# Patient Record
Sex: MALE | Race: WHITE | NOT HISPANIC OR LATINO | Employment: OTHER | URBAN - METROPOLITAN AREA
[De-identification: names, ages, dates, MRNs, and addresses within clinical notes are randomized per-mention and may not be internally consistent; named-entity substitution may affect disease eponyms.]

---

## 2017-09-20 PROBLEM — S52.509A DISTAL RADIUS FRACTURE: Status: ACTIVE | Noted: 2017-09-20

## 2023-10-14 ENCOUNTER — HOSPITAL ENCOUNTER (INPATIENT)
Facility: HOSPITAL | Age: 73
LOS: 6 days | Discharge: NURSING, REHAB OR LONG-TERM CARE | DRG: 064 | End: 2023-10-20
Attending: INTERNAL MEDICINE | Admitting: INTERNAL MEDICINE
Payer: MEDICARE

## 2023-10-14 DIAGNOSIS — R94.31 QT PROLONGATION: ICD-10-CM

## 2023-10-14 DIAGNOSIS — I61.9 INTRACEREBRAL HEMORRHAGE: ICD-10-CM

## 2023-10-14 DIAGNOSIS — I61.9 ICH (INTRACEREBRAL HEMORRHAGE): ICD-10-CM

## 2023-10-14 PROBLEM — F01.50 VASCULAR DEMENTIA: Chronic | Status: ACTIVE | Noted: 2023-10-14

## 2023-10-14 PROBLEM — G93.6 VASOGENIC BRAIN EDEMA: Status: ACTIVE | Noted: 2023-10-14

## 2023-10-14 PROBLEM — R90.89 MIDLINE SHIFT OF BRAIN: Status: ACTIVE | Noted: 2023-10-14

## 2023-10-14 PROBLEM — I61.1 NONTRAUMATIC CORTICAL HEMORRHAGE OF LEFT CEREBRAL HEMISPHERE: Status: ACTIVE | Noted: 2023-10-14

## 2023-10-14 PROBLEM — Z97.8 ENDOTRACHEALLY INTUBATED: Status: ACTIVE | Noted: 2023-10-14

## 2023-10-14 PROBLEM — S52.509A DISTAL RADIUS FRACTURE: Status: RESOLVED | Noted: 2017-09-20 | Resolved: 2023-10-14

## 2023-10-14 LAB
ABO + RH BLD: NORMAL
ALBUMIN SERPL BCP-MCNC: 3.4 G/DL (ref 3.5–5.2)
ALLENS TEST: ABNORMAL
ALP SERPL-CCNC: 68 U/L (ref 55–135)
ALT SERPL W/O P-5'-P-CCNC: 10 U/L (ref 10–44)
ANION GAP SERPL CALC-SCNC: 11 MMOL/L (ref 8–16)
APTT PPP: 22.8 SEC (ref 21–32)
ASCENDING AORTA: 3.8 CM
AST SERPL-CCNC: 28 U/L (ref 10–40)
AV INDEX (PROSTH): 0.7
AV MEAN GRADIENT: 4 MMHG
AV PEAK GRADIENT: 8 MMHG
AV VALVE AREA BY VELOCITY RATIO: 3.42 CM²
AV VALVE AREA: 2.93 CM²
AV VELOCITY RATIO: 0.82
BACTERIA #/AREA URNS AUTO: ABNORMAL /HPF
BASOPHILS # BLD AUTO: 0.03 K/UL (ref 0–0.2)
BASOPHILS NFR BLD: 0.2 % (ref 0–1.9)
BILIRUB SERPL-MCNC: 1 MG/DL (ref 0.1–1)
BILIRUB UR QL STRIP: NEGATIVE
BLD GP AB SCN CELLS X3 SERPL QL: NORMAL
BSA FOR ECHO PROCEDURE: 1.71 M2
BUN SERPL-MCNC: 19 MG/DL (ref 8–23)
CALCIUM SERPL-MCNC: 8.5 MG/DL (ref 8.7–10.5)
CHLORIDE SERPL-SCNC: 108 MMOL/L (ref 95–110)
CHOLEST SERPL-MCNC: 147 MG/DL (ref 120–199)
CHOLEST/HDLC SERPL: 3.9 {RATIO} (ref 2–5)
CLARITY UR REFRACT.AUTO: CLEAR
CO2 SERPL-SCNC: 21 MMOL/L (ref 23–29)
COLOR UR AUTO: YELLOW
CREAT SERPL-MCNC: 0.9 MG/DL (ref 0.5–1.4)
CV ECHO LV RWT: 0.31 CM
DIFFERENTIAL METHOD: ABNORMAL
DOP CALC AO PEAK VEL: 1.37 M/S
DOP CALC AO VTI: 28.13 CM
DOP CALC LVOT AREA: 4.2 CM2
DOP CALC LVOT DIAMETER: 2.31 CM
DOP CALC LVOT PEAK VEL: 1.12 M/S
DOP CALC LVOT STROKE VOLUME: 82.31 CM3
DOP CALCLVOT PEAK VEL VTI: 19.65 CM
E WAVE DECELERATION TIME: 196.39 MSEC
E/A RATIO: 0.74
E/E' RATIO: 8.71 M/S
ECHO LV POSTERIOR WALL: 0.77 CM (ref 0.6–1.1)
EJECTION FRACTION: 65 %
EOSINOPHIL # BLD AUTO: 0 K/UL (ref 0–0.5)
EOSINOPHIL NFR BLD: 0.1 % (ref 0–8)
ERYTHROCYTE [DISTWIDTH] IN BLOOD BY AUTOMATED COUNT: 12.3 % (ref 11.5–14.5)
EST. GFR  (NO RACE VARIABLE): >60 ML/MIN/1.73 M^2
ESTIMATED AVG GLUCOSE: 117 MG/DL (ref 68–131)
FRACTIONAL SHORTENING: 37 % (ref 28–44)
GLUCOSE SERPL-MCNC: 129 MG/DL (ref 70–110)
GLUCOSE UR QL STRIP: NEGATIVE
HBA1C MFR BLD: 5.7 % (ref 4–5.6)
HCO3 UR-SCNC: 26.7 MMOL/L (ref 24–28)
HCT VFR BLD AUTO: 40.7 % (ref 40–54)
HDLC SERPL-MCNC: 38 MG/DL (ref 40–75)
HDLC SERPL: 25.9 % (ref 20–50)
HGB BLD-MCNC: 13.8 G/DL (ref 14–18)
HGB UR QL STRIP: ABNORMAL
HYALINE CASTS UR QL AUTO: 0 /LPF
IMM GRANULOCYTES # BLD AUTO: 0.07 K/UL (ref 0–0.04)
IMM GRANULOCYTES NFR BLD AUTO: 0.5 % (ref 0–0.5)
INR PPP: 1 (ref 0.8–1.2)
INTERVENTRICULAR SEPTUM: 0.77 CM (ref 0.6–1.1)
IVRT: 117.03 MSEC
KETONES UR QL STRIP: ABNORMAL
LA MAJOR: 5.33 CM
LA MINOR: 5.29 CM
LA WIDTH: 4.55 CM
LDLC SERPL CALC-MCNC: 87.8 MG/DL (ref 63–159)
LEFT ATRIUM SIZE: 3.57 CM
LEFT ATRIUM VOLUME INDEX MOD: 38.6 ML/M2
LEFT ATRIUM VOLUME INDEX: 42.9 ML/M2
LEFT ATRIUM VOLUME MOD: 65.93 CM3
LEFT ATRIUM VOLUME: 73.31 CM3
LEFT INTERNAL DIMENSION IN SYSTOLE: 3.2 CM (ref 2.1–4)
LEFT VENTRICLE DIASTOLIC VOLUME INDEX: 70.55 ML/M2
LEFT VENTRICLE DIASTOLIC VOLUME: 120.64 ML
LEFT VENTRICLE MASS INDEX: 77 G/M2
LEFT VENTRICLE SYSTOLIC VOLUME INDEX: 24 ML/M2
LEFT VENTRICLE SYSTOLIC VOLUME: 41.09 ML
LEFT VENTRICULAR INTERNAL DIMENSION IN DIASTOLE: 5.04 CM (ref 3.5–6)
LEFT VENTRICULAR MASS: 131.11 G
LEUKOCYTE ESTERASE UR QL STRIP: NEGATIVE
LV LATERAL E/E' RATIO: 8.71 M/S
LV SEPTAL E/E' RATIO: 8.71 M/S
LYMPHOCYTES # BLD AUTO: 1.2 K/UL (ref 1–4.8)
LYMPHOCYTES NFR BLD: 9 % (ref 18–48)
MAGNESIUM SERPL-MCNC: 1.9 MG/DL (ref 1.6–2.6)
MCH RBC QN AUTO: 31.8 PG (ref 27–31)
MCHC RBC AUTO-ENTMCNC: 33.9 G/DL (ref 32–36)
MCV RBC AUTO: 94 FL (ref 82–98)
MICROSCOPIC COMMENT: ABNORMAL
MONOCYTES # BLD AUTO: 0.7 K/UL (ref 0.3–1)
MONOCYTES NFR BLD: 5.1 % (ref 4–15)
MV PEAK A VEL: 0.82 M/S
MV PEAK E VEL: 0.61 M/S
MV STENOSIS PRESSURE HALF TIME: 56.95 MS
MV VALVE AREA P 1/2 METHOD: 3.86 CM2
NEUTROPHILS # BLD AUTO: 11.3 K/UL (ref 1.8–7.7)
NEUTROPHILS NFR BLD: 85.1 % (ref 38–73)
NITRITE UR QL STRIP: NEGATIVE
NONHDLC SERPL-MCNC: 109 MG/DL
NRBC BLD-RTO: 0 /100 WBC
PCO2 BLDA: 48.5 MMHG (ref 35–45)
PH SMN: 7.35 [PH] (ref 7.35–7.45)
PH UR STRIP: 7 [PH] (ref 5–8)
PHOSPHATE SERPL-MCNC: 4.3 MG/DL (ref 2.7–4.5)
PISA TR MAX VEL: 2.49 M/S
PLATELET # BLD AUTO: 266 K/UL (ref 150–450)
PMV BLD AUTO: 9.5 FL (ref 9.2–12.9)
PO2 BLDA: 132 MMHG (ref 80–100)
POC BE: 1 MMOL/L
POC SATURATED O2: 99 % (ref 95–100)
POC TCO2: 28 MMOL/L (ref 23–27)
POCT GLUCOSE: 112 MG/DL (ref 70–110)
POCT GLUCOSE: 146 MG/DL (ref 70–110)
POTASSIUM SERPL-SCNC: 4.8 MMOL/L (ref 3.5–5.1)
PROT SERPL-MCNC: 6.6 G/DL (ref 6–8.4)
PROT UR QL STRIP: ABNORMAL
PROTHROMBIN TIME: 10.7 SEC (ref 9–12.5)
RA MAJOR: 4.63 CM
RA PRESSURE ESTIMATED: 3 MMHG
RA WIDTH: 3.77 CM
RBC # BLD AUTO: 4.34 M/UL (ref 4.6–6.2)
RBC #/AREA URNS AUTO: 39 /HPF (ref 0–4)
RIGHT VENTRICULAR END-DIASTOLIC DIMENSION: 3.15 CM
RV TB RVSP: 5 MMHG
SAMPLE: ABNORMAL
SINUS: 4.44 CM
SITE: ABNORMAL
SODIUM SERPL-SCNC: 140 MMOL/L (ref 136–145)
SP GR UR STRIP: >1.03 (ref 1–1.03)
SPECIMEN OUTDATE: NORMAL
STJ: 3.37 CM
TDI LATERAL: 0.07 M/S
TDI SEPTAL: 0.07 M/S
TDI: 0.07 M/S
TR MAX PG: 25 MMHG
TRICUSPID ANNULAR PLANE SYSTOLIC EXCURSION: 2.56 CM
TRIGL SERPL-MCNC: 106 MG/DL (ref 30–150)
TSH SERPL DL<=0.005 MIU/L-ACNC: 2.78 UIU/ML (ref 0.4–4)
TV REST PULMONARY ARTERY PRESSURE: 28 MMHG
URN SPEC COLLECT METH UR: ABNORMAL
WBC # BLD AUTO: 13.28 K/UL (ref 3.9–12.7)
WBC #/AREA URNS AUTO: 15 /HPF (ref 0–5)
Z-SCORE OF LEFT VENTRICULAR DIMENSION IN END DIASTOLE: 0.6
Z-SCORE OF LEFT VENTRICULAR DIMENSION IN END SYSTOLE: 0.67

## 2023-10-14 PROCEDURE — 99291 CRITICAL CARE FIRST HOUR: CPT | Mod: ,,, | Performed by: REGISTERED NURSE

## 2023-10-14 PROCEDURE — 83735 ASSAY OF MAGNESIUM: CPT | Performed by: REGISTERED NURSE

## 2023-10-14 PROCEDURE — 36600 WITHDRAWAL OF ARTERIAL BLOOD: CPT

## 2023-10-14 PROCEDURE — 84443 ASSAY THYROID STIM HORMONE: CPT | Performed by: REGISTERED NURSE

## 2023-10-14 PROCEDURE — 86850 RBC ANTIBODY SCREEN: CPT | Performed by: REGISTERED NURSE

## 2023-10-14 PROCEDURE — 80061 LIPID PANEL: CPT | Performed by: REGISTERED NURSE

## 2023-10-14 PROCEDURE — 27100171 HC OXYGEN HIGH FLOW UP TO 24 HOURS

## 2023-10-14 PROCEDURE — 81001 URINALYSIS AUTO W/SCOPE: CPT | Performed by: REGISTERED NURSE

## 2023-10-14 PROCEDURE — 63600175 PHARM REV CODE 636 W HCPCS: Performed by: REGISTERED NURSE

## 2023-10-14 PROCEDURE — 85730 THROMBOPLASTIN TIME PARTIAL: CPT | Performed by: REGISTERED NURSE

## 2023-10-14 PROCEDURE — 85610 PROTHROMBIN TIME: CPT | Performed by: REGISTERED NURSE

## 2023-10-14 PROCEDURE — 84100 ASSAY OF PHOSPHORUS: CPT | Performed by: REGISTERED NURSE

## 2023-10-14 PROCEDURE — 83036 HEMOGLOBIN GLYCOSYLATED A1C: CPT | Performed by: REGISTERED NURSE

## 2023-10-14 PROCEDURE — 99900035 HC TECH TIME PER 15 MIN (STAT)

## 2023-10-14 PROCEDURE — A9585 GADOBUTROL INJECTION: HCPCS | Performed by: INTERNAL MEDICINE

## 2023-10-14 PROCEDURE — 27200966 HC CLOSED SUCTION SYSTEM

## 2023-10-14 PROCEDURE — 25500020 PHARM REV CODE 255: Performed by: INTERNAL MEDICINE

## 2023-10-14 PROCEDURE — 99223 1ST HOSP IP/OBS HIGH 75: CPT | Mod: ,,, | Performed by: PSYCHIATRY & NEUROLOGY

## 2023-10-14 PROCEDURE — 94002 VENT MGMT INPAT INIT DAY: CPT

## 2023-10-14 PROCEDURE — 82803 BLOOD GASES ANY COMBINATION: CPT

## 2023-10-14 PROCEDURE — 94761 N-INVAS EAR/PLS OXIMETRY MLT: CPT

## 2023-10-14 PROCEDURE — 51798 US URINE CAPACITY MEASURE: CPT

## 2023-10-14 PROCEDURE — 93005 ELECTROCARDIOGRAM TRACING: CPT

## 2023-10-14 PROCEDURE — 20000000 HC ICU ROOM

## 2023-10-14 PROCEDURE — 93010 EKG 12-LEAD: ICD-10-PCS | Mod: ,,, | Performed by: INTERNAL MEDICINE

## 2023-10-14 PROCEDURE — 87086 URINE CULTURE/COLONY COUNT: CPT | Performed by: REGISTERED NURSE

## 2023-10-14 PROCEDURE — 99291 PR CRITICAL CARE, E/M 30-74 MINUTES: ICD-10-PCS | Mod: ,,, | Performed by: REGISTERED NURSE

## 2023-10-14 PROCEDURE — 51701 INSERT BLADDER CATHETER: CPT

## 2023-10-14 PROCEDURE — 80053 COMPREHEN METABOLIC PANEL: CPT | Performed by: REGISTERED NURSE

## 2023-10-14 PROCEDURE — 85025 COMPLETE CBC W/AUTO DIFF WBC: CPT | Performed by: REGISTERED NURSE

## 2023-10-14 PROCEDURE — 93010 ELECTROCARDIOGRAM REPORT: CPT | Mod: ,,, | Performed by: INTERNAL MEDICINE

## 2023-10-14 PROCEDURE — 25000003 PHARM REV CODE 250: Performed by: REGISTERED NURSE

## 2023-10-14 PROCEDURE — 99223 PR INITIAL HOSPITAL CARE,LEVL III: ICD-10-PCS | Mod: ,,, | Performed by: PSYCHIATRY & NEUROLOGY

## 2023-10-14 RX ORDER — CHLORHEXIDINE GLUCONATE ORAL RINSE 1.2 MG/ML
15 SOLUTION DENTAL 2 TIMES DAILY
Status: CANCELLED | OUTPATIENT
Start: 2023-10-14

## 2023-10-14 RX ORDER — SODIUM CHLORIDE 0.9 % (FLUSH) 0.9 %
10 SYRINGE (ML) INJECTION
Status: DISCONTINUED | OUTPATIENT
Start: 2023-10-14 | End: 2023-10-16

## 2023-10-14 RX ORDER — NICARDIPINE HYDROCHLORIDE 0.2 MG/ML
INJECTION INTRAVENOUS
Status: DISPENSED
Start: 2023-10-14 | End: 2023-10-14

## 2023-10-14 RX ORDER — LABETALOL HCL 20 MG/4 ML
10 SYRINGE (ML) INTRAVENOUS EVERY 4 HOURS PRN
Status: DISCONTINUED | OUTPATIENT
Start: 2023-10-14 | End: 2023-10-14

## 2023-10-14 RX ORDER — PROPOFOL 10 MG/ML
0-50 INJECTION, EMULSION INTRAVENOUS CONTINUOUS
Status: DISCONTINUED | OUTPATIENT
Start: 2023-10-14 | End: 2023-10-15

## 2023-10-14 RX ORDER — GLUCAGON 1 MG
1 KIT INJECTION
Status: DISCONTINUED | OUTPATIENT
Start: 2023-10-14 | End: 2023-10-16

## 2023-10-14 RX ORDER — FAMOTIDINE 20 MG/1
20 TABLET, FILM COATED ORAL 2 TIMES DAILY
Status: DISCONTINUED | OUTPATIENT
Start: 2023-10-14 | End: 2023-10-15

## 2023-10-14 RX ORDER — ACETAMINOPHEN 325 MG/1
650 TABLET ORAL EVERY 6 HOURS PRN
Status: DISCONTINUED | OUTPATIENT
Start: 2023-10-14 | End: 2023-10-16

## 2023-10-14 RX ORDER — MEMANTINE HYDROCHLORIDE 5 MG/1
5 TABLET ORAL 2 TIMES DAILY
Status: DISCONTINUED | OUTPATIENT
Start: 2023-10-14 | End: 2023-10-20 | Stop reason: HOSPADM

## 2023-10-14 RX ORDER — NICARDIPINE HYDROCHLORIDE 0.2 MG/ML
0-15 INJECTION INTRAVENOUS CONTINUOUS
Status: DISCONTINUED | OUTPATIENT
Start: 2023-10-14 | End: 2023-10-16

## 2023-10-14 RX ORDER — ONDANSETRON 2 MG/ML
4 INJECTION INTRAMUSCULAR; INTRAVENOUS EVERY 8 HOURS PRN
Status: DISCONTINUED | OUTPATIENT
Start: 2023-10-14 | End: 2023-10-20 | Stop reason: HOSPADM

## 2023-10-14 RX ORDER — AMOXICILLIN 250 MG
1 CAPSULE ORAL 2 TIMES DAILY
Status: DISCONTINUED | OUTPATIENT
Start: 2023-10-14 | End: 2023-10-16

## 2023-10-14 RX ORDER — HYDRALAZINE HYDROCHLORIDE 20 MG/ML
10 INJECTION INTRAMUSCULAR; INTRAVENOUS EVERY 4 HOURS PRN
Status: DISCONTINUED | OUTPATIENT
Start: 2023-10-14 | End: 2023-10-16

## 2023-10-14 RX ORDER — HYDRALAZINE HYDROCHLORIDE 20 MG/ML
10 INJECTION INTRAMUSCULAR; INTRAVENOUS EVERY 4 HOURS PRN
Status: DISCONTINUED | OUTPATIENT
Start: 2023-10-14 | End: 2023-10-14

## 2023-10-14 RX ORDER — LABETALOL HCL 20 MG/4 ML
10 SYRINGE (ML) INTRAVENOUS EVERY 4 HOURS PRN
Status: DISCONTINUED | OUTPATIENT
Start: 2023-10-14 | End: 2023-10-16

## 2023-10-14 RX ORDER — INSULIN ASPART 100 [IU]/ML
0-5 INJECTION, SOLUTION INTRAVENOUS; SUBCUTANEOUS EVERY 6 HOURS PRN
Status: DISCONTINUED | OUTPATIENT
Start: 2023-10-14 | End: 2023-10-16

## 2023-10-14 RX ORDER — GADOBUTROL 604.72 MG/ML
7 INJECTION INTRAVENOUS
Status: COMPLETED | OUTPATIENT
Start: 2023-10-14 | End: 2023-10-14

## 2023-10-14 RX ADMIN — FAMOTIDINE 20 MG: 20 TABLET ORAL at 08:10

## 2023-10-14 RX ADMIN — LABETALOL HYDROCHLORIDE 10 MG: 5 INJECTION, SOLUTION INTRAVENOUS at 11:10

## 2023-10-14 RX ADMIN — SENNOSIDES AND DOCUSATE SODIUM 1 TABLET: 50; 8.6 TABLET ORAL at 08:10

## 2023-10-14 RX ADMIN — MEMANTINE HYDROCHLORIDE 5 MG: 5 TABLET ORAL at 09:10

## 2023-10-14 RX ADMIN — FAMOTIDINE 20 MG: 20 TABLET ORAL at 09:10

## 2023-10-14 RX ADMIN — THERA TABS 1 TABLET: TAB at 09:10

## 2023-10-14 RX ADMIN — PROPOFOL 50 MCG/KG/MIN: 10 INJECTION, EMULSION INTRAVENOUS at 09:10

## 2023-10-14 RX ADMIN — ACETAMINOPHEN 650 MG: 325 TABLET ORAL at 09:10

## 2023-10-14 RX ADMIN — GADOBUTROL 7 ML: 604.72 INJECTION INTRAVENOUS at 08:10

## 2023-10-14 RX ADMIN — SENNOSIDES AND DOCUSATE SODIUM 1 TABLET: 50; 8.6 TABLET ORAL at 09:10

## 2023-10-14 RX ADMIN — ONDANSETRON 4 MG: 2 INJECTION INTRAMUSCULAR; INTRAVENOUS at 11:10

## 2023-10-14 RX ADMIN — IOHEXOL 75 ML: 350 INJECTION, SOLUTION INTRAVENOUS at 03:10

## 2023-10-14 RX ADMIN — HYDRALAZINE HYDROCHLORIDE 10 MG: 20 INJECTION, SOLUTION INTRAMUSCULAR; INTRAVENOUS at 10:10

## 2023-10-14 RX ADMIN — MEMANTINE HYDROCHLORIDE 5 MG: 5 TABLET ORAL at 08:10

## 2023-10-14 RX ADMIN — ACETAMINOPHEN 650 MG: 325 TABLET ORAL at 04:10

## 2023-10-14 RX ADMIN — PROPOFOL 50 MCG/KG/MIN: 10 INJECTION, EMULSION INTRAVENOUS at 05:10

## 2023-10-14 NOTE — ASSESSMENT & PLAN NOTE
74 y/o M with PMH of vascular dementia presents with L ICH with IVH from OSH. Denies home anticoagulation use and recent falls/trauma. No seizure activity noted. Repeat CTA with no interval changes, no active bleeding and no evident vessel abnormalities.     E2 V1T M5  PERRL  ICH 2    -Admit to NCC  -VS/neuro checks q1h  -NSGY consulted  -Vascular neurology consulted  -SBP goal < 140  -PRN labetalol/hydralazine/cardene  -Na+ > 135  -Seizure precautions  -Accuchecks q6h w/ low dose SSI for glycemic control  -MRI brain w/ and w/o contrast    Antithrombotics for secondary stroke prevention: Antiplatelets: None: Intracerebral Hemorrhage    Statins for secondary stroke prevention and hyperlipidemia, if present:   Statins: None: Reason: ICH    Aggressive risk factor modification: None     Rehab efforts: The patient has been evaluated by a stroke team provider and the therapy needs have been fully considered based off the presenting complaints and exam findings. The following therapy evaluations are needed: PT evaluate and treat, OT evaluate and treat, SLP evaluate and treat, PM&R evaluate for appropriate placement    Diagnostics ordered/pending: CTA Head to assess vasculature , HgbA1C to assess blood glucose levels, Lipid Profile to assess cholesterol levels, MRI head without contrast to assess brain parenchyma, TTE to assess cardiac function/status , TSH to assess thyroid function    VTE prophylaxis: None: Reason for No Pharmacological VTE Prophylaxis: History of systemic or intracranial bleeding    BP parameters: ICH: SBP <140

## 2023-10-14 NOTE — PROGRESS NOTES
[facility]  Neurosurgery  Progress Note    Subjective:     History of Present Illness: 73M h/o vascular dementia presents as transfer from [facility] with a chief complaint of confusion and difficulty seeing, and HA. The patient's family member stated that he was in his usual state of health this morning he left the house around 2:00 p.m. on 10-13-23 to cut some grass. When he returned around 5:00pm the patient's wife noted that he was having difficulty turning off his truck and he was having difficulty seeing. He also reported generalized weakness and a headache on the left side it has been off and on for about a week.  Denies trauma, fever/chills, hearing changes, dysphagia, dysarthria, nausea, new-onset weakness or sensory change, bowel/bladder changes. Patient was intubated prior to transfer as decrease in GCS and patient vomited. He was placed on Cardene drip and sedation.    CTA on presentation demonstrates a grossly stable 4.5x4.3x3.0cm L parieto-occipital ICH with IVH. There are various densities of blood within the hematoma cavity as well as perihematomal edema. There is no sign of active extravasation. There are no obvious vessel abnormalities. There is approximately 3cm MLS.       Post-Op Info:  * No surgery found *         Interval History: 10/14: NAEON, AFVSS, Exam stable (very agitated off of sedation). MRI w/ significant amyloid as likely underlying disease process.    Medications:  Continuous Infusions:   niCARdipine      propofoL 50 mcg/kg/min (10/14/23 0910)     Scheduled Meds:   famotidine  20 mg Per OG tube BID    memantine  5 mg Oral BID    multivitamin  1 tablet Per OG tube Daily    senna-docusate 8.6-50 mg  1 tablet Per OG tube BID     PRN Meds:acetaminophen, dextrose 10%, dextrose 10%, glucagon (human recombinant), hydrALAZINE, insulin aspart U-100, labetalol, ondansetron, sodium chloride 0.9%     Review of Systems  Objective:     Weight: 64 kg  (141 lb)  Body mass index is 23.46 kg/m².  Vital Signs (Most Recent):  Temp: 98 °F (36.7 °C) (10/14/23 1101)  Pulse: (!) 59 (10/14/23 1201)  Resp: 18 (10/14/23 1201)  BP: (!) 142/101 (10/14/23 1201)  SpO2: 99 % (10/14/23 1201) Vital Signs (24h Range):  Temp:  [97 °F (36.1 °C)-98.2 °F (36.8 °C)] 98 °F (36.7 °C)  Pulse:  [58-95] 59  Resp:  [12-39] 18  SpO2:  [94 %-100 %] 99 %  BP: (111-244)/() 142/101          Vent Mode: A/C  Oxygen Concentration (%):  [] 40  Resp Rate Total:  [12 br/min-28 br/min] 19 br/min  Vt Set:  [450 mL-500 mL] 450 mL  PEEP/CPAP:  [5 cmH20] 5 cmH20  Mean Airway Pressure:  [6.8 cmH20-8.9 cmH20] 6.9 cmH20             NG/OG Tube 10/13/23 Center mouth (Active)   Placement Check placement verified by x-ray;placement verified by distal tube length measurement 10/14/23 0505   Tolerance no signs/symptoms of discomfort 10/14/23 1101   Securement secured to commercial device 10/14/23 1101   Clamp Status/Tolerance clamped;no restlessness;no nausea;no emesis;no abdominal distention;no abdominal discomfort;no residual 10/14/23 1101   Suction Setting/Drainage Method suction at the bedside 10/14/23 1101   Insertion Site Appearance no redness, warmth, tenderness, skin breakdown, drainage 10/14/23 1101   Drainage None 10/14/23 0505   Current Rate (mL/hr) 0 mL/hr 10/14/23 0148          Physical Exam   Neurosurgery Physical Exam  E2VtM5, sedated on propofol (agitated when off sedation);  +C/G/C, PERRL, No gaze preference;  Artie and purposefully when off sedation      Significant Labs:  Recent Labs   Lab 10/13/23  2210 10/14/23  0404   * 129*    140   K 4.1 4.8    108   CO2 24 21*   BUN 21* 19   CREATININE 0.91 0.9   CALCIUM 10.2 8.5*   MG  --  1.9     Recent Labs   Lab 10/13/23  2210 10/14/23  0404   WBC 17.10* 13.28*   HGB 15.4 13.8*   HCT 46.0 40.7    266     Recent Labs   Lab 10/13/23  2229 10/14/23  0404   LABPT 12.7  --    INR 0.9 1.0   APTT 24.4* 22.8     Microbiology  "Results (last 7 days)       ** No results found for the last 168 hours. **          ABGs:   Recent Labs   Lab 10/14/23  0601   PH 7.349*   PCO2 48.5*   PO2 132*   HCO3 26.7   POCSATURATED 99   BE 1     Cardiac markers:   Recent Labs   Lab 10/13/23  2210   TROPONINI <0.012     CMP:   Recent Labs   Lab 10/13/23  2210 10/14/23  0404   * 129*   CALCIUM 10.2 8.5*   ALBUMIN 4.7 3.4*   PROT 8.2 6.6    140   K 4.1 4.8   CO2 24 21*    108   BUN 21* 19   CREATININE 0.91 0.9   ALKPHOS 89 68   ALT 22 10   AST 35 28   BILITOT 1.4* 1.0     CRP: No results for input(s): "CRP" in the last 48 hours.  ESR: No results for input(s): "POCESR", "ERYTHROCYTES" in the last 48 hours.  LFTs:   Recent Labs   Lab 10/13/23  2210 10/14/23  0404   ALT 22 10   AST 35 28   ALKPHOS 89 68   BILITOT 1.4* 1.0   PROT 8.2 6.6   ALBUMIN 4.7 3.4*     Procalcitonin: No results for input(s): "PROCAL" in the last 48 hours.    Significant Diagnostics:  I have reviewed all pertinent imaging results/findings within the past 24 hours.    Assessment/Plan:     * Nontraumatic cortical hemorrhage of left cerebral hemisphere  73M h/o vascular dementia presents as transfer from [facility] with a chief complaint of confusion and difficulty seeing, and HA. CTA on presentation demonstrates a grossly stable 4.5x4.3x3.0cm L parieto-occipital ICH with IVH (ICHS 2). There are various densities of blood within the hematoma cavity as well as perihematomal edema. There is no sign of active extravasation. There are no obvious vessel abnormalities. There is approximately 3cm MLS.     --Patient admitted to ICU on telemetry      -q1h neurochecks in ICU  --All labs and diagnostics reviewed      -CTH/CTA 10/14: grossly stable 4.5x4.3x3.0cm L parieto-occipital ICH w/ IVH; No underlying vascular lesion      -MRI w/wo 10/14: Significant amyloid as likely etiology of bleed, stable ICH  --Hold therapeutic anti-plt/coag medications  --SBP <140  --Na " >135  --AED per NCC  --HOB >30  --WTE per NCC  --ADAT per NCC  --Stable for SQH per primary team   --No NSGY intervention at this time  --Continued work-up and management per NCC/Stroke teams  --Continue to monitor clinically, notify NSGY immediately with any changes in neuro status    Dispo: Per NCC/Stroke teams          Sylvester Rueda MD  Neurosurgery  [facility] - [facility]

## 2023-10-14 NOTE — PT/OT/SLP PROGRESS
Speech Language Pathology      Kobi NETTE Pittman  MRN: [id number]    Patient not seen today secondary to patient intubated . Patient will require new orders. Please re-consult as appropriate.

## 2023-10-14 NOTE — ASSESSMENT & PLAN NOTE
-Neuro checks q1h  -NSGY consulted  -Na+ > 135  -Accuchecks q6h w/ low dose SSI for glycemic control  -MRI brain w/ and w/o contrast

## 2023-10-14 NOTE — PLAN OF CARE
MRI suggestive of CAA.  EKG with bifasicular block. Pt's wife denies any known cardiac history. Spoke with cardiology, low level of concern considering EKG is unchanged from prior.     Dakota Del Castillo PA-C  Neurocritical Care

## 2023-10-14 NOTE — ASSESSMENT & PLAN NOTE
74 y/o male with L ICH with IVH non traumatic    Antithrombotics: None ICH    Statins: None ICH    Aggressive risk factor modification: None     Rehab efforts: The patient has been evaluated by a stroke team provider and the therapy needs have been fully considered based off the presenting complaints and exam findings. The following therapy evaluations are needed: PT evaluate and treat, OT evaluate and treat, SLP evaluate and treat, PM&R evaluate for appropriate placement    Diagnostics ordered/pending: HgbA1C to assess blood glucose levels, Lipid Profile to assess cholesterol levels, MRI head without contrast to assess brain parenchyma, TTE to assess cardiac function/status     VTE prophylaxis: Mechanical prophylaxis: Place SCDs  None: Reason for No Pharmacological VTE Prophylaxis: History of systemic or intracranial bleeding    BP parameters: ICH: SBP <140

## 2023-10-14 NOTE — HPI
73-year-old male coming to the ER at [facility] with a chief complaint of confusion and difficulty seeing.  The patient's family member stated that he was in his usual state of health this morning he left the house around 3:00 a.m. on 10-13-23 to go cut some grass when he returned around 5 the patient's wife noted that he was having difficulty turning off his truck he was having difficulty seeing you could not see any pictures on the wall he had no numbness complains of some generalized weakness and a headache on the left side it has been off and on for about a week.  He denies any trauma    Patient was intubated prior to transfer as decrease in GCS and patient vomited. He was placed on Cardene drip and sedation.    Patient was direct transfer to NICU.    Received call about consult at 0306.

## 2023-10-14 NOTE — ASSESSMENT & PLAN NOTE
As seen on cerebral imaging, close monitoring of neuro status and serial CT scans as needed and Neurosurgery consult to monitor as well

## 2023-10-14 NOTE — CONSULTS
Inpatient consult to Physical Medicine Rehab  Consult performed by: Priyanka Saba NP  Consult ordered by: Kathy Rondon NP  Reason for consult: Assess rehab needs      Reviewed patient history and current admission.  Rehab team following.  Full consult to follow.    JAK Silvestre, FNP-C  Physical Medicine & Rehabilitation   10/14/2023

## 2023-10-14 NOTE — SUBJECTIVE & OBJECTIVE
Past Medical History:   Diagnosis Date    Acid reflux     Cataract     Kidney stone      Past Surgical History:   Procedure Laterality Date    COLONOSCOPY       Family History   Problem Relation Age of Onset    Cancer Mother      Social History     Tobacco Use    Smoking status: Never    Smokeless tobacco: Never   Substance Use Topics    Alcohol use: No    Drug use: No     Review of patient's allergies indicates:  No Known Allergies    Medications: I have reviewed the current medication administration record.    Medications Prior to Admission   Medication Sig Dispense Refill Last Dose    hydrocodone-acetaminophen 7.5-325mg (NORCO) 7.5-325 mg per tablet Take 1-2 tablets by mouth every 4 (four) hours as needed for Pain.        memantine (NAMENDA) 10 MG Tab Take 5 mg by mouth 2 (two) times daily.       MULTIVITAMIN/IRON/FOLIC ACID (CENTRUM COMPLETE ORAL) Take 1 tablet by mouth once daily.          Review of Systems   Unable to perform ROS: Intubated     Objective:     Vital Signs (Most Recent):  Temp: 97 °F (36.1 °C) (10/14/23 0305)  Pulse: (!) 58 (10/14/23 0409)  Resp: 14 (10/14/23 0405)  BP: (!) 150/74 (10/14/23 0405)  SpO2: 99 % (10/14/23 0405)    Vital Signs Range (Last 24H):  Temp:  [97 °F (36.1 °C)-98.1 °F (36.7 °C)]   Pulse:  [58-95]   Resp:  [13-32]   BP: (111-244)/()   SpO2:  [94 %-100 %]        Physical Exam  Vitals and nursing note reviewed.   Constitutional:       Comments: obtunded   HENT:      Head: Normocephalic and atraumatic.   Eyes:      Comments: Visual loss on left    Cardiovascular:      Rate and Rhythm: Normal rate and regular rhythm.   Pulmonary:      Comments: Intubated on vent  Abdominal:      General: Abdomen is flat. Bowel sounds are normal.      Palpations: Abdomen is soft.   Musculoskeletal:         General: Normal range of motion.      Cervical back: Normal range of motion.   Skin:     General: Skin is warm and dry.   Neurological:      Motor: Weakness present.      Comments:  "Intubated on vent              Neurological Exam:   LOC: obtunded  Attention Span: obtubded  Language: Intubated  Articulation: Untestable due to intubation  Orientation: Untestable due to intubation  Visual Fields: Hemianopsia left  EOM (CN III, IV, VI): Full/intact  Pupils (CN II, III): PERRL  Facial Sensation (CN V): Normal  Facial Movement (CN VII): Unable to test   Gag Reflex: present  Reflexes: flexor plantar responses bilaterally  Motor: moves to noxious stim  Cerebellum: No evidence of appendicular or axial ataxia  Sensation: Intact to light touch, temperature and vibration  Tone: Flaccid  LUE , LLE, RUE, and RLE       Laboratory:  CMP:   Recent Labs   Lab 10/13/23  2210   CALCIUM 10.2   ALBUMIN 4.7   PROT 8.2      K 4.1   CO2 24      BUN 21*   CREATININE 0.91   ALKPHOS 89   ALT 22   AST 35   BILITOT 1.4*     CBC:   Recent Labs   Lab 10/13/23  2210   WBC 17.10*   RBC 4.87   HGB 15.4   HCT 46.0      MCV 95   MCH 31.7*   MCHC 33.5     Lipid Panel: No results for input(s): "CHOL", "LDLCALC", "HDL", "TRIG" in the last 168 hours.  Coagulation:   Recent Labs   Lab 10/13/23  2229   INR 0.9   APTT 24.4*     Hgb A1C: No results for input(s): "HGBA1C" in the last 168 hours.  TSH:   Recent Labs   Lab 10/13/23  2210   TSH 2.16       Diagnostic Results:      Brain imaging:  CT head w/o contrast 10-13-23 results:      Vessel Imaging:      Cardiac Evaluation:   EKG 10-13-23 results:  Normal sinus rhythm Right bundle branch block Left anterior fascicular blockBifascicular block  Voltage criteria for left ventricular hypertrophy T wave abnormality, consider inferolateral ischemia Abnormal ECG When compared with ECG of 13-OCT-2023 22:26, Criteria for Septal infarct are no longer Present T wave inversion less evident in Inferior leads      "

## 2023-10-14 NOTE — HPI
72 y/o M with PMH of vascular dementia who presented to ED of [facility] with c/o confusion and difficulty seeing. Wife reports pt was in his usual state of health when he left the house at 3am to go cut grass. He returned home around 5pm, when his wife noticed he was having difficulty turning off his truck. He also reported visual changes and was unable to see any pictures on the wall. In the ED, he c/o generalized weakness and an intermittent L-sided headache x 1 week. Denied any falls or trauma to the area. He initially presented with GCS 15 and no focal deficits. CTH showed large posterior parieto-occipital intracerebral hemorrhage with 5mm shift. He was given Keppra and started on Cardene for BP control. GCS declined from 15 to 13 and pt began projectile vomiting. He was subsequently intubated. Transferred to [facility] for higher level of care and will be admitted to [facility] for further eval and management.

## 2023-10-14 NOTE — CONSULTS
[facility] Critical [facility]  Neurosurgery  Consult Note    Inpatient consult to Neurosurgery  Consult performed by: Anup Arriaga MD  Consult ordered by: Kathy Rondon NP        Subjective:     Chief Complaint/Reason for Admission: ICH    History of Present Illness: 73M h/o vascular dementia presents as transfer from [facility] with a chief complaint of confusion and difficulty seeing, and HA. The patient's family member stated that he was in his usual state of health this morning he left the house around 2:00 p.m. on 10-13-23 to cut some grass. When he returned around 5:00pm the patient's wife noted that he was having difficulty turning off his truck and he was having difficulty seeing. He also reported generalized weakness and a headache on the left side it has been off and on for about a week.  Denies trauma, fever/chills, hearing changes, dysphagia, dysarthria, nausea, new-onset weakness or sensory change, bowel/bladder changes. Patient was intubated prior to transfer as decrease in GCS and patient vomited. He was placed on Cardene drip and sedation.    CTA on presentation demonstrates a grossly stable 4.5x4.3x3.0cm L parieto-occipital ICH with IVH. There are various densities of blood within the hematoma cavity as well as perihematomal edema. There is no sign of active extravasation. There are no obvious vessel abnormalities. There is approximately 3cm MLS.       Medications Prior to Admission   Medication Sig Dispense Refill Last Dose    hydrocodone-acetaminophen 7.5-325mg (NORCO) 7.5-325 mg per tablet Take 1-2 tablets by mouth every 4 (four) hours as needed for Pain.        memantine (NAMENDA) 10 MG Tab Take 5 mg by mouth 2 (two) times daily.       MULTIVITAMIN/IRON/FOLIC ACID (CENTRUM COMPLETE ORAL) Take 1 tablet by mouth once daily.          Review of patient's allergies indicates:  No Known Allergies    Past Medical History:   Diagnosis Date    Acid reflux      Cataract     Kidney stone      Past Surgical History:   Procedure Laterality Date    COLONOSCOPY       Family History       Problem Relation (Age of Onset)    Cancer Mother          Tobacco Use    Smoking status: Never    Smokeless tobacco: Never   Substance and Sexual Activity    Alcohol use: No    Drug use: No    Sexual activity: Not on file     Review of Systems  Objective:     Weight: 64 kg (141 lb)  Body mass index is 23.46 kg/m².  Vital Signs (Most Recent):  Temp: 97 °F (36.1 °C) (10/14/23 0305)  Pulse: (!) 58 (10/14/23 0409)  Resp: 14 (10/14/23 0405)  BP: (!) 150/74 (10/14/23 0405)  SpO2: 99 % (10/14/23 0405) Vital Signs (24h Range):  Temp:  [97 °F (36.1 °C)-98.1 °F (36.7 °C)] 97 °F (36.1 °C)  Pulse:  [58-95] 58  Resp:  [13-32] 14  SpO2:  [94 %-100 %] 99 %  BP: (111-244)/() 150/74                Vent Mode: A/C  Oxygen Concentration (%):  [] 40  Resp Rate Total:  [12 br/min-28 br/min] 12 br/min  Vt Set:  [450 mL-500 mL] 450 mL  PEEP/CPAP:  [5 cmH20] 5 cmH20  Mean Airway Pressure:  [8.3 cmH20-8.9 cmH20] 8.9 cmH20             NG/OG Tube 10/13/23 Center mouth (Active)   Placement Check placement verified by x-ray;placement verified by distal tube length measurement 10/14/23 0305   Tolerance no signs/symptoms of discomfort 10/14/23 0305   Securement secured to commercial device 10/14/23 0305   Clamp Status/Tolerance clamped 10/14/23 0305   Suction Setting/Drainage Method suction at the bedside 10/14/23 0305   Insertion Site Appearance no redness, warmth, tenderness, skin breakdown, drainage 10/14/23 0305   Drainage None 10/14/23 0305   Current Rate (mL/hr) 0 mL/hr 10/14/23 0148          Physical Exam         Neurosurgery Physical Exam    NEURO:    E3VtM5  Intubated, sedated on propofol  RP/LP 2mm and reactive  +c/c/g  Spontanous and purposeful x 4 ag  SILT      Significant Labs:  Recent Labs   Lab 10/13/23  2210   *      K 4.1      CO2 24   BUN 21*   CREATININE 0.91   CALCIUM  10.2     Recent Labs   Lab 10/13/23  2210 10/14/23  0404   WBC 17.10* 13.28*   HGB 15.4 13.8*   HCT 46.0 40.7    266     Recent Labs   Lab 10/13/23  2229 10/14/23  0404   LABPT 12.7  --    INR 0.9 1.0   APTT 24.4* 22.8     Microbiology Results (last 7 days)       ** No results found for the last 168 hours. **          All pertinent labs from the last 24 hours have been reviewed.    Significant Diagnostics:  I have reviewed all pertinent imaging results/findings within the past 24 hours.  No results found in the last 24 hours.    Assessment/Plan:     * Nontraumatic cortical hemorrhage of left cerebral hemisphere  73M h/o vascular dementia presents as transfer from [facility] with a chief complaint of confusion and difficulty seeing, and HA. CTA on presentation demonstrates a grossly stable 4.5x4.3x3.0cm L parieto-occipital ICH with IVH. There are various densities of blood within the hematoma cavity as well as perihematomal edema. There is no sign of active extravasation. There are no obvious vessel abnormalities. There is approximately 3cm MLS.     ICH Score 2    --Patient admitted to ICU on telemetry      -q1h neurochecks in ICU, q2h neurochecks in stepdown, q4h neurochecks on floor  --All labs and diagnostics reviewed  --MRI Brain w/wo to r/o underlying mass  --Hold therapeutic anti-plt/coag medications  --SBP <140  --Na >135  --AED per NCC  --HOB >30  --Follow-up full pre-op labs (CBC/CMP/PT-INR/PTT/T&S)  --NPO at this time for possible operative intervention  --Continue to monitor clinically, notify NSGY immediately with any changes in neuro status    Dispo: ICU    Plan d/w Dr. Gruber.          Thank you for your consult. I will follow-up with patient. Please contact us if you have any additional questions.    Anup Arriaga MD  Neurosurgery  [facility]

## 2023-10-14 NOTE — RESPIRATORY THERAPY
Patient received from EMS to [id number]. Patient intubated with #7.5 ETT/ 23 @ the teeth. Placed on documented vent settings. Ambu bag @ bedside. Will continue to monitor.

## 2023-10-14 NOTE — NURSING
Pt extubated at 11:40 am. Prop stopped prior extubation. PT currently confused with throat discomfort and soreness , following commands. 2L NC tolerating well.  Spouse at the bedside. WCTM

## 2023-10-14 NOTE — NURSING
Patient arrived to [facility] from Ambulance   Report received from: [facility] ED RN    Type of stroke/diagnosis:  ICH    Current symptoms: AMS, does not follow command GCS 8 E2V1M5    Skin Assessment done: Yes  Wounds noted: none  *If wounds noted, was Wound Care consulted? na  *If wounds noted, LDA placed? na  Skin Assessment Verified by:   Leland Mariee Completed? No pt. intubated    Patient Belongings on Admit: (be specific) pair of white socks, underwear    NCC notified: Kathy BUTLER

## 2023-10-14 NOTE — NURSING
Pt arrived back to room following CT        Pt was escorted by RN and RT on cardiac monitoring, O2, ambu bag, and portable vent.        Patient placed back on bedside monitor with alarms audible, bed in low position with bed alarm on, call light within reach. JENN.

## 2023-10-14 NOTE — SUBJECTIVE & OBJECTIVE
Medications Prior to Admission   Medication Sig Dispense Refill Last Dose    hydrocodone-acetaminophen 7.5-325mg (NORCO) 7.5-325 mg per tablet Take 1-2 tablets by mouth every 4 (four) hours as needed for Pain.        memantine (NAMENDA) 10 MG Tab Take 5 mg by mouth 2 (two) times daily.       MULTIVITAMIN/IRON/FOLIC ACID (CENTRUM COMPLETE ORAL) Take 1 tablet by mouth once daily.          Review of patient's allergies indicates:  No Known Allergies    Past Medical History:   Diagnosis Date    Acid reflux     Cataract     Kidney stone      Past Surgical History:   Procedure Laterality Date    COLONOSCOPY       Family History       Problem Relation (Age of Onset)    Cancer Mother          Tobacco Use    Smoking status: Never    Smokeless tobacco: Never   Substance and Sexual Activity    Alcohol use: No    Drug use: No    Sexual activity: Not on file     Review of Systems  Objective:     Weight: 64 kg (141 lb)  Body mass index is 23.46 kg/m².  Vital Signs (Most Recent):  Temp: 97 °F (36.1 °C) (10/14/23 0305)  Pulse: (!) 58 (10/14/23 0409)  Resp: 14 (10/14/23 0405)  BP: (!) 150/74 (10/14/23 0405)  SpO2: 99 % (10/14/23 0405) Vital Signs (24h Range):  Temp:  [97 °F (36.1 °C)-98.1 °F (36.7 °C)] 97 °F (36.1 °C)  Pulse:  [58-95] 58  Resp:  [13-32] 14  SpO2:  [94 %-100 %] 99 %  BP: (111-244)/() 150/74                Vent Mode: A/C  Oxygen Concentration (%):  [] 40  Resp Rate Total:  [12 br/min-28 br/min] 12 br/min  Vt Set:  [450 mL-500 mL] 450 mL  PEEP/CPAP:  [5 cmH20] 5 cmH20  Mean Airway Pressure:  [8.3 cmH20-8.9 cmH20] 8.9 cmH20             NG/OG Tube 10/13/23 Center mouth (Active)   Placement Check placement verified by x-ray;placement verified by distal tube length measurement 10/14/23 0305   Tolerance no signs/symptoms of discomfort 10/14/23 0305   Securement secured to commercial device 10/14/23 0305   Clamp Status/Tolerance clamped 10/14/23 0305   Suction Setting/Drainage Method suction at the bedside  10/14/23 0305   Insertion Site Appearance no redness, warmth, tenderness, skin breakdown, drainage 10/14/23 0305   Drainage None 10/14/23 0305   Current Rate (mL/hr) 0 mL/hr 10/14/23 0148          Physical Exam         Neurosurgery Physical Exam    NEURO:    E3VtM5  Intubated, sedated on propofol  RP/LP 2mm and reactive  +c/c/g  Spontanous and purposeful x 4 ag  SILT      Significant Labs:  Recent Labs   Lab 10/13/23  2210   *      K 4.1      CO2 24   BUN 21*   CREATININE 0.91   CALCIUM 10.2     Recent Labs   Lab 10/13/23  2210 10/14/23  0404   WBC 17.10* 13.28*   HGB 15.4 13.8*   HCT 46.0 40.7    266     Recent Labs   Lab 10/13/23  2229 10/14/23  0404   LABPT 12.7  --    INR 0.9 1.0   APTT 24.4* 22.8     Microbiology Results (last 7 days)       ** No results found for the last 168 hours. **          All pertinent labs from the last 24 hours have been reviewed.    Significant Diagnostics:  I have reviewed all pertinent imaging results/findings within the past 24 hours.  No results found in the last 24 hours.

## 2023-10-14 NOTE — ASSESSMENT & PLAN NOTE
73M h/o vascular dementia presents as transfer from [facility] with a chief complaint of confusion and difficulty seeing, and HA. CTA on presentation demonstrates a grossly stable 4.5x4.3x3.0cm L parieto-occipital ICH with IVH. There are various densities of blood within the hematoma cavity as well as perihematomal edema. There is no sign of active extravasation. There are no obvious vessel abnormalities. There is approximately 3cm MLS.     ICH Score 2    --Patient admitted to ICU on telemetry      -q1h neurochecks in ICU, q2h neurochecks in stepdown, q4h neurochecks on floor  --All labs and diagnostics reviewed  --MRI Brain w/wo to r/o underlying mass  --Hold therapeutic anti-plt/coag medications  --SBP <140  --Na >135  --AED per NCC  --HOB >30  --Follow-up full pre-op labs (CBC/CMP/PT-INR/PTT/T&S)  --NPO at this time for possible operative intervention  --Continue to monitor clinically, notify NSGY immediately with any changes in neuro status    Dispo: ICU    Plan d/w Dr. Gruber.

## 2023-10-14 NOTE — PLAN OF CARE
[facility] Care Plan    POC reviewed with Kobi NETTE Chloéin and family at 0300. Pt verbalized understanding. Questions and concerns addressed. No acute events overnight. Pt progressing toward goals. Will continue to monitor. See below and flowsheets for full assessment and VS info.     -CT complete  -prop @ 50  -MRI pending      Is this a stroke patient? yes- Stroke booklet reviewed with patient and family, risk factors identified for patient and stroke booklet remains at bedside for ongoing education.     Neuro:  [place] Coma Scale  Best Eye Response: 2-->(E2) to pain  Best Motor Response: 5-->(M5) localizes pain  Best Verbal Response: 1-->(V1) none  [place] Coma Scale Score: 8  Assessment Qualifiers: patient intubated  Pupil PERRLA: yes     24hr Temp:  [97 °F (36.1 °C)-98.1 °F (36.7 °C)]     CV:   Rhythm: normal sinus rhythm  BP goals:   SBP < 140  MAP > 65    Resp:      Vent Mode: A/C  Set Rate: 12 BPM  Oxygen Concentration (%): 40  Vt Set: 450 mL  PEEP/CPAP: 5 cmH20    Plan: wean to extubate    GI/:     Diet/Nutrition Received: NPO  Last Bowel Movement: 10/13/23  Voiding Characteristics: external catheter    Intake/Output Summary (Last 24 hours) at 10/14/2023 0611  Last data filed at 10/14/2023 0605  Gross per 24 hour   Intake 1.62 ml   Output 450 ml   Net -448.38 ml     Unmeasured Output  Stool Occurrence: 0    Labs/Accuchecks:  Recent Labs   Lab 10/14/23  0404   WBC 13.28*   RBC 4.34*   HGB 13.8*   HCT 40.7         Recent Labs   Lab 10/14/23  0404      K 4.8   CO2 21*      BUN 19   CREATININE 0.9   ALKPHOS 68   ALT 10   AST 28   BILITOT 1.0      Recent Labs   Lab 10/14/23  0404   INR 1.0   APTT 22.8      Recent Labs   Lab 10/13/23  2210   CPK 92   TROPONINI <0.012       Electrolytes: N/A - electrolytes WDL  Accuchecks: Q6H    Gtts:   niCARdipine      propofoL 50 mcg/kg/min (10/14/23 0605)       LDA/Wounds:  Lines/Drains/Airways       Drain  Duration                  NG/OG Tube 10/13/23 Center mouth 1  day              Airway  Duration                  Airway - Non-Surgical 10/13/23 2246 Endotracheal Tube <1 day              Peripheral Intravenous Line  Duration                  Peripheral IV - Single Lumen 10/13/23 2210 20 G Left Antecubital <1 day         Peripheral IV - Single Lumen 10/13/23 2258 18 G Anterior;Distal;Right Upper Arm <1 day                  Wounds: No  Wound care consulted: No

## 2023-10-14 NOTE — HPI
73M h/o vascular dementia presents as transfer from [facility] with a chief complaint of confusion and difficulty seeing, and HA. The patient's family member stated that he was in his usual state of health this morning he left the house around 2:00 p.m. on 10-13-23 to cut some grass. When he returned around 5:00pm the patient's wife noted that he was having difficulty turning off his truck and he was having difficulty seeing. He also reported generalized weakness and a headache on the left side it has been off and on for about a week.  Denies trauma, fever/chills, hearing changes, dysphagia, dysarthria, nausea, new-onset weakness or sensory change, bowel/bladder changes. Patient was intubated prior to transfer as decrease in GCS and patient vomited. He was placed on Cardene drip and sedation.    CTA on presentation demonstrates a grossly stable 4.5x4.3x3.0cm L parieto-occipital ICH with IVH. There are various densities of blood within the hematoma cavity as well as perihematomal edema. There is no sign of active extravasation. There are no obvious vessel abnormalities. There is approximately 3cm MLS.

## 2023-10-14 NOTE — H&P
[facility]  Neurocritical Care  History & Physical    Admit Date: 10/14/2023  Service Date: 10/14/2023  Length of Stay: 0    Subjective:     Chief Complaint: Nontraumatic cortical hemorrhage of left cerebral hemisphere    History of Present Illness: 74 y/o M with PMH of vascular dementia who presented to ED of [facility] with c/o confusion and difficulty seeing. Wife reports pt was in his usual state of health when he left the house at 3am to go cut grass. He returned home around 5pm, when his wife noticed he was having difficulty turning off his truck. He also reported visual changes and was unable to see any pictures on the wall. In the ED, he c/o generalized weakness and an intermittent L-sided headache x 1 week. Denied any falls or trauma to the area. He initially presented with GCS 15 and no focal deficits. CTH showed large posterior parieto-occipital intracerebral hemorrhage with 5mm shift. He was given Keppra and started on Cardene for BP control. GCS declined from 15 to 13 and pt began projectile vomiting. He was subsequently intubated. [place] [facility] [place] and will be admitted to [facility] for further eval and management.       Past Medical History:   Diagnosis Date    Acid reflux     Cataract     Kidney stone      Past Surgical History:   Procedure Laterality Date    COLONOSCOPY        Current Facility-Administered Medications on File Prior to Encounter   Medication Dose Route Frequency Provider Last Rate Last Admin    [COMPLETED] etomidate injection 20 mg  20 mg Intravenous ED 1 Time Ken Montanez MD   20 mg at 10/13/23 2244    [COMPLETED] fentaNYL 50 mcg/mL injection 100 mcg  100 mcg Intravenous ED 1 Time Ken Montanez MD   100 mcg at 10/13/23 2306    [COMPLETED] levETIRAcetam in NaCl (iso-os) IVPB 1,500 mg  1,500 mg Intravenous ED 1 Time Ken Montanez MD   Stopped at 10/13/23 2259    [COMPLETED] midazolam (VERSED) 1 mg/mL injection 5 mg  5 mg Intravenous ED 1  Time Ken Montanez MD   5 mg at 10/14/23 0035    [COMPLETED] ondansetron injection 8 mg  8 mg Intravenous ED 1 Time Ken Montanez MD   8 mg at 10/13/23 2222    [COMPLETED] rocuronium injection 70 mg  70 mg Intravenous Once Ken Montanez MD   70 mg at 10/13/23 2245    [COMPLETED] sodium chloride 0.9% bolus 1,000 mL 1,000 mL  1,000 mL Intravenous Once Ken Montanez MD   Stopped at 10/13/23 2324    [DISCONTINUED] fentaNYL (SUBLIMAZE) 2,500 mcg in dextrose 5 % (D5W) SolP 250 mL infusion  0-250 mcg/hr Intravenous Continuous Ken Montanez MD 20 mL/hr at 10/14/23 0024 200 mcg/hr at 10/14/23 0024    [DISCONTINUED] niCARdipine 40 mg/200 mL (0.2 mg/mL) infusion  0-15 mg/hr Intravenous Continuous Ken Montanez MD 25 mL/hr at 10/14/23 0005 5 mg/hr at 10/14/23 0005     Current Outpatient Medications on File Prior to Encounter   Medication Sig Dispense Refill    hydrocodone-acetaminophen 7.5-325mg (NORCO) 7.5-325 mg per tablet Take 1-2 tablets by mouth every 4 (four) hours as needed for Pain.       memantine (NAMENDA) 10 MG Tab Take 5 mg by mouth 2 (two) times daily.      MULTIVITAMIN/IRON/FOLIC ACID (CENTRUM COMPLETE ORAL) Take 1 tablet by mouth once daily.        Allergies: Patient has no known allergies.    Family History   Problem Relation Age of Onset    Cancer Mother      Social History     Tobacco Use    Smoking status: Never    Smokeless tobacco: Never   Substance Use Topics    Alcohol use: No    Drug use: No     Review of Systems   Unable to perform ROS: Intubated     Objective:     Vitals:    Temp: 97 °F (36.1 °C)  Pulse: 62  Rhythm: normal sinus rhythm  BP: (!) 146/74  MAP (mmHg): 104  Resp: 13  SpO2: 99 %  Oxygen Concentration (%): 40  Vent Mode: A/C  Set Rate: 12 BPM  Vt Set: 450 mL  PEEP/CPAP: 5 cmH20  Peak Airway Pressure: 16 cmH20  Mean Airway Pressure: 8.9 cmH20  Plateau Pressure: 0 cmH20    Temp  Min: 97 °F (36.1 °C)  Max: 98.1 °F (36.7 °C)  Pulse  Min: 62  Max: 95  BP  Min: 111/64   Max: 244/110  MAP (mmHg)  Min: 78  Max: 162  Resp  Min: 13  Max: 32  SpO2  Min: 94 %  Max: 100 %  Oxygen Concentration (%)  Min: 40  Max: 100    No intake/output data recorded.   Unmeasured Output  Stool Occurrence: 0        Physical Exam  Vitals and nursing note reviewed.   Eyes:      Pupils: Pupils are equal, round, and reactive to light.   Cardiovascular:      Rate and Rhythm: Normal rate and regular rhythm.      Pulses: Normal pulses.      Heart sounds: Normal heart sounds.   Pulmonary:      Effort: Pulmonary effort is normal.      Breath sounds: Normal breath sounds.   Abdominal:      General: Abdomen is flat. Bowel sounds are normal.      Palpations: Abdomen is soft.   Musculoskeletal:         General: Normal range of motion.      Right lower leg: No edema.      Left lower leg: No edema.   Skin:     General: Skin is warm and dry.      Capillary Refill: Capillary refill takes 2 to 3 seconds.   Neurological:      GCS: GCS eye subscore is 2. GCS verbal subscore is 1. GCS motor subscore is 5.      Motor: Motor function is intact.      Comments: E2 V1T M5  PERRL  Brain stem reflexes intact, + cough/gag/corneal  + antigravity equally in all 4 extremities  Moves all extremities purposefully and spontaneously, but does not follow commands  Pt on no sedation during exam       Unable to test orientation, language, memory, judgment, insight, shoulder shrug, tongue protrusion, coordination, gait due to level of consciousness.       Today I personally reviewed pertinent medications, lines/drains/airways, imaging, laboratory results,     Assessment/Plan:     Neuro  * Nontraumatic cortical hemorrhage of left cerebral hemisphere  72 y/o M with PMH of vascular dementia presents with L ICH with IVH from OSH. Denies home anticoagulation use and recent falls/trauma. No seizure activity noted. Repeat CTA with no interval changes, no active bleeding and no evident vessel abnormalities.     E2 V1T M5  PERRL  ICH 2    -Admit to  NCC  -VS/neuro checks q1h  -NSGY consulted  -Vascular neurology consulted  -SBP goal < 140  -PRN labetalol/hydralazine/cardene  -Na+ > 135  -Seizure precautions  -Accuchecks q6h w/ low dose SSI for glycemic control  -MRI brain w/ and w/o contrast    Antithrombotics for secondary stroke prevention: Antiplatelets: None: Intracerebral Hemorrhage    Statins for secondary stroke prevention and hyperlipidemia, if present:   Statins: None: Reason: ICH    Aggressive risk factor modification: None     Rehab efforts: The patient has been evaluated by a stroke team provider and the therapy needs have been fully considered based off the presenting complaints and exam findings. The following therapy evaluations are needed: PT evaluate and treat, OT evaluate and treat, SLP evaluate and treat, PM&R evaluate for appropriate placement    Diagnostics ordered/pending: CTA Head to assess vasculature , HgbA1C to assess blood glucose levels, Lipid Profile to assess cholesterol levels, MRI head without contrast to assess brain parenchyma, TTE to assess cardiac function/status , TSH to assess thyroid function    VTE prophylaxis: None: Reason for No Pharmacological VTE Prophylaxis: History of systemic or intracranial bleeding    BP parameters: ICH: SBP <140        Midline shift of brain  -Neuro checks q1h  -NSGY consulted  -MRI brain w/ and w/o contrast    Vasogenic brain edema  -Neuro checks q1h  -NSGY consulted  -Na+ > 135  -Accuchecks q6h w/ low dose SSI for glycemic control  -MRI brain w/ and w/o contrast    Vascular dementia  -Continue home Namenda    Palliative Care  Endotracheally intubated  Intubated at OSH due to GCS decline    -Daily ABGs  -Daily CXR  -Wean vent as tolerated          The patient is being Prophylaxed for:  Venous Thromboembolism with: Mechanical  Stress Ulcer with: H2B  Ventilator Pneumonia with: chlorhexidine oral care    Activity Orders            Progressive Mobility Protocol (mobilize patient to their highest  level of functioning at least twice daily) starting at 10/14 0800    Turn patient starting at 10/14 0400    Elevate HOB starting at 10/14 0206    Diet NPO: NPO starting at 10/14 0206          Full Code    Critical care time spent on the evaluation and treatment of severe organ dysfunction, review of pertinent labs and imaging studies, discussions with consulting providers and discussions with patient/family: 45 minutes.     Kathy Rondon, BRUCE  Neurocritical Care  [facility] - Neuro Critical Care

## 2023-10-14 NOTE — NURSING
Pt transported to MRI via bed. On continues/portable monitor; Ambu bag at the bedside. PT on portable vent; propofol gtt running at 50mcg.Transported by RN and RT. JENN

## 2023-10-14 NOTE — SUBJECTIVE & OBJECTIVE
Interval History: 10/14: NAEON, AFVSS, Exam stable (very agitated off of sedation). MRI w/ significant amyloid as likely underlying disease process.    Medications:  Continuous Infusions:   niCARdipine      propofoL 50 mcg/kg/min (10/14/23 0910)     Scheduled Meds:   famotidine  20 mg Per OG tube BID    memantine  5 mg Oral BID    multivitamin  1 tablet Per OG tube Daily    senna-docusate 8.6-50 mg  1 tablet Per OG tube BID     PRN Meds:acetaminophen, dextrose 10%, dextrose 10%, glucagon (human recombinant), hydrALAZINE, insulin aspart U-100, labetalol, ondansetron, sodium chloride 0.9%     Review of Systems  Objective:     Weight: 64 kg (141 lb)  Body mass index is 23.46 kg/m².  Vital Signs (Most Recent):  Temp: 98 °F (36.7 °C) (10/14/23 1101)  Pulse: (!) 59 (10/14/23 1201)  Resp: 18 (10/14/23 1201)  BP: (!) 142/101 (10/14/23 1201)  SpO2: 99 % (10/14/23 1201) Vital Signs (24h Range):  Temp:  [97 °F (36.1 °C)-98.2 °F (36.8 °C)] 98 °F (36.7 °C)  Pulse:  [58-95] 59  Resp:  [12-39] 18  SpO2:  [94 %-100 %] 99 %  BP: (111-244)/() 142/101          Vent Mode: A/C  Oxygen Concentration (%):  [] 40  Resp Rate Total:  [12 br/min-28 br/min] 19 br/min  Vt Set:  [450 mL-500 mL] 450 mL  PEEP/CPAP:  [5 cmH20] 5 cmH20  Mean Airway Pressure:  [6.8 cmH20-8.9 cmH20] 6.9 cmH20             NG/OG Tube 10/13/23 Center mouth (Active)   Placement Check placement verified by x-ray;placement verified by distal tube length measurement 10/14/23 7211   Tolerance no signs/symptoms of discomfort 10/14/23 1101   Securement secured to commercial device 10/14/23 1101   Clamp Status/Tolerance clamped;no restlessness;no nausea;no emesis;no abdominal distention;no abdominal discomfort;no residual 10/14/23 1101   Suction Setting/Drainage Method suction at the bedside 10/14/23 1101   Insertion Site Appearance no redness, warmth, tenderness, skin breakdown, drainage 10/14/23 1101   Drainage None 10/14/23 0507   Current Rate (mL/hr) 0 mL/hr  "10/14/23 0148          Physical Exam   Neurosurgery Physical Exam  E2VtM5, sedated on propofol (agitated when off sedation);  +C/G/C, PERRL, No gaze preference;  Artie and purposefully when off sedation      Significant Labs:  Recent Labs   Lab 10/13/23  2210 10/14/23  0404   * 129*    140   K 4.1 4.8    108   CO2 24 21*   BUN 21* 19   CREATININE 0.91 0.9   CALCIUM 10.2 8.5*   MG  --  1.9     Recent Labs   Lab 10/13/23  2210 10/14/23  0404   WBC 17.10* 13.28*   HGB 15.4 13.8*   HCT 46.0 40.7    266     Recent Labs   Lab 10/13/23  2229 10/14/23  0404   LABPT 12.7  --    INR 0.9 1.0   APTT 24.4* 22.8     Microbiology Results (last 7 days)       ** No results found for the last 168 hours. **          ABGs:   Recent Labs   Lab 10/14/23  0601   PH 7.349*   PCO2 48.5*   PO2 132*   HCO3 26.7   POCSATURATED 99   BE 1     Cardiac markers:   Recent Labs   Lab 10/13/23  2210   TROPONINI <0.012     CMP:   Recent Labs   Lab 10/13/23  2210 10/14/23  0404   * 129*   CALCIUM 10.2 8.5*   ALBUMIN 4.7 3.4*   PROT 8.2 6.6    140   K 4.1 4.8   CO2 24 21*    108   BUN 21* 19   CREATININE 0.91 0.9   ALKPHOS 89 68   ALT 22 10   AST 35 28   BILITOT 1.4* 1.0     CRP: No results for input(s): "CRP" in the last 48 hours.  ESR: No results for input(s): "POCESR", "ERYTHROCYTES" in the last 48 hours.  LFTs:   Recent Labs   Lab 10/13/23  2210 10/14/23  0404   ALT 22 10   AST 35 28   ALKPHOS 89 68   BILITOT 1.4* 1.0   PROT 8.2 6.6   ALBUMIN 4.7 3.4*     Procalcitonin: No results for input(s): "PROCAL" in the last 48 hours.    Significant Diagnostics:  I have reviewed all pertinent imaging results/findings within the past 24 hours.  "

## 2023-10-14 NOTE — PLAN OF CARE
[facility] Care Plan    POC reviewed with Kobi Pittman and family at 1400. Pt with expressive aphasia unable to verbalize understanding. Family at the bedside, verbalized understanding Questions and concerns addressed. No acute events today. Pt progressing toward goals. Will continue to monitor. See below and flowsheets for full assessment and VS info.     -Spouse and family updated at the bedside.   -Pt extubated at 11:40am/ Tolerating 2L NC well.   -Propofol dc per order. Cardene gtt stopped at 0630am; VS stable throughout shift.   -Pt confused with expressive aphasia. Following commands with delayed response.   -pt with anxiety and scared due to difficulty seeing. Care explained and emotional support provided.         Is this a stroke patient? yes- Stroke booklet reviewed with family, risk factors identified for patient and stroke booklet remains at bedside for ongoing education.     Neuro:  [place] Coma Scale  Best Eye Response: 4-->(E4) spontaneous  Best Motor Response: 6-->(M6) obeys commands  Best Verbal Response: 4-->(V4) confused  [place] Coma Scale Score: 14  Assessment Qualifiers: patient intubated, patient chemically sedated or paralyzed  Pupil PERRLA: yes     24 hr Temp:  [97 °F (36.1 °C)-98.2 °F (36.8 °C)]     CV:   Rhythm: sinus bradycardia  BP goals:   SBP < 140  MAP > 65    Resp:      Vent Mode: A/C  Set Rate: 12 BPM  Oxygen Concentration (%): 40  Vt Set: 450 mL  PEEP/CPAP: 5 cmH20    Plan: N/A    GI/:     Diet/Nutrition Received: NPO  Last Bowel Movement: 10/13/23  Voiding Characteristics: due to void    Intake/Output Summary (Last 24 hours) at 10/14/2023 1824  Last data filed at 10/14/2023 1801  Gross per 24 hour   Intake 1.62 ml   Output 740 ml   Net -738.38 ml     Unmeasured Output  Stool Occurrence: 0    Labs/Accuchecks:  Recent Labs   Lab 10/14/23  0404   WBC 13.28*   RBC 4.34*   HGB 13.8*   HCT 40.7         Recent Labs   Lab 10/14/23  0404      K 4.8   CO2 21*      BUN 19    CREATININE 0.9   ALKPHOS 68   ALT 10   AST 28   BILITOT 1.0      Recent Labs   Lab 10/14/23  0404   INR 1.0   APTT 22.8      Recent Labs   Lab 10/13/23  2210   CPK 92   TROPONINI <0.012       Electrolytes: N/A - electrolytes WDL  Accuchecks: none    Gtts:   niCARdipine      propofoL 50 mcg/kg/min (10/14/23 0910)       LDA/Wounds:  Lines/Drains/Airways       Peripheral Intravenous Line  Duration                  Peripheral IV - Single Lumen 10/13/23 2210 20 G Left Antecubital <1 day         Peripheral IV - Single Lumen 10/13/23 2258 18 G Anterior;Distal;Right Upper Arm <1 day                  Wounds: No  Wound care consulted: No

## 2023-10-14 NOTE — SUBJECTIVE & OBJECTIVE
Past Medical History:   Diagnosis Date    Acid reflux     Cataract     Kidney stone      Past Surgical History:   Procedure Laterality Date    COLONOSCOPY        Current Facility-Administered Medications on File Prior to Encounter   Medication Dose Route Frequency Provider Last Rate Last Admin    [COMPLETED] etomidate injection 20 mg  20 mg Intravenous ED 1 Time Ken Montanez MD   20 mg at 10/13/23 2244    [COMPLETED] fentaNYL 50 mcg/mL injection 100 mcg  100 mcg Intravenous ED 1 Time Ken Montanez MD   100 mcg at 10/13/23 2306    [COMPLETED] levETIRAcetam in NaCl (iso-os) IVPB 1,500 mg  1,500 mg Intravenous ED 1 Time Ken Montanez MD   Stopped at 10/13/23 2259    [COMPLETED] midazolam (VERSED) 1 mg/mL injection 5 mg  5 mg Intravenous ED 1 Time Ken Montanez MD   5 mg at 10/14/23 0035    [COMPLETED] ondansetron injection 8 mg  8 mg Intravenous ED 1 Time Ken Montanez MD   8 mg at 10/13/23 2222    [COMPLETED] rocuronium injection 70 mg  70 mg Intravenous Once Ken Montanez MD   70 mg at 10/13/23 2245    [COMPLETED] sodium chloride 0.9% bolus 1,000 mL 1,000 mL  1,000 mL Intravenous Once Ken Montanez MD   Stopped at 10/13/23 2324    [DISCONTINUED] fentaNYL (SUBLIMAZE) 2,500 mcg in dextrose 5 % (D5W) SolP 250 mL infusion  0-250 mcg/hr Intravenous Continuous Ken Montanez MD 20 mL/hr at 10/14/23 0024 200 mcg/hr at 10/14/23 0024    [DISCONTINUED] niCARdipine 40 mg/200 mL (0.2 mg/mL) infusion  0-15 mg/hr Intravenous Continuous Ken Montanez MD 25 mL/hr at 10/14/23 0005 5 mg/hr at 10/14/23 0005     Current Outpatient Medications on File Prior to Encounter   Medication Sig Dispense Refill    hydrocodone-acetaminophen 7.5-325mg (NORCO) 7.5-325 mg per tablet Take 1-2 tablets by mouth every 4 (four) hours as needed for Pain.       memantine (NAMENDA) 10 MG Tab Take 5 mg by mouth 2 (two) times daily.      MULTIVITAMIN/IRON/FOLIC ACID (CENTRUM COMPLETE ORAL) Take 1 tablet by mouth  once daily.        Allergies: Patient has no known allergies.    Family History   Problem Relation Age of Onset    Cancer Mother      Social History     Tobacco Use    Smoking status: Never    Smokeless tobacco: Never   Substance Use Topics    Alcohol use: No    Drug use: No     Review of Systems   Unable to perform ROS: Intubated     Objective:     Vitals:    Temp: 97 °F (36.1 °C)  Pulse: 62  Rhythm: normal sinus rhythm  BP: (!) 146/74  MAP (mmHg): 104  Resp: 13  SpO2: 99 %  Oxygen Concentration (%): 40  Vent Mode: A/C  Set Rate: 12 BPM  Vt Set: 450 mL  PEEP/CPAP: 5 cmH20  Peak Airway Pressure: 16 cmH20  Mean Airway Pressure: 8.9 cmH20  Plateau Pressure: 0 cmH20    Temp  Min: 97 °F (36.1 °C)  Max: 98.1 °F (36.7 °C)  Pulse  Min: 62  Max: 95  BP  Min: 111/64  Max: 244/110  MAP (mmHg)  Min: 78  Max: 162  Resp  Min: 13  Max: 32  SpO2  Min: 94 %  Max: 100 %  Oxygen Concentration (%)  Min: 40  Max: 100    No intake/output data recorded.   Unmeasured Output  Stool Occurrence: 0        Physical Exam  Vitals and nursing note reviewed.   Eyes:      Pupils: Pupils are equal, round, and reactive to light.   Cardiovascular:      Rate and Rhythm: Normal rate and regular rhythm.      Pulses: Normal pulses.      Heart sounds: Normal heart sounds.   Pulmonary:      Effort: Pulmonary effort is normal.      Breath sounds: Normal breath sounds.   Abdominal:      General: Abdomen is flat. Bowel sounds are normal.      Palpations: Abdomen is soft.   Musculoskeletal:         General: Normal range of motion.      Right lower leg: No edema.      Left lower leg: No edema.   Skin:     General: Skin is warm and dry.      Capillary Refill: Capillary refill takes 2 to 3 seconds.   Neurological:      GCS: GCS eye subscore is 2. GCS verbal subscore is 1. GCS motor subscore is 5.      Motor: Motor function is intact.      Comments: E2 V1T M5  PERRL  Brain stem reflexes intact, + cough/gag/corneal  + antigravity equally in all 4 extremities  Moves  all extremities purposefully and spontaneously, but does not follow commands  Pt on no sedation during exam       Unable to test orientation, language, memory, judgment, insight, shoulder shrug, tongue protrusion, coordination, gait due to level of consciousness.       Today I personally reviewed pertinent medications, lines/drains/airways, imaging, laboratory results,

## 2023-10-14 NOTE — NURSING
Pt transported back to room via bed. Monitor hocked back up to pt prior transfer, ambu bag at the bedside. On portable ventilator. MRI successful. Transported by RN and RT. Prop continued per order due to agitation and risk of possible self extubation. JENN

## 2023-10-14 NOTE — CONSULTS
[facility]  Vascular Neurology  [facility]  Consult Note    Inpatient consult to Vascular (Stroke) Neurology  Consult performed by: Perri Yepez NP  Consult ordered by: Kathy Rondon NP  Reason for consult: L ICH with IVH non traumatic        Assessment/Plan:     Patient is a 73 y.o. year old male with:    * Nontraumatic cortical hemorrhage of left cerebral hemisphere  72 y/o male with L ICH with IVH non traumatic    Antithrombotics: None ICH    Statins: None ICH    Aggressive risk factor modification: None     Rehab efforts: The patient has been evaluated by a stroke team provider and the therapy needs have been fully considered based off the presenting complaints and exam findings. The following therapy evaluations are needed: PT evaluate and treat, OT evaluate and treat, SLP evaluate and treat, PM&R evaluate for appropriate placement    Diagnostics ordered/pending: HgbA1C to assess blood glucose levels, Lipid Profile to assess cholesterol levels, MRI head without contrast to assess brain parenchyma, TTE to assess cardiac function/status     VTE prophylaxis: Mechanical prophylaxis: Place SCDs  None: Reason for No Pharmacological VTE Prophylaxis: History of systemic or intracranial bleeding    BP parameters: ICH: SBP <140        Vasogenic brain edema  As seen on cerebral imaging, close monitoring of neuro status and serial CT scans as needed and Neurosurgery consult to monitor as well    Midline shift of brain  As seen on cerebral imaging        STROKE DOCUMENTATION          NIH Scale:  1a. Level of Consciousness: 3-->Responds only with reflex motor or autonomic effects or totally unresponsive, flaccid, and areflexic  1b. LOC Questions: 2-->Answers neither question correctly  1c. LOC Commands: 2-->Performs neither task correctly  2. Best Gaze: 0-->Normal  3. Visual: 1-->Partial hemianopia  4. Facial Palsy: 0-->Normal symmetrical movements  5a. Motor Arm, Left:  2-->Some effort against gravity, limb cannot get to or maintain (if cued) 90 (or 45) degrees, drifts down to bed, but has some effort against gravity  5b. Motor Arm, Right: 2-->Some effort against gravity, limb cannot get to or maintain (if cued) 90 (or 45) degrees, drifts down to bed, but has some effort against gravity  6a. Motor Leg, Left: 2-->Some effort against gravity, leg falls to bed by 5 secs, but has some effort against gravity  6b. Motor Leg, Right: 2-->Some effort against gravity, leg falls to bed by 5 secs, but has some effort against gravity  7. Limb Ataxia: 0-->Absent  8. Sensory: 0-->Normal, no sensory loss  9. Best Language: 3-->Mute, global aphasia, no usable speech or auditory comprehension  10. Dysarthria: (UN) Intubated or other physical barrier  11. Extinction and Inattention (formerly Neglect): 0-->No abnormality  Total (NIH Stroke Scale): 19    Modified [place] Score: 0  [place] Coma Scale:8   ABCD2 Score:    [id number] Score:   HAS -BLED Score:   ICH Score:2  Hunt & Reese Classification:       Thrombolysis Candidate? No, CT findings (ICH, SAH, Large core infarct)     Delays to Thrombolysis?  Not Applicable    Interventional Revascularization Candidate?   Is the patient eligible for mechanical endovascular reperfusion (CHAPIS)?  NO ICH    Delays to Thrombectomy? Not Applicable    Hemorrhagic change of an Ischemic Stroke: Does this patient have an ischemic stroke with hemorrhagic changes? No     Subjective:     History of Present Illness:  73-year-old male coming to the ER at [facility] with a chief complaint of confusion and difficulty seeing.  The patient's family member stated that he was in his usual state of health this morning he left the house around 3:00 a.m. on 10-13-23 to go cut some grass when he returned around 5 the patient's wife noted that he was having difficulty turning off his truck he was having difficulty seeing you could not see any pictures on the wall  he had no numbness complains of some generalized weakness and a headache on the left side it has been off and on for about a week.  He denies any trauma    Patient was intubated prior to transfer as decrease in GCS and patient vomited. He was placed on Cardene drip and sedation.    Patient was direct transfer to NICU.    Received call about consult at 0306.          Past Medical History:   Diagnosis Date    Acid reflux     Cataract     Kidney stone      Past Surgical History:   Procedure Laterality Date    COLONOSCOPY       Family History   Problem Relation Age of Onset    Cancer Mother      Social History     Tobacco Use    Smoking status: Never    Smokeless tobacco: Never   Substance Use Topics    Alcohol use: No    Drug use: No     Review of patient's allergies indicates:  No Known Allergies    Medications: I have reviewed the current medication administration record.    Medications Prior to Admission   Medication Sig Dispense Refill Last Dose    hydrocodone-acetaminophen 7.5-325mg (NORCO) 7.5-325 mg per tablet Take 1-2 tablets by mouth every 4 (four) hours as needed for Pain.        memantine (NAMENDA) 10 MG Tab Take 5 mg by mouth 2 (two) times daily.       MULTIVITAMIN/IRON/FOLIC ACID (CENTRUM COMPLETE ORAL) Take 1 tablet by mouth once daily.          Review of Systems   Unable to perform ROS: Intubated     Objective:     Vital Signs (Most Recent):  Temp: 97 °F (36.1 °C) (10/14/23 0305)  Pulse: (!) 58 (10/14/23 0409)  Resp: 14 (10/14/23 0405)  BP: (!) 150/74 (10/14/23 0405)  SpO2: 99 % (10/14/23 0405)    Vital Signs Range (Last 24H):  Temp:  [97 °F (36.1 °C)-98.1 °F (36.7 °C)]   Pulse:  [58-95]   Resp:  [13-32]   BP: (111-244)/()   SpO2:  [94 %-100 %]        Physical Exam  Vitals and nursing note reviewed.   Constitutional:       Comments: obtunded   HENT:      Head: Normocephalic and atraumatic.   Eyes:      Comments: Visual loss on left    Cardiovascular:      Rate and Rhythm: Normal rate  "and regular rhythm.   Pulmonary:      Comments: Intubated on vent  Abdominal:      General: Abdomen is flat. Bowel sounds are normal.      Palpations: Abdomen is soft.   Musculoskeletal:         General: Normal range of motion.      Cervical back: Normal range of motion.   Skin:     General: Skin is warm and dry.   Neurological:      Motor: Weakness present.      Comments: Intubated on vent              Neurological Exam:   LOC: obtunded  Attention Span: obtubded  Language: Intubated  Articulation: Untestable due to intubation  Orientation: Untestable due to intubation  Visual Fields: Hemianopsia left  EOM (CN III, IV, VI): Full/intact  Pupils (CN II, III): PERRL  Facial Sensation (CN V): Normal  Facial Movement (CN VII): Unable to test   Gag Reflex: present  Reflexes: flexor plantar responses bilaterally  Motor: moves to noxious stim  Cerebellum: No evidence of appendicular or axial ataxia  Sensation: Intact to light touch, temperature and vibration  Tone: Flaccid  LUE , LLE, RUE, and RLE       Laboratory:  CMP:   Recent Labs   Lab 10/13/23  2210   CALCIUM 10.2   ALBUMIN 4.7   PROT 8.2      K 4.1   CO2 24      BUN 21*   CREATININE 0.91   ALKPHOS 89   ALT 22   AST 35   BILITOT 1.4*     CBC:   Recent Labs   Lab 10/13/23  2210   WBC 17.10*   RBC 4.87   HGB 15.4   HCT 46.0      MCV 95   MCH 31.7*   MCHC 33.5     Lipid Panel: No results for input(s): "CHOL", "LDLCALC", "HDL", "TRIG" in the last 168 hours.  Coagulation:   Recent Labs   Lab 10/13/23  2229   INR 0.9   APTT 24.4*     Hgb A1C: No results for input(s): "HGBA1C" in the last 168 hours.  TSH:   Recent Labs   Lab 10/13/23  2210   TSH 2.16       Diagnostic Results:      Brain imaging:  CT head w/o contrast 10-13-23 results:      Vessel Imaging:      Cardiac Evaluation:   EKG 10-13-23 results:  Normal sinus rhythm Right bundle branch block Left anterior fascicular blockBifascicular block  Voltage criteria for left ventricular hypertrophy T wave " abnormality, consider inferolateral ischemia Abnormal ECG When compared with ECG of 13-OCT-2023 22:26, Criteria for Septal infarct are no longer Present T wave inversion less evident in Inferior leads          Perri Yepez, NP  [facility]  Department of Vascular Neurology   [facility]

## 2023-10-14 NOTE — ASSESSMENT & PLAN NOTE
73M h/o vascular dementia presents as transfer from [facility] with a chief complaint of confusion and difficulty seeing, and HA. CTA on presentation demonstrates a grossly stable 4.5x4.3x3.0cm L parieto-occipital ICH with IVH (ICHS 2). There are various densities of blood within the hematoma cavity as well as perihematomal edema. There is no sign of active extravasation. There are no obvious vessel abnormalities. There is approximately 3cm MLS.     --Patient admitted to ICU on telemetry      -q1h neurochecks in ICU  --All labs and diagnostics reviewed      -CTH/CTA 10/14: grossly stable 4.5x4.3x3.0cm L parieto-occipital ICH w/ IVH; No underlying vascular lesion      -MRI w/wo 10/14: Significant amyloid as likely etiology of bleed, stable ICH  --Hold therapeutic anti-plt/coag medications  --SBP <140  --Na >135  --AED per NCC  --HOB >30  --WTE per NCC  --ADAT per NCC  --Stable for SQH per primary team   --No NSGY intervention at this time  --Continued work-up and management per NCC/Stroke teams  --Continue to monitor clinically, notify NSGY immediately with any changes in neuro status    Dispo: Per NCC/Stroke teams

## 2023-10-15 LAB
ALBUMIN SERPL BCP-MCNC: 3.4 G/DL (ref 3.5–5.2)
ALP SERPL-CCNC: 75 U/L (ref 55–135)
ALT SERPL W/O P-5'-P-CCNC: 9 U/L (ref 10–44)
ANION GAP SERPL CALC-SCNC: 10 MMOL/L (ref 8–16)
AST SERPL-CCNC: 18 U/L (ref 10–40)
BACTERIA UR CULT: NO GROWTH
BASOPHILS # BLD AUTO: 0.02 K/UL (ref 0–0.2)
BASOPHILS NFR BLD: 0.2 % (ref 0–1.9)
BILIRUB SERPL-MCNC: 1.2 MG/DL (ref 0.1–1)
BUN SERPL-MCNC: 17 MG/DL (ref 8–23)
CALCIUM SERPL-MCNC: 9.2 MG/DL (ref 8.7–10.5)
CHLORIDE SERPL-SCNC: 109 MMOL/L (ref 95–110)
CO2 SERPL-SCNC: 20 MMOL/L (ref 23–29)
CREAT SERPL-MCNC: 0.8 MG/DL (ref 0.5–1.4)
DIFFERENTIAL METHOD: ABNORMAL
EOSINOPHIL # BLD AUTO: 0 K/UL (ref 0–0.5)
EOSINOPHIL NFR BLD: 0.1 % (ref 0–8)
ERYTHROCYTE [DISTWIDTH] IN BLOOD BY AUTOMATED COUNT: 12.4 % (ref 11.5–14.5)
EST. GFR  (NO RACE VARIABLE): >60 ML/MIN/1.73 M^2
GLUCOSE SERPL-MCNC: 127 MG/DL (ref 70–110)
HCT VFR BLD AUTO: 39.7 % (ref 40–54)
HGB BLD-MCNC: 13.4 G/DL (ref 14–18)
IMM GRANULOCYTES # BLD AUTO: 0.06 K/UL (ref 0–0.04)
IMM GRANULOCYTES NFR BLD AUTO: 0.5 % (ref 0–0.5)
LYMPHOCYTES # BLD AUTO: 0.9 K/UL (ref 1–4.8)
LYMPHOCYTES NFR BLD: 7.4 % (ref 18–48)
MAGNESIUM SERPL-MCNC: 1.7 MG/DL (ref 1.6–2.6)
MCH RBC QN AUTO: 31.7 PG (ref 27–31)
MCHC RBC AUTO-ENTMCNC: 33.8 G/DL (ref 32–36)
MCV RBC AUTO: 94 FL (ref 82–98)
MONOCYTES # BLD AUTO: 0.5 K/UL (ref 0.3–1)
MONOCYTES NFR BLD: 4.2 % (ref 4–15)
NEUTROPHILS # BLD AUTO: 10.4 K/UL (ref 1.8–7.7)
NEUTROPHILS NFR BLD: 87.6 % (ref 38–73)
NRBC BLD-RTO: 0 /100 WBC
PHOSPHATE SERPL-MCNC: 2.6 MG/DL (ref 2.7–4.5)
PLATELET # BLD AUTO: 237 K/UL (ref 150–450)
PLATELET BLD QL SMEAR: ABNORMAL
PMV BLD AUTO: 9.6 FL (ref 9.2–12.9)
POCT GLUCOSE: 156 MG/DL (ref 70–110)
POTASSIUM SERPL-SCNC: 3.9 MMOL/L (ref 3.5–5.1)
PROT SERPL-MCNC: 6.5 G/DL (ref 6–8.4)
RBC # BLD AUTO: 4.23 M/UL (ref 4.6–6.2)
SODIUM SERPL-SCNC: 139 MMOL/L (ref 136–145)
WBC # BLD AUTO: 11.89 K/UL (ref 3.9–12.7)

## 2023-10-15 PROCEDURE — 85025 COMPLETE CBC W/AUTO DIFF WBC: CPT | Performed by: REGISTERED NURSE

## 2023-10-15 PROCEDURE — 63600175 PHARM REV CODE 636 W HCPCS: Performed by: REGISTERED NURSE

## 2023-10-15 PROCEDURE — 99291 CRITICAL CARE FIRST HOUR: CPT | Mod: ,,,

## 2023-10-15 PROCEDURE — 99233 SBSQ HOSP IP/OBS HIGH 50: CPT | Mod: GC,,, | Performed by: PSYCHIATRY & NEUROLOGY

## 2023-10-15 PROCEDURE — 99233 PR SUBSEQUENT HOSPITAL CARE,LEVL III: ICD-10-PCS | Mod: GC,,, | Performed by: PSYCHIATRY & NEUROLOGY

## 2023-10-15 PROCEDURE — 25000003 PHARM REV CODE 250: Performed by: REGISTERED NURSE

## 2023-10-15 PROCEDURE — 92610 EVALUATE SWALLOWING FUNCTION: CPT

## 2023-10-15 PROCEDURE — 80053 COMPREHEN METABOLIC PANEL: CPT | Performed by: REGISTERED NURSE

## 2023-10-15 PROCEDURE — 20000000 HC ICU ROOM

## 2023-10-15 PROCEDURE — 94761 N-INVAS EAR/PLS OXIMETRY MLT: CPT

## 2023-10-15 PROCEDURE — 83735 ASSAY OF MAGNESIUM: CPT | Performed by: REGISTERED NURSE

## 2023-10-15 PROCEDURE — 84100 ASSAY OF PHOSPHORUS: CPT | Performed by: REGISTERED NURSE

## 2023-10-15 PROCEDURE — 25000003 PHARM REV CODE 250

## 2023-10-15 PROCEDURE — 63600175 PHARM REV CODE 636 W HCPCS

## 2023-10-15 PROCEDURE — 27000221 HC OXYGEN, UP TO 24 HOURS

## 2023-10-15 PROCEDURE — 99291 PR CRITICAL CARE, E/M 30-74 MINUTES: ICD-10-PCS | Mod: ,,,

## 2023-10-15 RX ORDER — METOCLOPRAMIDE HYDROCHLORIDE 5 MG/ML
10 INJECTION INTRAMUSCULAR; INTRAVENOUS ONCE
Status: COMPLETED | OUTPATIENT
Start: 2023-10-15 | End: 2023-10-15

## 2023-10-15 RX ORDER — OXYCODONE HYDROCHLORIDE 5 MG/1
5 TABLET ORAL EVERY 6 HOURS PRN
Status: DISCONTINUED | OUTPATIENT
Start: 2023-10-15 | End: 2023-10-20 | Stop reason: HOSPADM

## 2023-10-15 RX ORDER — ONDANSETRON 2 MG/ML
4 INJECTION INTRAMUSCULAR; INTRAVENOUS ONCE
Status: COMPLETED | OUTPATIENT
Start: 2023-10-15 | End: 2023-10-15

## 2023-10-15 RX ORDER — QUETIAPINE FUMARATE 25 MG/1
25 TABLET, FILM COATED ORAL NIGHTLY
Status: DISCONTINUED | OUTPATIENT
Start: 2023-10-15 | End: 2023-10-16

## 2023-10-15 RX ORDER — SODIUM,POTASSIUM PHOSPHATES 280-250MG
2 POWDER IN PACKET (EA) ORAL
Status: DISCONTINUED | OUTPATIENT
Start: 2023-10-15 | End: 2023-10-19

## 2023-10-15 RX ORDER — MAGNESIUM SULFATE HEPTAHYDRATE 40 MG/ML
2 INJECTION, SOLUTION INTRAVENOUS ONCE
Status: COMPLETED | OUTPATIENT
Start: 2023-10-15 | End: 2023-10-15

## 2023-10-15 RX ORDER — LANOLIN ALCOHOL/MO/W.PET/CERES
800 CREAM (GRAM) TOPICAL
Status: DISCONTINUED | OUTPATIENT
Start: 2023-10-15 | End: 2023-10-19

## 2023-10-15 RX ORDER — QUETIAPINE FUMARATE 25 MG/1
25 TABLET, FILM COATED ORAL DAILY
Status: CANCELLED | OUTPATIENT
Start: 2023-10-15

## 2023-10-15 RX ADMIN — SODIUM CHLORIDE 500 ML: 0.9 INJECTION, SOLUTION INTRAVENOUS at 03:10

## 2023-10-15 RX ADMIN — MEMANTINE HYDROCHLORIDE 5 MG: 5 TABLET ORAL at 08:10

## 2023-10-15 RX ADMIN — SENNOSIDES AND DOCUSATE SODIUM 1 TABLET: 50; 8.6 TABLET ORAL at 08:10

## 2023-10-15 RX ADMIN — ONDANSETRON 4 MG: 2 INJECTION INTRAMUSCULAR; INTRAVENOUS at 11:10

## 2023-10-15 RX ADMIN — ACETAMINOPHEN 650 MG: 325 TABLET ORAL at 10:10

## 2023-10-15 RX ADMIN — FAMOTIDINE 20 MG: 20 TABLET ORAL at 08:10

## 2023-10-15 RX ADMIN — THERA TABS 1 TABLET: TAB at 08:10

## 2023-10-15 RX ADMIN — NICARDIPINE HYDROCHLORIDE 2.5 MG/HR: 0.2 INJECTION, SOLUTION INTRAVENOUS at 03:10

## 2023-10-15 RX ADMIN — QUETIAPINE FUMARATE 25 MG: 25 TABLET ORAL at 08:10

## 2023-10-15 RX ADMIN — MAGNESIUM SULFATE HEPTAHYDRATE 2 G: 40 INJECTION, SOLUTION INTRAVENOUS at 03:10

## 2023-10-15 RX ADMIN — ONDANSETRON 4 MG: 2 INJECTION INTRAMUSCULAR; INTRAVENOUS at 08:10

## 2023-10-15 RX ADMIN — OXYCODONE HYDROCHLORIDE 5 MG: 5 TABLET ORAL at 04:10

## 2023-10-15 RX ADMIN — METOCLOPRAMIDE 10 MG: 5 INJECTION, SOLUTION INTRAMUSCULAR; INTRAVENOUS at 03:10

## 2023-10-15 RX ADMIN — ACETAMINOPHEN 650 MG: 325 TABLET ORAL at 04:10

## 2023-10-15 NOTE — PLAN OF CARE
[facility] Care Plan    POC reviewed with Kobi Luevanoin and spouse at 0200. Pt and spouse verbalized understanding. Questions and concerns addressed. No acute events overnight. Pt progressing toward goals. Will continue to monitor. See below and flowsheets for full assessment and VS info.     - PRN labetalol given x 1  - PRN hydralazine given x 1  - Cardene gtt restarted to maintain SBP <140  - Bath given, linens changed          Is this a stroke patient? yes- Stroke booklet reviewed with patient and family, risk factors identified for patient and stroke booklet remains at bedside for ongoing education.     Neuro:  [place] Coma Scale  Best Eye Response: 4-->(E4) spontaneous  Best Motor Response: 6-->(M6) obeys commands  Best Verbal Response: 4-->(V4) confused  [place] Coma Scale Score: 14  Assessment Qualifiers: patient not sedated/intubated  Pupil PERRLA: yes     24hr Temp:  [98 °F (36.7 °C)-98.5 °F (36.9 °C)]     CV:   Rhythm: sinus bradycardia  BP goals:   SBP < 140  MAP > 65    Resp:      Vent Mode: A/C  Set Rate: 12 BPM  Oxygen Concentration (%): 40  Vt Set: 450 mL  PEEP/CPAP: 5 cmH20    Plan: N/A    GI/:     Diet/Nutrition Received: sips of water  Last Bowel Movement: 10/13/23  Voiding Characteristics: voids spontaneously without difficulty    Intake/Output Summary (Last 24 hours) at 10/15/2023 0623  Last data filed at 10/15/2023 0605  Gross per 24 hour   Intake 37.77 ml   Output 290 ml   Net -252.23 ml     Unmeasured Output  Stool Occurrence: 0    Labs/Accuchecks:  Recent Labs   Lab 10/15/23  0241   WBC 11.89   RBC 4.23*   HGB 13.4*   HCT 39.7*         Recent Labs   Lab 10/15/23  0241      K 3.9   CO2 20*      BUN 17   CREATININE 0.8   ALKPHOS 75   ALT 9*   AST 18   BILITOT 1.2*      Recent Labs   Lab 10/14/23  0404   INR 1.0   APTT 22.8      Recent Labs   Lab 10/13/23  2210   CPK 92   TROPONINI <0.012       Electrolytes: Electrolytes replaced  Accuchecks: Q6H    Gtts:   niCARdipine 2.5 mg/hr  (10/15/23 0605)    propofoL 50 mcg/kg/min (10/14/23 0910)       LDA/Wounds:  Lines/Drains/Airways       Peripheral Intravenous Line  Duration                  Peripheral IV - Single Lumen 10/13/23 2210 20 G Left Antecubital 1 day         Peripheral IV - Single Lumen 10/13/23 2258 18 G Anterior;Distal;Right Upper Arm 1 day                  Wounds: No  Wound care consulted: No

## 2023-10-15 NOTE — ASSESSMENT & PLAN NOTE
73M h/o vascular dementia presents as transfer from [facility] with a chief complaint of confusion and difficulty seeing, and HA. CTA on presentation demonstrates a grossly stable 4.5x4.3x3.0cm L parieto-occipital ICH with IVH (ICHS 2). There are various densities of blood within the hematoma cavity as well as perihematomal edema. There is no sign of active extravasation. There are no obvious vessel abnormalities. There is approximately 3cm MLS.     --Patient admitted to ICU on telemetry      -q1h neurochecks in ICU  --All labs and diagnostics reviewed      -CTH/CTA 10/14: grossly stable 4.5x4.3x3.0cm L parieto-occipital ICH w/ IVH; No underlying vascular lesion      -MRI w/wo 10/14: Significant amyloid as likely etiology of bleed, stable ICH  --Hold therapeutic anti-plt/coag medications  --SBP <140  --Na >135  --AED per NCC  --HOB >30  --ADAT per NCC  --Stable for SQH per primary team   --No NSGY intervention at this time; NSGY will sign-off at this time  --Continued work-up and management per NCC/Stroke teams    Dispo: Per NCC/Stroke teams

## 2023-10-15 NOTE — PT/OT/SLP PROGRESS
Occupational Therapy      Patient Name:  Kobi Pittman   MRN:  [id number]    Patient not seen today secondary to off floor for CT scan. OT orders acknowledged. Will follow-up as appropriate.    10/15/2023

## 2023-10-15 NOTE — ASSESSMENT & PLAN NOTE
-Neuro checks q1h  -NSGY consulted, signed off  -Na+ > 135  -Accuchecks q6h w/ low dose SSI for glycemic control  -MRI brain w/ and w/o contrast similar appearance of left temporal occipital intraparenchymal hemorrhage, left subdural hemorrhage, and intraventricular extension. No underlying enhancing intracranial lesion. 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

## 2023-10-15 NOTE — PROGRESS NOTES
[facility]  Neurosurgery  Progress Note    Subjective:     History of Present Illness: 73M h/o vascular dementia presents as transfer from [facility] with a chief complaint of confusion and difficulty seeing, and HA. The patient's family member stated that he was in his usual state of health this morning he left the house around 2:00 p.m. on 10-13-23 to cut some grass. When he returned around 5:00pm the patient's wife noted that he was having difficulty turning off his truck and he was having difficulty seeing. He also reported generalized weakness and a headache on the left side it has been off and on for about a week.  Denies trauma, fever/chills, hearing changes, dysphagia, dysarthria, nausea, new-onset weakness or sensory change, bowel/bladder changes. Patient was intubated prior to transfer as decrease in GCS and patient vomited. He was placed on Cardene drip and sedation.    CTA on presentation demonstrates a grossly stable 4.5x4.3x3.0cm L parieto-occipital ICH with IVH. There are various densities of blood within the hematoma cavity as well as perihematomal edema. There is no sign of active extravasation. There are no obvious vessel abnormalities. There is approximately 3cm MLS.       Post-Op Info:  * No surgery found *         Interval History: 10/15: STEFANI PRINGLE, Interval extubation with improving exam. MRI w/ significant amyloid as likely underlying disease process.    Medications:  Continuous Infusions:   niCARdipine 5 mg/hr (10/15/23 0901)     Scheduled Meds:   memantine  5 mg Oral BID    ondansetron  4 mg Intravenous Once    QUEtiapine  25 mg Oral QHS    senna-docusate 8.6-50 mg  1 tablet Per OG tube BID     PRN Meds:acetaminophen, dextrose 10%, dextrose 10%, glucagon (human recombinant), hydrALAZINE, insulin aspart U-100, labetalol, magnesium oxide, magnesium oxide, ondansetron, potassium bicarbonate, potassium bicarbonate, potassium bicarbonate,  potassium, sodium phosphates, potassium, sodium phosphates, potassium, sodium phosphates, sodium chloride 0.9%     Review of Systems  Objective:     Weight: 64 kg (141 lb)  Body mass index is 23.46 kg/m².  Vital Signs (Most Recent):  Temp: 98.5 °F (36.9 °C) (10/15/23 0701)  Pulse: 71 (10/15/23 0915)  Resp: 16 (10/15/23 0915)  BP: (!) 115/56 (10/15/23 0915)  SpO2: 97 % (10/15/23 0915) Vital Signs (24h Range):  Temp:  [98 °F (36.7 °C)-98.5 °F (36.9 °C)] 98.5 °F (36.9 °C)  Pulse:  [55-80] 71  Resp:  [15-27] 16  SpO2:  [94 %-100 %] 97 %  BP: (115-157)/() 115/56          Oxygen Concentration (%):  [40] 40  Resp Rate Total:  [12 br/min-23 br/min] 17 br/min             NG/OG Tube 10/13/23 Center mouth (Active)   Placement Check placement verified by x-ray;placement verified by distal tube length measurement 10/14/23 0505   Tolerance no signs/symptoms of discomfort 10/14/23 1101   Securement secured to commercial device 10/14/23 1101   Clamp Status/Tolerance clamped;no restlessness;no nausea;no emesis;no abdominal distention;no abdominal discomfort;no residual 10/14/23 1101   Suction Setting/Drainage Method suction at the bedside 10/14/23 1101   Insertion Site Appearance no redness, warmth, tenderness, skin breakdown, drainage 10/14/23 1101   Drainage None 10/14/23 0505   Current Rate (mL/hr) 0 mL/hr 10/14/23 0148         Physical Exam   Neurosurgery Physical Exam  E4V4M6, AO x 1 (self); higher order confusion;  CNi except R homonomous hemianopsia;  FCx 4 ag, no drift, SILT       Significant Labs:  Recent Labs   Lab 10/13/23  2210 10/14/23  0404 10/15/23  0241   * 129* 127*    140 139   K 4.1 4.8 3.9    108 109   CO2 24 21* 20*   BUN 21* 19 17   CREATININE 0.91 0.9 0.8   CALCIUM 10.2 8.5* 9.2   MG  --  1.9 1.7       Recent Labs   Lab 10/13/23  2210 10/14/23  0404 10/15/23  0241   WBC 17.10* 13.28* 11.89   HGB 15.4 13.8* 13.4*   HCT 46.0 40.7 39.7*    266 237       Recent Labs   Lab  "10/13/23  2229 10/14/23  0404   LABPT 12.7  --    INR 0.9 1.0   APTT 24.4* 22.8       Microbiology Results (last 7 days)       Procedure Component Value Units Date/Time    Urine culture [[id number]] Collected: 10/14/23 1740    Order Status: No result Specimen: Urine Updated: 10/14/23 1804          ABGs:   Recent Labs   Lab 10/14/23  0601   PH 7.349*   PCO2 48.5*   PO2 132*   HCO3 26.7   POCSATURATED 99   BE 1       Cardiac markers:   Recent Labs   Lab 10/13/23  2210   TROPONINI <0.012       CMP:   Recent Labs   Lab 10/13/23  2210 10/14/23  0404 10/15/23  0241   * 129* 127*   CALCIUM 10.2 8.5* 9.2   ALBUMIN 4.7 3.4* 3.4*   PROT 8.2 6.6 6.5    140 139   K 4.1 4.8 3.9   CO2 24 21* 20*    108 109   BUN 21* 19 17   CREATININE 0.91 0.9 0.8   ALKPHOS 89 68 75   ALT 22 10 9*   AST 35 28 18   BILITOT 1.4* 1.0 1.2*       CRP: No results for input(s): "CRP" in the last 48 hours.  ESR: No results for input(s): "POCESR", "ERYTHROCYTES" in the last 48 hours.  LFTs:   Recent Labs   Lab 10/13/23  2210 10/14/23  0404 10/15/23  0241   ALT 22 10 9*   AST 35 28 18   ALKPHOS 89 68 75   BILITOT 1.4* 1.0 1.2*   PROT 8.2 6.6 6.5   ALBUMIN 4.7 3.4* 3.4*       Procalcitonin: No results for input(s): "PROCAL" in the last 48 hours.    Significant Diagnostics:  I have reviewed all pertinent imaging results/findings within the past 24 hours.    Assessment/Plan:     * Nontraumatic cortical hemorrhage of left cerebral hemisphere  73M h/o vascular dementia presents as transfer from [facility] with a chief complaint of confusion and difficulty seeing, and HA. CTA on presentation demonstrates a grossly stable 4.5x4.3x3.0cm L parieto-occipital ICH with IVH (ICHS 2). There are various densities of blood within the hematoma cavity as well as perihematomal edema. There is no sign of active extravasation. There are no obvious vessel abnormalities. There is approximately 3cm MLS.     --Patient admitted to ICU on " telemetry      -q1h neurochecks in ICU  --All labs and diagnostics reviewed      -CTH/CTA 10/14: grossly stable 4.5x4.3x3.0cm L parieto-occipital ICH w/ IVH; No underlying vascular lesion      -MRI w/wo 10/14: Significant amyloid as likely etiology of bleed, stable ICH  --Hold therapeutic anti-plt/coag medications  --SBP <140  --Na >135  --AED per NCC  --HOB >30  --ADAT per NCC  --Stable for SQH per primary team   --No NSGY intervention at this time; NSGY will sign-off at this time  --Continued work-up and management per NCC/Stroke teams    Dispo: Per NCC/Stroke teams          Sylvester Rueda MD  Neurosurgery  [facility]

## 2023-10-15 NOTE — PLAN OF CARE
Problem: SLP  Goal: SLP Goal  Description: Speech Language Pathology Goals  Goals expected to be met by 10/29    1. Pt will tolerate a regular sold and thin liquids diet without any overt s/sx of airway compromise.   2. Pt will participate in cognitive-linguistic assessment to determine additional therapeutic needs.       Outcome: Ongoing, Progressing

## 2023-10-15 NOTE — SUBJECTIVE & OBJECTIVE
Interval History: 10/15: NAEON, AFVSS, Interval extubation with improving exam. MRI w/ significant amyloid as likely underlying disease process.    Medications:  Continuous Infusions:   niCARdipine 5 mg/hr (10/15/23 0901)     Scheduled Meds:   memantine  5 mg Oral BID    ondansetron  4 mg Intravenous Once    QUEtiapine  25 mg Oral QHS    senna-docusate 8.6-50 mg  1 tablet Per OG tube BID     PRN Meds:acetaminophen, dextrose 10%, dextrose 10%, glucagon (human recombinant), hydrALAZINE, insulin aspart U-100, labetalol, magnesium oxide, magnesium oxide, ondansetron, potassium bicarbonate, potassium bicarbonate, potassium bicarbonate, potassium, sodium phosphates, potassium, sodium phosphates, potassium, sodium phosphates, sodium chloride 0.9%     Review of Systems  Objective:     Weight: 64 kg (141 lb)  Body mass index is 23.46 kg/m².  Vital Signs (Most Recent):  Temp: 98.5 °F (36.9 °C) (10/15/23 0701)  Pulse: 71 (10/15/23 0915)  Resp: 16 (10/15/23 0915)  BP: (!) 115/56 (10/15/23 0915)  SpO2: 97 % (10/15/23 0915) Vital Signs (24h Range):  Temp:  [98 °F (36.7 °C)-98.5 °F (36.9 °C)] 98.5 °F (36.9 °C)  Pulse:  [55-80] 71  Resp:  [15-27] 16  SpO2:  [94 %-100 %] 97 %  BP: (115-157)/() 115/56          Oxygen Concentration (%):  [40] 40  Resp Rate Total:  [12 br/min-23 br/min] 17 br/min             NG/OG Tube 10/13/23 Center mouth (Active)   Placement Check placement verified by x-ray;placement verified by distal tube length measurement 10/14/23 1898   Tolerance no signs/symptoms of discomfort 10/14/23 1101   Securement secured to commercial device 10/14/23 1101   Clamp Status/Tolerance clamped;no restlessness;no nausea;no emesis;no abdominal distention;no abdominal discomfort;no residual 10/14/23 1101   Suction Setting/Drainage Method suction at the bedside 10/14/23 1101   Insertion Site Appearance no redness, warmth, tenderness, skin breakdown, drainage 10/14/23 1101   Drainage None 10/14/23 5817   Current Rate  "(mL/hr) 0 mL/hr 10/14/23 0148          Physical Exam   Neurosurgery Physical Exam  E4V4M6, AO x 1 (self); higher order confusion;  CNi except R homonomous hemianopsia;  FCx 4 ag, no drift, SILT       Significant Labs:  Recent Labs   Lab 10/13/23  2210 10/14/23  0404 10/15/23  0241   * 129* 127*    140 139   K 4.1 4.8 3.9    108 109   CO2 24 21* 20*   BUN 21* 19 17   CREATININE 0.91 0.9 0.8   CALCIUM 10.2 8.5* 9.2   MG  --  1.9 1.7       Recent Labs   Lab 10/13/23  2210 10/14/23  0404 10/15/23  0241   WBC 17.10* 13.28* 11.89   HGB 15.4 13.8* 13.4*   HCT 46.0 40.7 39.7*    266 237       Recent Labs   Lab 10/13/23  2229 10/14/23  0404   LABPT 12.7  --    INR 0.9 1.0   APTT 24.4* 22.8       Microbiology Results (last 7 days)       Procedure Component Value Units Date/Time    Urine culture [[id number]] Collected: 10/14/23 1740    Order Status: No result Specimen: Urine Updated: 10/14/23 1804          ABGs:   Recent Labs   Lab 10/14/23  0601   PH 7.349*   PCO2 48.5*   PO2 132*   HCO3 26.7   POCSATURATED 99   BE 1       Cardiac markers:   Recent Labs   Lab 10/13/23  2210   TROPONINI <0.012       CMP:   Recent Labs   Lab 10/13/23  2210 10/14/23  0404 10/15/23  0241   * 129* 127*   CALCIUM 10.2 8.5* 9.2   ALBUMIN 4.7 3.4* 3.4*   PROT 8.2 6.6 6.5    140 139   K 4.1 4.8 3.9   CO2 24 21* 20*    108 109   BUN 21* 19 17   CREATININE 0.91 0.9 0.8   ALKPHOS 89 68 75   ALT 22 10 9*   AST 35 28 18   BILITOT 1.4* 1.0 1.2*       CRP: No results for input(s): "CRP" in the last 48 hours.  ESR: No results for input(s): "POCESR", "ERYTHROCYTES" in the last 48 hours.  LFTs:   Recent Labs   Lab 10/13/23  2210 10/14/23  0404 10/15/23  0241   ALT 22 10 9*   AST 35 28 18   ALKPHOS 89 68 75   BILITOT 1.4* 1.0 1.2*   PROT 8.2 6.6 6.5   ALBUMIN 4.7 3.4* 3.4*       Procalcitonin: No results for input(s): "PROCAL" in the last 48 hours.    Significant Diagnostics:  I have reviewed all pertinent imaging " results/findings within the past 24 hours.

## 2023-10-15 NOTE — PROGRESS NOTES
[facility]  Neurocritical Care  Progress Note    Admit Date: 10/14/2023  Service Date: 10/15/2023  Length of Stay: 1    Subjective:     Chief Complaint: Nontraumatic cortical hemorrhage of left cerebral hemisphere    History of Present Illness: 72 y/o M with PMH of vascular dementia who presented to ED of [facility] with c/o confusion and difficulty seeing. Wife reports pt was in his usual state of health when he left the house at 3am to go cut grass. He returned home around 5pm, when his wife noticed he was having difficulty turning off his truck. He also reported visual changes and was unable to see any pictures on the wall. In the ED, he c/o generalized weakness and an intermittent L-sided headache x 1 week. Denied any falls or trauma to the area. He initially presented with GCS 15 and no focal deficits. CTH showed large posterior parieto-occipital intracerebral hemorrhage with 5mm shift. He was given Keppra and started on Cardene for BP control. GCS declined from 15 to 13 and pt began projectile vomiting. He was subsequently intubated. Transferred to [facility] for higher level of care and will be admitted to [facility] for further eval and management.       Hospital Course: 10/14/2023 MRI yesterday suggestive of CAA. EKG with bifasicular block. Pt's wife denies any known cardiac history. Spoke with Dr. Gillies in cardiology, low level of concern considering EKG is unchanged from prior. Extubated to room air.  10/15/2023 Pt with nausea/vomiting and restlessness overnight and this AM. SBP elevated around periods of n/v requiring low dose cardene. Holding off on starting oral antihypertensives as pt was not requiring cardene yesterday. CTH stable IPH and SDH, stable MLS. Started seroquel 25 qHS. Passed SLP, started diet.      Interval History:  Pt with nausea/vomiting and restlessness overnight and this AM. SBP elevated around periods of n/v requiring low dose cardene. Holding off on starting oral antihypertensives  as pt was not requiring cardene yesterday. CTH stable. Started seroquel 25 qHS. Passed SLP, started diet.    Review of Systems   Unable to perform ROS: Dementia   Constitutional:  Negative for fever.   HENT:  Negative for trouble swallowing.    Respiratory:  Negative for shortness of breath.    Cardiovascular:  Negative for chest pain, palpitations and leg swelling.   Gastrointestinal:  Negative for abdominal pain.   Genitourinary:  Negative for difficulty urinating.   Musculoskeletal:  Positive for gait problem.   Neurological:  Positive for headaches. Negative for weakness.   Psychiatric/Behavioral:  Positive for confusion and decreased concentration.        Objective:     Vitals:  Temp: 98 °F (36.7 °C)  Pulse: 61  Rhythm: normal sinus rhythm  BP: 135/63  MAP (mmHg): 90  Resp: 16  SpO2: 97 %    Temp  Min: 98 °F (36.7 °C)  Max: 98.5 °F (36.9 °C)  Pulse  Min: 55  Max: 80  BP  Min: 115/56  Max: 156/72  MAP (mmHg)  Min: 81  Max: 103  Resp  Min: 15  Max: 27  SpO2  Min: 93 %  Max: 100 %    10/14 0701 - 10/15 0700  In: 114.3 [I.V.:114.3]  Out: 290 [Urine:290]   Unmeasured Output  Stool Occurrence: 0        Physical Exam  Vitals and nursing note reviewed.   Constitutional:       General: He is not in acute distress.     Appearance: He is normal weight. He is not diaphoretic.   HENT:      Head: Normocephalic and atraumatic.      Right Ear: External ear normal.      Left Ear: External ear normal.      Nose: Nose normal.      Mouth/Throat:      Mouth: Mucous membranes are moist.      Pharynx: Oropharynx is clear.   Eyes:      Pupils: Pupils are equal, round, and reactive to light.   Cardiovascular:      Rate and Rhythm: Normal rate and regular rhythm.      Heart sounds: Normal heart sounds. No murmur heard.  Pulmonary:      Effort: Pulmonary effort is normal. No respiratory distress.      Breath sounds: Normal breath sounds.   Abdominal:      General: There is no distension.      Palpations: Abdomen is soft.      Tenderness:  There is no abdominal tenderness. There is no guarding.   Musculoskeletal:      Cervical back: No rigidity.      Right lower leg: No edema.      Left lower leg: No edema.   Skin:     General: Skin is warm and dry.      Capillary Refill: Capillary refill takes less than 2 seconds.   Neurological:      Comments: E3 V4 M6  Drowsy. Oriented x self only. Expressive aphasia. Following commands with repeated encouragement. Emotionally labile.   CN II-XII grossly intact, specifically:  PERRLA. EOMI.   Difficult to assess 2/2 pt understanding but appears to have R sided visual deficits   No facial asymmetry. Facial sensation intact in V1-V3.  Tongue midline.   Shoulder shrug symmetric.  + Cough  Motor:  RUE 5/5  RLE 5/5  LUE 5/5  LLE 5/5  Sensation intact and symmetric throughout          Unable to test memory, judgment, insight, fund of knowledge, coordination, gait due to level of consciousness.       Medications:  ContinuousniCARdipine, Last Rate: 2.5 mg/hr (10/15/23 1401)    Scheduledmagnesium sulfate IVPB, 2 g, Once  memantine, 5 mg, BID  QUEtiapine, 25 mg, QHS  senna-docusate 8.6-50 mg, 1 tablet, BID  sodium chloride 0.9%, 500 mL, Once    PRNacetaminophen, 650 mg, Q6H PRN  dextrose 10%, 12.5 g, PRN  dextrose 10%, 25 g, PRN  glucagon (human recombinant), 1 mg, PRN  hydrALAZINE, 10 mg, Q4H PRN  insulin aspart U-100, 0-5 Units, Q6H PRN  labetalol, 10 mg, Q4H PRN  magnesium oxide, 800 mg, PRN  magnesium oxide, 800 mg, PRN  ondansetron, 4 mg, Q8H PRN  oxyCODONE, 5 mg, Q6H PRN  potassium bicarbonate, 35 mEq, PRN  potassium bicarbonate, 50 mEq, PRN  potassium bicarbonate, 60 mEq, PRN  potassium, sodium phosphates, 2 packet, PRN  potassium, sodium phosphates, 2 packet, PRN  potassium, sodium phosphates, 2 packet, PRN  sodium chloride 0.9%, 10 mL, PRN      Today I personally reviewed pertinent medications, lines/drains/airways, imaging, cardiology results, laboratory results, microbiology results, notably:    CTH   Stable  intracranial hemorrhage (intraparenchymal intraventricular and subdural) and stable mild midline shift.  No hydrocephalus.    Diet  Diet Adult Regular (IDDSI Level 7) Thin  Diet Adult Regular (IDDSI Level 7) Thin          Assessment/Plan:     Neuro  * Nontraumatic cortical hemorrhage of left cerebral hemisphere  74 y/o M with PMH of vascular dementia presents with L ICH with IVH from OSH. Denies home anticoagulation use and recent falls/trauma. No seizure activity noted. Repeat CTA with no interval changes, no active bleeding and no evident vessel abnormalities.     E2 V1T M5  PERRL  ICH 2    -Admit to NCC  -VS/neuro checks q1h  -NSGY consulted  -Vascular neurology consulted  -SBP goal < 140  -PRN labetalol/hydralazine/cardene  -Na+ > 135  -Seizure precautions  -Accuchecks q6h w/ low dose SSI for glycemic control  -MRI brain w/ and w/o contrast  -10/15 CTH stable IPH and SDH, stable MLS  - Passed SLP, started regular diet    Antithrombotics for secondary stroke prevention: Antiplatelets: None: Intracerebral Hemorrhage    Statins for secondary stroke prevention and hyperlipidemia, if present:   Statins: None: Reason: ICH    Aggressive risk factor modification: None     Rehab efforts: The patient has been evaluated by a stroke team provider and the therapy needs have been fully considered based off the presenting complaints and exam findings. The following therapy evaluations are needed: PT evaluate and treat, OT evaluate and treat, SLP evaluate and treat, PM&R evaluate for appropriate placement    Diagnostics ordered/pending: CTA Head to assess vasculature , HgbA1C to assess blood glucose levels, Lipid Profile to assess cholesterol levels, MRI head without contrast to assess brain parenchyma, TTE to assess cardiac function/status , TSH to assess thyroid function    VTE prophylaxis: None: Reason for No Pharmacological VTE Prophylaxis: History of systemic or intracranial bleeding    BP parameters: ICH: SBP  <140        Midline shift of brain  -Neuro checks q1h     Vasogenic brain edema  -Neuro checks q1h  -NSGY consulted, signed off  -Na+ > 135  -Accuchecks q6h w/ low dose SSI for glycemic control  -MRI brain w/ and w/o contrast similar appearance of left temporal occipital intraparenchymal hemorrhage, left subdural hemorrhage, and intraventricular extension. No underlying enhancing intracranial lesion. 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    Vascular dementia  -Continue home Namenda  - Seroquel 25 qHS    Palliative Care  Endotracheally intubated  Intubated at OSH due to GCS decline    -Daily ABGs  -Daily CXR  -10/14 Extubated to room air         The patient is being Prophylaxed for:  Venous Thromboembolism with: Mechanical  Stress Ulcer with: Not Applicable   Ventilator Pneumonia with: not applicable    Activity Orders          Diet Adult Regular (IDDSI Level 7) Thin: Regular starting at 10/15 0856    Progressive Mobility Protocol (mobilize patient to their highest level of functioning at least twice daily) starting at 10/14 0800    Turn patient starting at 10/14 0400    Elevate HOB starting at 10/14 0206        Full Code     Critical care time spent on the evaluation and treatment of severe organ dysfunction, review of pertinent labs and imaging studies, discussions with consulting providers and discussions with patient/family: 45 minutes.    Dakota Del Castillo PA-C  Neurocritical Care  [facility]

## 2023-10-15 NOTE — ASSESSMENT & PLAN NOTE
73[place] with confusion and difficulty seeing around 5a.m. on 10/13. CTH left parietooccipital hemorrhage and left subdural hematoma, 3mm R MLS.CTA no LVO. MRI appears to show temporal/occipital hemorrhagic conversion within MCA distribution..     Recommendations:    - No statins or antithrombotics in setting of hemorrhage  - For headache give IV fluids, Mg, antiemetics, and acetaminophen (can consider gabapentin)  - Risk factors include: hypertension  - PT/OT/SLP/PM&R  - Mechanical SCD VTE prophylaxis  - SBP< 140

## 2023-10-15 NOTE — PT/OT/SLP PROGRESS
Physical Therapy      Patient Name:  Kobi Pittman   MRN:  [id number]    Patient not seen today secondary to pt NATHANIEL for CT scan. Will follow-up tomorrow, 10/16.

## 2023-10-15 NOTE — SUBJECTIVE & OBJECTIVE
Principal Problem: Nontraumatic cortical hemorrhage of the left cerebral hemisphere    Subjective:     Interval History: S/p extubation. GCS 14. NIHSS 5. Patient given Mg and antiemetics for headache. No overnight events.     HPI, Past Medical, Family, and Social History remains the same as documented in the initial encounter.     Review of Systems  Scheduled Meds:   magnesium sulfate IVPB  2 g Intravenous Once    memantine  5 mg Oral BID    QUEtiapine  25 mg Oral QHS    senna-docusate 8.6-50 mg  1 tablet Per OG tube BID     Continuous Infusions:   niCARdipine 2.5 mg/hr (10/15/23 1401)     PRN Meds:acetaminophen, dextrose 10%, dextrose 10%, glucagon (human recombinant), hydrALAZINE, insulin aspart U-100, labetalol, magnesium oxide, magnesium oxide, ondansetron, oxyCODONE, potassium bicarbonate, potassium bicarbonate, potassium bicarbonate, potassium, sodium phosphates, potassium, sodium phosphates, potassium, sodium phosphates, sodium chloride 0.9%    Objective:     Vital Signs (Most Recent):  Temp: 98 °F (36.7 °C) (10/15/23 1115)  Pulse: 61 (10/15/23 1415)  Resp: 16 (10/15/23 1415)  BP: 135/63 (10/15/23 1415)  SpO2: 97 % (10/15/23 1415)  BP Location: Left arm    Vital Signs Range (Last 24H):  Temp:  [98 °F (36.7 °C)-98.5 °F (36.9 °C)]   Pulse:  [57-80]   Resp:  [15-27]   BP: (115-156)/(56-72)   SpO2:  [93 %-100 %]   BP Location: Left arm       Physical Exam         Neurological Exam:   LOC: alert  Attention Span: poor  Language: Expressive aphasia, Naming impaired  Articulation: No dysarthria  Orientation: Not oriented to place, Not oriented to time  Visual Fields: Hemianopsia right  EOM (CN III, IV, VI): Full/intact  Pupils (CN II, III): PERRL  Facial Movement (CN VII): Symmetric facial expression    Sensation: Intact to light touch, temperature and vibration    Laboratory:  CMP:   Recent Labs   Lab 10/15/23  0241   CALCIUM 9.2   ALBUMIN 3.4*   PROT 6.5      K 3.9   CO2 20*      BUN 17   CREATININE 0.8    ALKPHOS 75   ALT 9*   AST 18   BILITOT 1.2*     CBC:   Recent Labs   Lab 10/15/23  0241   WBC 11.89   RBC 4.23*   HGB 13.4*   HCT 39.7*      MCV 94   MCH 31.7*   MCHC 33.8       Diagnostic Results     Brain Imaging   CT H parieto occipital hemorrhage     Vessel Imaging   CTA no LVO    Cardiac Imaging   EF 65%

## 2023-10-15 NOTE — PROGRESS NOTES
[facility] - Neuro Critical Care  Vascular Neurology  Comprehensive Stroke Center  Progress Note    Assessment/Plan:     * Nontraumatic cortical hemorrhage of left cerebral hemisphere  73M transfer from [facility] with confusion and difficulty seeing around 5a.m. on 10/13. CTH left parietooccipital hemorrhage and left subdural hematoma, 3mm R MLS.CTA no LVO. MRI appears to show temporal/occipital hemorrhagic conversion within MCA distribution..     Recommendations:    - No statins or antithrombotics in setting of hemorrhage  - For headache give IV fluids, Mg, antiemetics, and acetaminophen (can consider gabapentin)  - Risk factors include: hypertension  - PT/OT/SLP/PM&R  - Mechanical SCD VTE prophylaxis  - SBP< 140        Midline shift of brain  3 mm R MLS on imaging. Neuro exam stable without interval changes. Continue with q1h checks.     Vasogenic brain edema  Vasogenic edema in the setting of hemorrhage. The patient will need frequent neuro checks while admitted in the neuro ICU to assess for interval changes.          No notes on file    STROKE DOCUMENTATION        NIH Scale:  1a. Level of Consciousness: 0-->Alert, keenly responsive  1b. LOC Questions: 1-->Answers one question correctly  1c. LOC Commands: 0-->Performs both tasks correctly  2. Best Gaze: 0-->Normal  3. Visual: 2-->Complete hemianopia  4. Facial Palsy: 0-->Normal symmetrical movements  5a. Motor Arm, Left: 0-->No drift, limb holds 90 (or 45) degrees for full 10 secs  5b. Motor Arm, Right: 0-->No drift, limb holds 90 (or 45) degrees for full 10 secs  6a. Motor Leg, Left: 0-->No drift, leg holds 30 degree position for full 5 secs  6b. Motor Leg, Right: 0-->No drift, leg holds 30 degree position for full 5 secs  7. Limb Ataxia: 0-->Absent  8. Sensory: 0-->Normal, no sensory loss  9. Best Language: 1-->Mild-to-moderate aphasia, some obvious loss of fluency or facility of comprehension, without significant limitation on ideas  expressed or form of expression. Reduction of speech and/or comprehension, however, makes conversation. . . (see row details)  10. Dysarthria: 0-->Normal  11. Extinction and Inattention (formerly Neglect): 1-->Visual, tactile, auditory, spatial, or personal inattention or extinction to bilateral simultaneous stimulation in one of the sensory modalities  Total (NIH Stroke Scale): 5       Modified [place] Score: 3  [place] Coma Scale:14   ABCD2 Score:    [id number] Score:   HAS -BLED Score:   ICH Score:2  Hunt & Reese Classification:      Hemorrhagic change of an Ischemic Stroke: Does this patient have an ischemic stroke with hemorrhagic changes? Yes, Grading Scale: PH Type 1 (PH-1) = hematoma in < 30% of the infarcted area with some slight space-occupying effect. Is this a symptomatic change?  No - Hemorrhage is not clinically significant     Principal Problem: Nontraumatic cortical hemorrhage of the left cerebral hemisphere    Subjective:     Interval History: S/p extubation. GCS 14. NIHSS 5. Patient given Mg and antiemetics for headache. No overnight events.     HPI, Past Medical, Family, and Social History remains the same as documented in the initial encounter.     Review of Systems  Scheduled Meds:   magnesium sulfate IVPB  2 g Intravenous Once    memantine  5 mg Oral BID    QUEtiapine  25 mg Oral QHS    senna-docusate 8.6-50 mg  1 tablet Per OG tube BID     Continuous Infusions:   niCARdipine 2.5 mg/hr (10/15/23 1401)     PRN Meds:acetaminophen, dextrose 10%, dextrose 10%, glucagon (human recombinant), hydrALAZINE, insulin aspart U-100, labetalol, magnesium oxide, magnesium oxide, ondansetron, oxyCODONE, potassium bicarbonate, potassium bicarbonate, potassium bicarbonate, potassium, sodium phosphates, potassium, sodium phosphates, potassium, sodium phosphates, sodium chloride 0.9%    Objective:     Vital Signs (Most Recent):  Temp: 98 °F (36.7 °C) (10/15/23 1115)  Pulse: 61 (10/15/23 1415)  Resp: 16  (10/15/23 1415)  BP: 135/63 (10/15/23 1415)  SpO2: 97 % (10/15/23 1415)  BP Location: Left arm    Vital Signs Range (Last 24H):  Temp:  [98 °F (36.7 °C)-98.5 °F (36.9 °C)]   Pulse:  [57-80]   Resp:  [15-27]   BP: (115-156)/(56-72)   SpO2:  [93 %-100 %]   BP Location: Left arm       Physical Exam         Neurological Exam:   LOC: alert  Attention Span: poor  Language: Expressive aphasia, Naming impaired  Articulation: No dysarthria  Orientation: Not oriented to place, Not oriented to time  Visual Fields: Hemianopsia right  EOM (CN III, IV, VI): Full/intact  Pupils (CN II, III): PERRL  Facial Movement (CN VII): Symmetric facial expression    Sensation: Intact to light touch, temperature and vibration    Laboratory:  CMP:   Recent Labs   Lab 10/15/23  0241   CALCIUM 9.2   ALBUMIN 3.4*   PROT 6.5      K 3.9   CO2 20*      BUN 17   CREATININE 0.8   ALKPHOS 75   ALT 9*   AST 18   BILITOT 1.2*     CBC:   Recent Labs   Lab 10/15/23  0241   WBC 11.89   RBC 4.23*   HGB 13.4*   HCT 39.7*      MCV 94   MCH 31.7*   MCHC 33.8       Diagnostic Results     Brain Imaging   CT H parieto occipital hemorrhage     Vessel Imaging   CTA no LVO    Cardiac Imaging   EF 65%      Quinn Nixon MD  [facility]  Department of Vascular Neurology   [facility] - Neuro Critical Care

## 2023-10-15 NOTE — ASSESSMENT & PLAN NOTE
72 y/o M with PMH of vascular dementia presents with L ICH with IVH from OSH. Denies home anticoagulation use and recent falls/trauma. No seizure activity noted. Repeat CTA with no interval changes, no active bleeding and no evident vessel abnormalities.     E2 V1T M5  PERRL  ICH 2    -Admit to NCC  -VS/neuro checks q1h  -NSGY consulted  -Vascular neurology consulted  -SBP goal < 140  -PRN labetalol/hydralazine/cardene  -Na+ > 135  -Seizure precautions  -Accuchecks q6h w/ low dose SSI for glycemic control  -MRI brain w/ and w/o contrast  -10/15 CTH stable IPH and SDH, stable MLS  - Passed SLP, started regular diet    Antithrombotics for secondary stroke prevention: Antiplatelets: None: Intracerebral Hemorrhage    Statins for secondary stroke prevention and hyperlipidemia, if present:   Statins: None: Reason: ICH    Aggressive risk factor modification: None     Rehab efforts: The patient has been evaluated by a stroke team provider and the therapy needs have been fully considered based off the presenting complaints and exam findings. The following therapy evaluations are needed: PT evaluate and treat, OT evaluate and treat, SLP evaluate and treat, PM&R evaluate for appropriate placement    Diagnostics ordered/pending: CTA Head to assess vasculature , HgbA1C to assess blood glucose levels, Lipid Profile to assess cholesterol levels, MRI head without contrast to assess brain parenchyma, TTE to assess cardiac function/status , TSH to assess thyroid function    VTE prophylaxis: None: Reason for No Pharmacological VTE Prophylaxis: History of systemic or intracranial bleeding    BP parameters: ICH: SBP <140

## 2023-10-15 NOTE — ASSESSMENT & PLAN NOTE
Vasogenic edema in the setting of hemorrhage. The patient will need frequent neuro checks while admitted in the neuro ICU to assess for interval changes.

## 2023-10-15 NOTE — PT/OT/SLP EVAL
"Speech Language Pathology Evaluation  Bedside Swallow    Patient Name:  Kobi Pittman   MRN:  [id number]  Admitting Diagnosis: Nontraumatic cortical hemorrhage of left cerebral hemisphere    Recommendations:                 General Recommendations:  Cognitive-linguistic evaluation and monitor diet tolerance   Diet recommendations:  Regular Diet - IDDSI Level 7, Thin liquids - IDDSI Level 0   Aspiration Precautions: 1 bite/sip at a time, HOB to 90 degrees, Remain upright 30 minutes post meal, Small bites/sips, and Standard aspiration precautions   General Precautions: Standard,    Communication strategies:  go to room if call light pushed    Assessment:     Kobi Pittman is a 73 y.o. male with an SLP diagnosis of  largely functional swallow .  He presents with emesis this date prior to session. SLP will continue to follow.     History:     Past Medical History:   Diagnosis Date    Acid reflux     Cataract     Kidney stone      Past Surgical History:   Procedure Laterality Date    COLONOSCOPY       Chief Complaint: Nontraumatic cortical hemorrhage of left cerebral hemisphere     History of Present Illness: "74 y/o M with PMH of vascular dementia who presented to ED of [facility] with c/o confusion and difficulty seeing. Wife reports pt was in his usual state of health when he left the house at 3am to go cut grass. He returned home around 5pm, when his wife noticed he was having difficulty turning off his truck. He also reported visual changes and was unable to see any pictures on the wall. In the ED, he c/o generalized weakness and an intermittent L-sided headache x 1 week. Denied any falls or trauma to the area. He initially presented with GCS 15 and no focal deficits. CTH showed large posterior parieto-occipital intracerebral hemorrhage with 5mm shift. He was given Keppra and started on Cardene for BP control. GCS declined from 15 to 13 and pt began projectile vomiting. He was subsequently intubated. Transferred to [facility] [place] [facility] for " "higher level of care and will be admitted to [facility] for further eval and management. "    Prior Intubation HX:  10/13-10/14    Modified Barium Swallow: none    Chest X-Rays: 10/14:"Stable chest"    Prior diet: regular/thin    Subjective     Session cleared with RN. RN [present in room cleaning pt up s/p emesis. Pt agreeable to ST.      " I feel better, a little hungry."    Pain/Comfort:  Pain Rating 1: 0/10    Respiratory Status: Nasal cannula, flow 1 L/min    Objective:     Oral Musculature Evaluation  Oral Musculature: WFL  Dentition: present and adequate  Secretion Management: adequate  Mucosal Quality: good  Mandibular Strength and Mobility: WFL  Oral Labial Strength and Mobility: WFL  Lingual Strength and Mobility: WFL  Volitional Cough: present  Volitional Swallow: timely  Voice Prior to PO Intake: clear    Bedside Swallow Eval:   Consistencies Assessed:  Thin liquids 8 oz via straw sips  Solids cracker x1      Oral Phase:   WFL    Pharyngeal Phase:   no overt clinical signs/symptoms of aspiration  no overt clinical signs/symptoms of pharyngeal dysphagia    Compensatory Strategies  None    Treatment: HOB raised. Fed assist provided. Pt able to follow commands and answer Y/N questions adequately. Pt tolerated all consistencies. Recommend a regular diet/ thin liquids. Education provided re: role of SLP, diet recs, swallow precs, s/s aspiration and POC.  Pt verbalized understanding and agreement.     Goals:   Multidisciplinary Problems       SLP Goals          Problem: SLP    Goal Priority Disciplines Outcome   SLP Goal     SLP Ongoing, Progressing   Description: Speech Language Pathology Goals  Goals expected to be met by 10/29    1. Pt will tolerate a regular sold and thin liquids diet without any overt s/sx of airway compromise.   2. Pt will participate in cognitive-linguistic assessment to determine additional therapeutic needs.                            Plan:     Patient to be seen:  4 x/week   Plan of Care " expires:  11/14/23  Plan of Care reviewed with:  patient, spouse   SLP Follow-Up:  Yes       Discharge recommendations:    tbd  Barriers to Discharge:  Level of Skilled Assistance Needed      Time Tracking:     SLP Treatment Date:   10/15/23  Speech Start Time:  0756  Speech Stop Time:  0805     Speech Total Time (min):  9 min    Billable Minutes: Eval Swallow and Oral Function 9    10/15/2023

## 2023-10-15 NOTE — SUBJECTIVE & OBJECTIVE
Interval History:  Pt with nausea/vomiting and restlessness overnight and this AM. SBP elevated around periods of n/v requiring low dose cardene. Holding off on starting oral antihypertensives as pt was not requiring cardene yesterday. CTH stable. Started seroquel 25 qHS. Passed SLP, started diet.    Review of Systems   Unable to perform ROS: Dementia   Constitutional:  Negative for fever.   HENT:  Negative for trouble swallowing.    Respiratory:  Negative for shortness of breath.    Cardiovascular:  Negative for chest pain, palpitations and leg swelling.   Gastrointestinal:  Negative for abdominal pain.   Genitourinary:  Negative for difficulty urinating.   Musculoskeletal:  Positive for gait problem.   Neurological:  Positive for headaches. Negative for weakness.   Psychiatric/Behavioral:  Positive for confusion and decreased concentration.        Objective:     Vitals:  Temp: 98 °F (36.7 °C)  Pulse: 61  Rhythm: normal sinus rhythm  BP: 135/63  MAP (mmHg): 90  Resp: 16  SpO2: 97 %    Temp  Min: 98 °F (36.7 °C)  Max: 98.5 °F (36.9 °C)  Pulse  Min: 55  Max: 80  BP  Min: 115/56  Max: 156/72  MAP (mmHg)  Min: 81  Max: 103  Resp  Min: 15  Max: 27  SpO2  Min: 93 %  Max: 100 %    10/14 0701 - 10/15 0700  In: 114.3 [I.V.:114.3]  Out: 290 [Urine:290]   Unmeasured Output  Stool Occurrence: 0        Physical Exam  Vitals and nursing note reviewed.   Constitutional:       General: He is not in acute distress.     Appearance: He is normal weight. He is not diaphoretic.   HENT:      Head: Normocephalic and atraumatic.      Right Ear: External ear normal.      Left Ear: External ear normal.      Nose: Nose normal.      Mouth/Throat:      Mouth: Mucous membranes are moist.      Pharynx: Oropharynx is clear.   Eyes:      Pupils: Pupils are equal, round, and reactive to light.   Cardiovascular:      Rate and Rhythm: Normal rate and regular rhythm.      Heart sounds: Normal heart sounds. No murmur heard.  Pulmonary:      Effort:  Pulmonary effort is normal. No respiratory distress.      Breath sounds: Normal breath sounds.   Abdominal:      General: There is no distension.      Palpations: Abdomen is soft.      Tenderness: There is no abdominal tenderness. There is no guarding.   Musculoskeletal:      Cervical back: No rigidity.      Right lower leg: No edema.      Left lower leg: No edema.   Skin:     General: Skin is warm and dry.      Capillary Refill: Capillary refill takes less than 2 seconds.   Neurological:      Comments: E3 V4 M6  Drowsy. Oriented x self only. Expressive aphasia. Following commands with repeated encouragement. Emotionally labile.   CN II-XII grossly intact, specifically:  PERRLA. EOMI.   Difficult to assess 2/2 pt understanding but appears to have R sided visual deficits   No facial asymmetry. Facial sensation intact in V1-V3.  Tongue midline.   Shoulder shrug symmetric.  + Cough  Motor:  RUE 5/5  RLE 5/5  LUE 5/5  LLE 5/5  Sensation intact and symmetric throughout          Unable to test memory, judgment, insight, fund of knowledge, coordination, gait due to level of consciousness.       Medications:  ContinuousniCARdipine, Last Rate: 2.5 mg/hr (10/15/23 1401)    Scheduledmagnesium sulfate IVPB, 2 g, Once  memantine, 5 mg, BID  QUEtiapine, 25 mg, QHS  senna-docusate 8.6-50 mg, 1 tablet, BID  sodium chloride 0.9%, 500 mL, Once    PRNacetaminophen, 650 mg, Q6H PRN  dextrose 10%, 12.5 g, PRN  dextrose 10%, 25 g, PRN  glucagon (human recombinant), 1 mg, PRN  hydrALAZINE, 10 mg, Q4H PRN  insulin aspart U-100, 0-5 Units, Q6H PRN  labetalol, 10 mg, Q4H PRN  magnesium oxide, 800 mg, PRN  magnesium oxide, 800 mg, PRN  ondansetron, 4 mg, Q8H PRN  oxyCODONE, 5 mg, Q6H PRN  potassium bicarbonate, 35 mEq, PRN  potassium bicarbonate, 50 mEq, PRN  potassium bicarbonate, 60 mEq, PRN  potassium, sodium phosphates, 2 packet, PRN  potassium, sodium phosphates, 2 packet, PRN  potassium, sodium phosphates, 2 packet, PRN  sodium chloride  0.9%, 10 mL, PRN      Today I personally reviewed pertinent medications, lines/drains/airways, imaging, cardiology results, laboratory results, microbiology results, notably:    CTH   Stable intracranial hemorrhage (intraparenchymal intraventricular and subdural) and stable mild midline shift.  No hydrocephalus.    Diet  Diet Adult Regular (IDDSI Level 7) Thin  Diet Adult Regular (IDDSI Level 7) Thin

## 2023-10-15 NOTE — CONSULTS
"[facility] - Neuro Critical Care  Adult Nutrition  Consult Note    SUMMARY     Recommendations    1. Continue Regular Diet as tolerated.   2. Recommend ONS Boost Plus- Vanilla with all meals.   3. Monitor I/Os, weight and labs.   4. RD following.    Goals: Meet % EEN/EPN by follow-up date.  Nutrition Goal Status: new  Communication of RD Recs: other (comment) (POC)    Assessment and Plan    Nutrition Problem  Inadequate oral intake    Related to (etiology):   Early satiety    Signs and Symptoms (as evidenced by):   Decreased PO intake ~25-50% at meals     Interventions/Recommendations (treatment strategy):  Collaboration of nutrition care with other providers  Commercial beverages      Nutrition Diagnosis Status:   New      Reason for Assessment    Reason For Assessment: consult  Diagnosis: other (see comments) (Nontraumatic cortical hemorrhage of left cerebral hemisphere)  Relevant Medical History: acid reflux, kidney stone  Interdisciplinary Rounds: did not attend  General Information Comments: RD consulted to assess dietary needs. No dietary restrictions PTA. Appetite fair patient like soups/stews. PO intake 25-50%. RD suggested ONS to family members are willing to try with patient. -50lbs. Pt appears nourished, may be at risk for malnutrition.  Nutrition Discharge Planning: Pending Clinical Course    Nutrition Risk Screen    Nutrition Risk Screen: no indicators present    Nutrition/Diet History    Patient Reported Diet/Restrictions/Preferences: general  Spiritual, Cultural Beliefs, Jehovah's witness Practices, Values that Affect Care: no  Food Allergies: NKFA  Factors Affecting Nutritional Intake: nausea/vomiting    Anthropometrics    Temp: 98.2 °F (36.8 °C)  Height: 5' 5" (165.1 cm)  Height (inches): 65 in  Weight Method: Bed Scale  Weight: 64 kg (141 lb)  Weight (lb): 141 lb  Ideal Body Weight (IBW), Male: 136 lb  % Ideal Body Weight, Male (lb): 103.68 %  BMI (Calculated): 23.5   "     Lab/Procedures/Meds    Pertinent Labs Reviewed: reviewed  Pertinent Labs Comments: CO2 20, Glu 127, P 2.6, TBili 1.2, ALT 9  Pertinent Medications Reviewed: reviewed  Pertinent Medications Comments: famotidine, senna-docusate      Estimated/Assessed Needs    Weight Used For Calorie Calculations: 64 kg (141 lb)  Energy Calorie Requirements (kcal): 1599 kcal/d (25 kcal/kg)  Energy Need Method: Kcal/kg  Protein Requirements: 78 g/d (1.2 g/kg)  Weight Used For Protein Calculations: 64 kg (141 lb)        RDA Method (mL): 1599         Nutrition Prescription Ordered    Current Diet Order: Regular    Evaluation of Received Nutrient/Fluid Intake    I/O: -175 mL  Comments: LBM: 10/13  % Intake of Estimated Energy Needs: 75 - 100 %  % Meal Intake: 25 - 50 %    Nutrition Risk    Level of Risk/Frequency of Follow-up: low (1x/ week)       Monitor and Evaluation    Food and Nutrient Intake: food and beverage intake, energy intake  Food and Nutrient Adminstration: diet order  Knowledge/Beliefs/Attitudes: food and nutrition knowledge/skill, beliefs and attitudes  Physical Activity and Function: nutrition-related ADLs and IADLs, factors affecting access to physical activity  Anthropometric Measurements: weight, weight change, body mass index  Biochemical Data, Medical Tests and Procedures: electrolyte and renal panel, gastrointestinal profile, glucose/endocrine profile, inflammatory profile, lipid profile  Nutrition-Focused Physical Findings: overall appearance, extremities, muscles and bones, head and eyes, skin       Nutrition Follow-Up    RD Follow-up?: Yes    Gen Luz Registration Eligible, Provisional LDN

## 2023-10-15 NOTE — HOSPITAL COURSE
10/14/2023 MRI yesterday suggestive of CAA. EKG with bifasicular block. Pt's wife denies any known cardiac history. Spoke with Dr. Gillies in cardiology, low level of concern considering EKG is unchanged from prior. Extubated to room air.  10/15/2023 Pt with nausea/vomiting and restlessness overnight and this AM. SBP elevated around periods of n/v requiring low dose cardene. Holding off on starting oral antihypertensives as pt was not requiring cardene yesterday. CTH stable IPH and SDH, stable MLS. Started seroquel 25 qHS. Passed SLP, started diet.  10/16/2023 Pt lethargic from seroquel this AM, reduce dose to 12.5qHS. Off of cardene since 4am. Started losartan 25. Oliguric, given  bolus and started on maintenance NS @ 50. Start SQH tonight. Plan for step down tomorrow if BP stable overnight.   10/17/2023 Episodes of bradycardia and A fib with RVR over night with concern for tachy la syndrome. D/c fluids.   10/18/2023 Hydralazine initiated yesterday and increased overnight. Cardene off with no prn use x 12 hours. Stable for stroke step down.

## 2023-10-15 NOTE — PLAN OF CARE
Recommendations    1. Continue Regular Diet as tolerated.   2. Recommend ONS Boost Plus- Vanilla with all meals.   3. Monitor I/Os, weight and labs.   4. RD following.    Goals: Meet % EEN/EPN by follow-up date.  Nutrition Goal Status: new  Communication of RD Recs: other (comment) (POC)

## 2023-10-16 LAB
ALBUMIN SERPL BCP-MCNC: 3.3 G/DL (ref 3.5–5.2)
ALP SERPL-CCNC: 73 U/L (ref 55–135)
ALT SERPL W/O P-5'-P-CCNC: 13 U/L (ref 10–44)
ANION GAP SERPL CALC-SCNC: 11 MMOL/L (ref 8–16)
AST SERPL-CCNC: 24 U/L (ref 10–40)
BASOPHILS # BLD AUTO: 0.02 K/UL (ref 0–0.2)
BASOPHILS NFR BLD: 0.1 % (ref 0–1.9)
BILIRUB SERPL-MCNC: 1 MG/DL (ref 0.1–1)
BUN SERPL-MCNC: 18 MG/DL (ref 8–23)
CALCIUM SERPL-MCNC: 9.1 MG/DL (ref 8.7–10.5)
CHLORIDE SERPL-SCNC: 108 MMOL/L (ref 95–110)
CO2 SERPL-SCNC: 20 MMOL/L (ref 23–29)
CREAT SERPL-MCNC: 0.8 MG/DL (ref 0.5–1.4)
DIFFERENTIAL METHOD: ABNORMAL
EOSINOPHIL # BLD AUTO: 0 K/UL (ref 0–0.5)
EOSINOPHIL NFR BLD: 0 % (ref 0–8)
ERYTHROCYTE [DISTWIDTH] IN BLOOD BY AUTOMATED COUNT: 12.6 % (ref 11.5–14.5)
EST. GFR  (NO RACE VARIABLE): >60 ML/MIN/1.73 M^2
GLUCOSE SERPL-MCNC: 129 MG/DL (ref 70–110)
HCT VFR BLD AUTO: 38 % (ref 40–54)
HGB BLD-MCNC: 12.9 G/DL (ref 14–18)
IMM GRANULOCYTES # BLD AUTO: 0.05 K/UL (ref 0–0.04)
IMM GRANULOCYTES NFR BLD AUTO: 0.4 % (ref 0–0.5)
LYMPHOCYTES # BLD AUTO: 1.2 K/UL (ref 1–4.8)
LYMPHOCYTES NFR BLD: 9.2 % (ref 18–48)
MAGNESIUM SERPL-MCNC: 2.1 MG/DL (ref 1.6–2.6)
MCH RBC QN AUTO: 32.3 PG (ref 27–31)
MCHC RBC AUTO-ENTMCNC: 33.9 G/DL (ref 32–36)
MCV RBC AUTO: 95 FL (ref 82–98)
MONOCYTES # BLD AUTO: 0.8 K/UL (ref 0.3–1)
MONOCYTES NFR BLD: 5.7 % (ref 4–15)
NEUTROPHILS # BLD AUTO: 11.4 K/UL (ref 1.8–7.7)
NEUTROPHILS NFR BLD: 84.6 % (ref 38–73)
NRBC BLD-RTO: 0 /100 WBC
PHOSPHATE SERPL-MCNC: 2.5 MG/DL (ref 2.7–4.5)
PLATELET # BLD AUTO: 250 K/UL (ref 150–450)
PMV BLD AUTO: 10 FL (ref 9.2–12.9)
POCT GLUCOSE: 108 MG/DL (ref 70–110)
POCT GLUCOSE: 118 MG/DL (ref 70–110)
POTASSIUM SERPL-SCNC: 3.7 MMOL/L (ref 3.5–5.1)
PROT SERPL-MCNC: 6.5 G/DL (ref 6–8.4)
RBC # BLD AUTO: 4 M/UL (ref 4.6–6.2)
SODIUM SERPL-SCNC: 139 MMOL/L (ref 136–145)
WBC # BLD AUTO: 13.44 K/UL (ref 3.9–12.7)

## 2023-10-16 PROCEDURE — 94761 N-INVAS EAR/PLS OXIMETRY MLT: CPT

## 2023-10-16 PROCEDURE — 51701 INSERT BLADDER CATHETER: CPT

## 2023-10-16 PROCEDURE — 97535 SELF CARE MNGMENT TRAINING: CPT

## 2023-10-16 PROCEDURE — 97530 THERAPEUTIC ACTIVITIES: CPT

## 2023-10-16 PROCEDURE — 51798 US URINE CAPACITY MEASURE: CPT

## 2023-10-16 PROCEDURE — 99233 SBSQ HOSP IP/OBS HIGH 50: CPT | Mod: ,,, | Performed by: PSYCHIATRY & NEUROLOGY

## 2023-10-16 PROCEDURE — 97162 PT EVAL MOD COMPLEX 30 MIN: CPT

## 2023-10-16 PROCEDURE — 92523 SPEECH SOUND LANG COMPREHEN: CPT

## 2023-10-16 PROCEDURE — 25000003 PHARM REV CODE 250

## 2023-10-16 PROCEDURE — 97166 OT EVAL MOD COMPLEX 45 MIN: CPT

## 2023-10-16 PROCEDURE — 85025 COMPLETE CBC W/AUTO DIFF WBC: CPT | Performed by: REGISTERED NURSE

## 2023-10-16 PROCEDURE — 83735 ASSAY OF MAGNESIUM: CPT | Performed by: REGISTERED NURSE

## 2023-10-16 PROCEDURE — 99233 PR SUBSEQUENT HOSPITAL CARE,LEVL III: ICD-10-PCS | Mod: ,,, | Performed by: PSYCHIATRY & NEUROLOGY

## 2023-10-16 PROCEDURE — 80053 COMPREHEN METABOLIC PANEL: CPT | Performed by: REGISTERED NURSE

## 2023-10-16 PROCEDURE — 63600175 PHARM REV CODE 636 W HCPCS: Performed by: REGISTERED NURSE

## 2023-10-16 PROCEDURE — 84100 ASSAY OF PHOSPHORUS: CPT | Performed by: REGISTERED NURSE

## 2023-10-16 PROCEDURE — 63600175 PHARM REV CODE 636 W HCPCS

## 2023-10-16 PROCEDURE — 25000003 PHARM REV CODE 250: Performed by: INTERNAL MEDICINE

## 2023-10-16 PROCEDURE — 99233 PR SUBSEQUENT HOSPITAL CARE,LEVL III: ICD-10-PCS | Mod: FS,,, | Performed by: PSYCHIATRY & NEUROLOGY

## 2023-10-16 PROCEDURE — 25000003 PHARM REV CODE 250: Performed by: REGISTERED NURSE

## 2023-10-16 PROCEDURE — 20000000 HC ICU ROOM

## 2023-10-16 PROCEDURE — 99233 SBSQ HOSP IP/OBS HIGH 50: CPT | Mod: FS,,, | Performed by: PSYCHIATRY & NEUROLOGY

## 2023-10-16 PROCEDURE — 97112 NEUROMUSCULAR REEDUCATION: CPT

## 2023-10-16 PROCEDURE — 92526 ORAL FUNCTION THERAPY: CPT

## 2023-10-16 RX ORDER — HEPARIN SODIUM 5000 [USP'U]/ML
5000 INJECTION, SOLUTION INTRAVENOUS; SUBCUTANEOUS EVERY 8 HOURS
Status: DISCONTINUED | OUTPATIENT
Start: 2023-10-16 | End: 2023-10-20 | Stop reason: HOSPADM

## 2023-10-16 RX ORDER — AMLODIPINE BESYLATE 5 MG/1
5 TABLET ORAL NIGHTLY
Status: DISCONTINUED | OUTPATIENT
Start: 2023-10-16 | End: 2023-10-16

## 2023-10-16 RX ORDER — AMOXICILLIN 250 MG
1 CAPSULE ORAL 2 TIMES DAILY
Status: DISCONTINUED | OUTPATIENT
Start: 2023-10-16 | End: 2023-10-17

## 2023-10-16 RX ORDER — LOSARTAN POTASSIUM 50 MG/1
50 TABLET ORAL DAILY
Status: DISCONTINUED | OUTPATIENT
Start: 2023-10-17 | End: 2023-10-17

## 2023-10-16 RX ORDER — ACETAMINOPHEN 325 MG/1
650 TABLET ORAL EVERY 6 HOURS PRN
Status: DISCONTINUED | OUTPATIENT
Start: 2023-10-16 | End: 2023-10-20 | Stop reason: HOSPADM

## 2023-10-16 RX ORDER — METHOCARBAMOL 500 MG/1
500 TABLET, FILM COATED ORAL 4 TIMES DAILY
Status: DISCONTINUED | OUTPATIENT
Start: 2023-10-16 | End: 2023-10-16

## 2023-10-16 RX ORDER — SODIUM CHLORIDE 9 MG/ML
INJECTION, SOLUTION INTRAVENOUS CONTINUOUS
Status: DISCONTINUED | OUTPATIENT
Start: 2023-10-16 | End: 2023-10-17

## 2023-10-16 RX ORDER — SILODOSIN 4 MG/1
4 CAPSULE ORAL DAILY
Status: DISCONTINUED | OUTPATIENT
Start: 2023-10-16 | End: 2023-10-20 | Stop reason: HOSPADM

## 2023-10-16 RX ORDER — METHOCARBAMOL 500 MG/1
500 TABLET, FILM COATED ORAL EVERY 6 HOURS PRN
Status: DISCONTINUED | OUTPATIENT
Start: 2023-10-16 | End: 2023-10-20 | Stop reason: HOSPADM

## 2023-10-16 RX ORDER — LOSARTAN POTASSIUM 25 MG/1
25 TABLET ORAL DAILY
Status: DISCONTINUED | OUTPATIENT
Start: 2023-10-16 | End: 2023-10-16

## 2023-10-16 RX ORDER — HYDRALAZINE HYDROCHLORIDE 20 MG/ML
10 INJECTION INTRAMUSCULAR; INTRAVENOUS EVERY 4 HOURS PRN
Status: DISCONTINUED | OUTPATIENT
Start: 2023-10-16 | End: 2023-10-19

## 2023-10-16 RX ORDER — AMLODIPINE BESYLATE 5 MG/1
5 TABLET ORAL DAILY
Status: DISCONTINUED | OUTPATIENT
Start: 2023-10-16 | End: 2023-10-17

## 2023-10-16 RX ORDER — LABETALOL HCL 20 MG/4 ML
10 SYRINGE (ML) INTRAVENOUS EVERY 4 HOURS PRN
Status: DISCONTINUED | OUTPATIENT
Start: 2023-10-16 | End: 2023-10-17

## 2023-10-16 RX ADMIN — LOSARTAN POTASSIUM 25 MG: 25 TABLET, FILM COATED ORAL at 11:10

## 2023-10-16 RX ADMIN — ACETAMINOPHEN 650 MG: 325 TABLET ORAL at 12:10

## 2023-10-16 RX ADMIN — SODIUM CHLORIDE: 9 INJECTION, SOLUTION INTRAVENOUS at 12:10

## 2023-10-16 RX ADMIN — ACETAMINOPHEN 650 MG: 325 TABLET ORAL at 07:10

## 2023-10-16 RX ADMIN — HYDRALAZINE HYDROCHLORIDE 10 MG: 20 INJECTION, SOLUTION INTRAMUSCULAR; INTRAVENOUS at 08:10

## 2023-10-16 RX ADMIN — MEMANTINE HYDROCHLORIDE 5 MG: 5 TABLET ORAL at 08:10

## 2023-10-16 RX ADMIN — POTASSIUM & SODIUM PHOSPHATES POWDER PACK 280-160-250 MG 2 PACKET: 280-160-250 PACK at 04:10

## 2023-10-16 RX ADMIN — HEPARIN SODIUM 5000 UNITS: 5000 INJECTION INTRAVENOUS; SUBCUTANEOUS at 09:10

## 2023-10-16 RX ADMIN — SILODOSIN 4 MG: 4 CAPSULE ORAL at 04:10

## 2023-10-16 RX ADMIN — METHOCARBAMOL 500 MG: 500 TABLET ORAL at 09:10

## 2023-10-16 RX ADMIN — SODIUM CHLORIDE 250 ML: 9 INJECTION, SOLUTION INTRAVENOUS at 11:10

## 2023-10-16 RX ADMIN — SENNOSIDES AND DOCUSATE SODIUM 1 TABLET: 50; 8.6 TABLET ORAL at 08:10

## 2023-10-16 RX ADMIN — SENNOSIDES AND DOCUSATE SODIUM 1 TABLET: 50; 8.6 TABLET ORAL at 09:10

## 2023-10-16 RX ADMIN — HYDRALAZINE HYDROCHLORIDE 10 MG: 20 INJECTION, SOLUTION INTRAMUSCULAR; INTRAVENOUS at 12:10

## 2023-10-16 RX ADMIN — POTASSIUM & SODIUM PHOSPHATES POWDER PACK 280-160-250 MG 2 PACKET: 280-160-250 PACK at 10:10

## 2023-10-16 RX ADMIN — SODIUM CHLORIDE: 9 INJECTION, SOLUTION INTRAVENOUS at 11:10

## 2023-10-16 RX ADMIN — QUETIAPINE FUMARATE 12.5 MG: 25 TABLET ORAL at 09:10

## 2023-10-16 RX ADMIN — AMLODIPINE BESYLATE 5 MG: 5 TABLET ORAL at 04:10

## 2023-10-16 RX ADMIN — ACETAMINOPHEN 650 MG: 325 TABLET ORAL at 09:10

## 2023-10-16 RX ADMIN — HYDRALAZINE HYDROCHLORIDE 10 MG: 20 INJECTION, SOLUTION INTRAMUSCULAR; INTRAVENOUS at 09:10

## 2023-10-16 RX ADMIN — HEPARIN SODIUM 5000 UNITS: 5000 INJECTION INTRAVENOUS; SUBCUTANEOUS at 02:10

## 2023-10-16 RX ADMIN — HYDRALAZINE HYDROCHLORIDE 10 MG: 20 INJECTION, SOLUTION INTRAMUSCULAR; INTRAVENOUS at 04:10

## 2023-10-16 RX ADMIN — MEMANTINE HYDROCHLORIDE 5 MG: 5 TABLET ORAL at 09:10

## 2023-10-16 RX ADMIN — NICARDIPINE HYDROCHLORIDE 5 MG/HR: 0.2 INJECTION, SOLUTION INTRAVENOUS at 02:10

## 2023-10-16 NOTE — PLAN OF CARE
PT evaluation complete and appropriate goals established.    Problem: Physical Therapy  Goal: Physical Therapy Goal  Description: Goals to be met by: 2023     Patient will increase functional independence with mobility by performin. Supine to sit with Stand-by Assistance  2. Sit to supine with Stand-by Assistance  3. Sit to stand transfer with Stand-by Assistance  4. Bed to chair transfer with Stand-by Assistance using LRAD  5. Gait  x 100 feet with Contact Guard Assistance using LRAD.   6. Lower extremity exercise program x15 reps per handout, with independence    Outcome: Ongoing, Progressing     10/16/2023

## 2023-10-16 NOTE — PLAN OF CARE
[facility] Neuro Critical Care  Initial Discharge Assessment       Primary Care Provider: Jabari Hollis MD    Admission Diagnosis: Intracerebral hemorrhage [I61.9]    Admission Date: 10/14/2023  Expected Discharge Date: 10/20/2023    Transition of Care Barriers: None    Payor: MEDICARE / Plan: MEDICARE PART A & B / Product Type: Government /     Extended Emergency Contact Information  Primary Emergency Contact: Farhana Pittman   [place]  Mobile Phone: [phone]  Relation: Spouse    Discharge Plan A: Skilled Nursing Facility  Discharge Plan B: Rehab      [facility] [address] [place] [address], [place] - [address]  [address]  [place] [address]  Phone: [phone] Fax: [phone]      [place]  [facility] [place] History:   Diagnosis Date    Acid reflux     Cataract     Kidney stone          CM met with patient and Farhana Pittman (wife) [phone] in room for Discharge Planning Assessment.  Patient is unable to answer questions.  Per wife, the patient lives with wife in a single story house with 0 step(s) to enter.   Per wife, the patient was independent with ADLS and used no dme for ambulation.  Patient will have assistance from his wife upon discharge.   Discharge Planning Booklet given to patient/family and discussed.  All questions addressed.  CM will follow for needs.      Initial Assessment (most recent)       Adult Discharge Assessment - 10/16/23 1511          Discharge Assessment    Assessment Type Discharge Planning Assessment     Confirmed/corrected address, phone number and insurance Yes     Confirmed Demographics Correct on Facesheet     Source of Information family     If unable to respond/provide information was family/caregiver contacted? Yes     Contact Name/Number Farhana Pittman (wife) [phone]     Communicated HAYLEY with patient/caregiver Date not available/Unable to determine     Reason For Admission Non Traumatic Cortical Hemorrhage of left Cerebral  Hemisphere     People in Home spouse     [place] [facility] [place]     Do you expect to return to your current living situation? Yes     Do you have help at home or someone to help you manage your care at home? Yes     Who are your caregiver(s) and their phone number(s)? Farhana Pittman (wife) [phone]     Prior to hospitilization cognitive status: Alert/Oriented     Current cognitive status: Unable to Assess     Walking or Climbing Stairs --   independent    Dressing/Bathing --   independent    Home Accessibility stairs to enter home     Number of Stairs, Main Entrance none     Home Layout Able to live on 1st floor     Equipment Currently Used at Home none     Readmission within 30 days? No     Patient currently being followed by outpatient case management? No     Do you currently have service(s) that help you manage your care at home? No     Do you take prescription medications? Yes     Do you have prescription coverage? Yes     Coverage Medicare     Do you have any problems affording any of your prescribed medications? No     Is the patient taking medications as prescribed? yes     Who is going to help you get home at discharge? Farhana Pittman (wife) [phone]     Are you on dialysis? No     Do you take coumadin? No     DME Needed Upon Discharge  other (see comments)   tbd    Discharge Plan discussed with: Spouse/sig other     Name(s) and Number(s) Farhana Pittman (wife) [phone]     Transition of Care Barriers None     Discharge Plan A Skilled Nursing Facility     Discharge Plan B Rehab        Physical Activity    On average, how many days per week do you engage in moderate to strenuous exercise (like a brisk walk)? 0 days     On average, how many minutes do you engage in exercise at this level? 0 min        Financial Resource Strain    How hard is it for you to pay for the very basics like food, housing, medical care, and heating? Not hard at all        Housing Stability    In the last 12 months, was  there a time when you were not able to pay the mortgage or rent on time? No     In the last 12 months, how many places have you lived? 1     In the last 12 months, was there a time when you did not have a steady place to sleep or slept in a shelter (including now)? No        Transportation Needs    In the past 12 months, has lack of transportation kept you from medical appointments or from getting medications? No     In the past 12 months, has lack of transportation kept you from meetings, work, or from getting things needed for daily living? No        Food Insecurity    Within the past 12 months, you worried that your food would run out before you got the money to buy more. Never true     Within the past 12 months, the food you bought just didn't last and you didn't have money to get more. Never true        Stress    Do you feel stress - tense, restless, nervous, or anxious, or unable to sleep at night because your mind is troubled all the time - these days? To some extent        Social Connections    In a typical week, how many times do you talk on the phone with family, friends, or neighbors? Once a week     How often do you get together with friends or relatives? More than three times a week     How often do you attend Amish or Judaism services? More than 4 times per year     Do you belong to any clubs or organizations such as Amish groups, unions, fraternal or athletic groups, or school groups? No     How often do you attend meetings of the clubs or organizations you belong to? Never     Are you , , , , never , or living with a partner?         Alcohol Use    Q1: How often do you have a drink containing alcohol? Never     Q2: How many drinks containing alcohol do you have on a typical day when you are drinking? Patient does not drink     Q3: How often do you have six or more drinks on one occasion? Never        OTHER    Name(s) of People in Home Farhana Sevin  (wife) [phone]                   Karlene Shields RN, CCRN-K, [facility]  Neuro-Critical Care   X [id number]

## 2023-10-16 NOTE — PROGRESS NOTES
[facility] Neuro Critical Care  Vascular Neurology  Comprehensive Stroke Center  Progress Note    Assessment/Plan:     * Nontraumatic cortical hemorrhage of left cerebral hemisphere  73yoM with PMH of vascular dementia that was transferred from [facility] with confusion and difficulty seeing around 5a.m. on 10/13. CTH left parietooccipital hemorrhage and left subdural hematoma, 3mm R MLS.CTA no LVO. MRI appears to show temporal/occipital hemorrhagic conversion within MCA distribution. Echo with EF 65% and mild LAE.    NAEO, neuro exam stable although has waxing/waning. Lethargic this morning during exam, although arouses with repeated stimuli. Cardene gtt weaned off today, however required PRN hydralazine a few hours later. Started on losartan for more consistent BP control, anticipate likely step down tomorrow once BP more consistently at goal.    Recommendations:  - Antithrombotics: none, contraindicated due to ICH  - Statin: none indicated due to ICH  - For headache give IV fluids, Mg, antiemetics, and acetaminophen (can consider gabapentin)  - Risk factors include: hypertension  - PT/OT/SLP/PM&R  - Mechanical SCD VTE prophylaxis  - SBP< 140    Midline shift of brain  3 mm R MLS on imaging. Neuro exam stable without interval changes. Continue with q1h checks.     Vasogenic brain edema  -Area of cerebral edema identified when reviewing brain imaging in the Left PCA territory, there is mass effect associated with it.   -Continue frequent q1hr neuro checks as any acute changes or worsening neuro status may signify expansion of the insult and/or area of the edema, which may require acute interventions to prevent loss of function and/or death.  -Obtain stat CTH for any new or worsening neuro changes and notify primary team immediately         10/16/2023 NAEO, neuro exam stable although has waxing/waning. Lethargic this morning during exam, although arouses with repeated stimuli. Cardene gtt  weaned off today, however required PRN hydralazine a few hours later. Started on losartan for more consistent BP control, anticipate likely step down tomorrow once BP more consistently at goal.        STROKE DOCUMENTATION        NIH Scale:  1a. Level of Consciousness: 2-->Not alert, requires repeated stimulation to attend, or is obtunded and requires strong or painful stimulation to make movements (not stereotyped)  1b. LOC Questions: 1-->Answers one question correctly  1c. LOC Commands: 0-->Performs both tasks correctly  2. Best Gaze: 1-->Partial gaze palsy, gaze is abnormal in one or both eyes, but forced deviation or total gaze paresis is not present  3. Visual: 2-->Complete hemianopia  4. Facial Palsy: 0-->Normal symmetrical movements  5a. Motor Arm, Left: 0-->No drift, limb holds 90 (or 45) degrees for full 10 secs  5b. Motor Arm, Right: 2-->Some effort against gravity, limb cannot get to or maintain (if cued) 90 (or 45) degrees, drifts down to bed, but has some effort against gravity  6a. Motor Leg, Left: 0-->No drift, leg holds 30 degree position for full 5 secs  6b. Motor Leg, Right: 2-->Some effort against gravity, leg falls to bed by 5 secs, but has some effort against gravity  7. Limb Ataxia: 0-->Absent  8. Sensory: 0-->Normal, no sensory loss  9. Best Language: 1-->Mild-to-moderate aphasia, some obvious loss of fluency or facility of comprehension, without significant limitation on ideas expressed or form of expression. Reduction of speech and/or comprehension, however, makes conversation. . . (see row details)  10. Dysarthria: 0-->Normal  11. Extinction and Inattention (formerly Neglect): 2-->Profound samantha-inattention/extinction more than 1 modality  Total (NIH Stroke Scale): 13       Modified Gilliam Score: 3  Malick Coma Scale:    ABCD2 Score:    [id number] Score:   HAS -BLED Score:   ICH Score:2  Hunt & Reese Classification:      Hemorrhagic change of an Ischemic Stroke: Does this patient have an  ischemic stroke with hemorrhagic changes? Yes, Grading Scale: PH Type 2 (PH-2) = was defined as dense hematoma > 30% of the infarcted area with substantial space-occupying effect, any extension into the intraventricular space, or any hemorrhagic lesion outside the infarcted area.  Is this a symptomatic change?  No - Hemorrhage is not clinically significant     Neurologic Chief Complaint: L parieto-occipital ICH    Subjective:     Interval History: Patient is seen for follow-up neurological assessment and treatment recommendations: NAEO, neuro exam stable although has waxing/waning. Lethargic this morning during exam, although arouses with repeated stimuli. Cardene gtt weaned off today, however required PRN hydralazine a few hours later. Started on losartan for more consistent BP control, anticipate likely step down tomorrow once BP more consistently at goal.    HPI, Past Medical, Family, and Social History remains the same as documented in the initial encounter.     Review of Systems   Unable to perform ROS: Other (lethargy)   Eyes:  Positive for visual disturbance.   Neurological:  Positive for facial asymmetry, speech difficulty and weakness.     Scheduled Meds:   heparin (porcine)  5,000 Units Subcutaneous Q8H    losartan  25 mg Oral Daily    memantine  5 mg Oral BID    QUEtiapine  12.5 mg Oral QHS    senna-docusate 8.6-50 mg  1 tablet Oral BID    silodosin  4 mg Oral Daily     Continuous Infusions:   sodium chloride 0.9% 50 mL/hr at 10/16/23 1122     PRN Meds:acetaminophen, dextrose 10%, dextrose 10%, hydrALAZINE, labetalol, magnesium oxide, magnesium oxide, methocarbamoL, ondansetron, oxyCODONE, potassium bicarbonate, potassium bicarbonate, potassium bicarbonate, potassium, sodium phosphates, potassium, sodium phosphates, potassium, sodium phosphates    Objective:     Vital Signs (Most Recent):  Temp: 97.3 °F (36.3 °C) (10/16/23 1105)  Pulse: (!) 55 (10/16/23 1322)  Resp: (!) 21 (10/16/23 1205)  BP: (!)  148/82 (10/16/23 1205)  SpO2: 97 % (10/16/23 1205)  BP Location: Left arm    Vital Signs Range (Last 24H):  Temp:  [97.3 °F (36.3 °C)-98.4 °F (36.9 °C)]   Pulse:  [49-72]   Resp:  [17-28]   BP: (124-163)/(55-82)   SpO2:  [94 %-98 %]   BP Location: Left arm       Physical Exam  Vitals and nursing note reviewed.   Constitutional:       General: He is not in acute distress.     Appearance: He is ill-appearing.   HENT:      Head: Normocephalic.      Mouth/Throat:      Mouth: Mucous membranes are moist.   Eyes:      General: Visual field deficit present.      Conjunctiva/sclera: Conjunctivae normal.   Cardiovascular:      Rate and Rhythm: Normal rate.   Pulmonary:      Effort: No respiratory distress.   Musculoskeletal:         General: Normal range of motion.      Cervical back: No rigidity.   Skin:     General: Skin is warm.   Neurological:      Mental Status: He is lethargic.      Cranial Nerves: Facial asymmetry present.      Motor: Weakness present.              Neurological Exam:   LOC: obtunded  Attention Span: poor  Language: Expressive aphasia  Articulation: No dysarthria  Orientation: Not oriented to place, Not oriented to time  Visual Fields: Visual neglect  EOM (CN III, IV, VI): Gaze preference  left  Facial Movement (CN VII): Lower facial weakness on the Right  Motor: Arm left  Normal 5/5  Leg left  Normal 5/5  Arm right  Paresis: 3/5  Leg right Paresis: 3/5  Sensation: Intact to light touch, temperature and vibration    Laboratory:  CMP:   Recent Labs   Lab 10/16/23  0039   CALCIUM 9.1   ALBUMIN 3.3*   PROT 6.5      K 3.7   CO2 20*      BUN 18   CREATININE 0.8   ALKPHOS 73   ALT 13   AST 24   BILITOT 1.0     CBC:   Recent Labs   Lab 10/16/23  0039   WBC 13.44*   RBC 4.00*   HGB 12.9*   HCT 38.0*      MCV 95   MCH 32.3*   MCHC 33.9     Lipid Panel:   Recent Labs   Lab 10/14/23  0404   CHOL 147   LDLCALC 87.8   HDL 38*   TRIG 106     Coagulation:   Recent Labs   Lab 10/14/23  0404   INR 1.0    APTT 22.8     Hgb A1C:   Recent Labs   Lab 10/14/23  0404   HGBA1C 5.7*     TSH:   Recent Labs   Lab 10/14/23  0404   TSH 2.782       Diagnostic Results     Brain Imaging   CTH without contrast 10/15/2023  Impression:  Stable intracranial hemorrhage (intraparenchymal intraventricular and subdural) and stable mild midline shift.  No hydrocephalus.     MRI Brain W WO contrast 10/14/2023  Impression:  Allowing for a difference in technique, similar appearance of left temporal occipital intraparenchymal hemorrhage, left subdural hemorrhage, and intraventricular extension.  No overt hydrocephalus.   No underlying enhancing intracranial lesion.  The venous sinuses appear patent.  numerous small foci of chronic hemosiderin staining are identified within the parenchyma perhaps representing a combination of amyloid angiopathy and hypertensive chronic microhemorrhages.     CTH without contrast 10/13/2023  Impression:  1. Approximate 7 x 5 cm parenchymal hematoma centered within the left occipital lobe moderate local mass effect and extension into the occipital horn left lateral ventricle.  No hydrocephalus.  2. Left-sided subdural hematoma measuring 4 mm in thickness with mild local mass effect and 4 mm left to right midline shift.      Vessel Imaging   CTA head and neck 10/14/2023  Impression:  Redemonstration of a left parieto-occipital intra-axial hematoma, with new intraventricular extension.   Not fully detailed above, there also remains an approximately 4 mm thick subdural hematoma centered over the lateral aspect of the left cerebral convexity.   There is approximately 3 mm rightward midline shift, similar to prior.   CTA head and neck shows no evidence of high-grade stenosis, aneurysm, or large vessel occlusion in the cerebrovascular arterial circulation.   The balloon cuff of the endotracheal tube appears to distend the tracheal lumen.  Correlate clinically, consider adjustment.       Cardiac Imaging   TTE  10/14/2023    Left Ventricle: The left ventricle is normal in size. Ventricular mass is normal. Normal wall thickness. Normal wall motion. There is normal systolic function. Ejection fraction by visual approximation is 65%. There is normal diastolic function.    Left Atrium: Left atrium is mildly dilated.    Right Ventricle: Normal right ventricular cavity size. Wall thickness is normal. Right ventricle wall motion  is normal. Systolic function is normal.    Aorta: Aortic root is mildly dilated measuring 4.44 cm. Ascending aorta is mildly dilated measuring 3.8 cm.    Pulmonary Artery: The estimated pulmonary artery systolic pressure is 28 mmHg.    IVC/SVC: Normal venous pressure at 3 mmHg.         Donna Bellamy PA-C  Comprehensive Stroke Center  Department of Vascular Neurology   [facility] - Neuro Critical Care

## 2023-10-16 NOTE — PLAN OF CARE
10/16/23 1636   Post-Acute Status   Post-Acute Authorization Placement   Post-Acute Placement Status Referrals Sent  (rehab)     Met with Pt and Pt wife to review discharge recommendation of rehab and she is agreeable to possible plan. Pt was sleeping. Discussed rehab criteria as being able to tolerate three hours of therapy and need for a MD to see Pt three times a week. If this changes, plan B would be a SNF facility.     Provided list of Rehab facilities in-network with patient's payor plan. Notified that blast referral sent to below listed facilities from list based on proximity to home/family support:   [facility] (preference)  2.   [facility]    If an additional preferred facility not listed above is identified, additional referral to be sent. If above facilities unable to accept, will send additional referrals to in-network providers.     Hilda Walker, GIBRAN  Neurocritical Care   [facility]  [id number]

## 2023-10-16 NOTE — PLAN OF CARE
Problem: SLP  Goal: SLP Goal  Description: Speech Language Pathology Goals  Goals expected to be met by 10/29    1. Pt will tolerate a regular sold and thin liquids diet without any overt s/sx of airway compromise.   2. Pt will participate in ongoing assessment of reading, writing and visuospatial skills to determine additional therapeutic needs.   3. Pt will independently orient x4.   4. Pt will independently answer multiunit/complex Y/N questions with 80% acc.  5. Pt will independently  follow 2-step directions with 80% acc.  6. Pt will answer simple problem solving tasks (compare/contrast/ sequence, math/time etc) with 80% acc following min assist.  7. Pt will independently complete a variety of word finding tasks with 80% acc.    Outcome: Ongoing, Progressing     Cognitive communication evaluation complete this date. Goals updated. SLP will continue to follow.

## 2023-10-16 NOTE — PLAN OF CARE
[facility] Care Plan    POC reviewed with Kobi Pittman and significant other and family at 1200. Pt's spouse and family verbalized understanding. Questions and concerns addressed. Pt required multiple PRN's throughout the shift, PO doses increased. Pt was able to get to chair and worked with PT and OT. Mentation improving throughout shift. Pt progressing toward goals. Will continue to monitor. See below and flowsheets for full assessment and VS info.             Is this a stroke patient? yes- Stroke booklet reviewed with family, risk factors identified for patient and stroke booklet remains at bedside for ongoing education.     Neuro:  [place] Coma Scale  Best Eye Response: 4-->(E4) spontaneous  Best Motor Response: 6-->(M6) obeys commands  Best Verbal Response: 4-->(V4) confused  Malick Coma Scale Score: 14  Assessment Qualifiers: patient not sedated/intubated, no eye obstruction present  Pupil PERRLA: yes     24 hr Temp:  [97.3 °F (36.3 °C)-98.4 °F (36.9 °C)]     CV:   Rhythm: sinus bradycardia  BP goals:   SBP < 160  MAP > 65    Resp:      Vent Mode: A/C  Set Rate: 12 BPM  Oxygen Concentration (%): 40  Vt Set: 450 mL  PEEP/CPAP: 5 cmH20    Plan: N/A    GI/:     Diet/Nutrition Received: regular  Last Bowel Movement: 10/13/23  Voiding Characteristics: due to void    Intake/Output Summary (Last 24 hours) at 10/16/2023 1804  Last data filed at 10/16/2023 1628  Gross per 24 hour   Intake 1022 ml   Output 675 ml   Net 347 ml     Unmeasured Output  Stool Occurrence: 0    Labs/Accuchecks:  Recent Labs   Lab 10/16/23  0039   WBC 13.44*   RBC 4.00*   HGB 12.9*   HCT 38.0*         Recent Labs   Lab 10/16/23  0039      K 3.7   CO2 20*      BUN 18   CREATININE 0.8   ALKPHOS 73   ALT 13   AST 24   BILITOT 1.0      Recent Labs   Lab 10/14/23  0404   INR 1.0   APTT 22.8      Recent Labs   Lab 10/13/23  2210   CPK 92   TROPONINI <0.012       Electrolytes: Electrolytes replaced  Accuchecks: none    Gtts:   sodium  chloride 0.9% 50 mL/hr at 10/16/23 1628       LDA/Wounds:  Lines/Drains/Airways       Peripheral Intravenous Line  Duration                  Peripheral IV - Single Lumen 10/13/23 2210 20 G Left Antecubital 2 days         Peripheral IV - Single Lumen 10/13/23 2258 18 G Anterior;Distal;Right Upper Arm 2 days                  Wounds: No  Wound care consulted: No

## 2023-10-16 NOTE — ASSESSMENT & PLAN NOTE
74 y/o M with PMH of vascular dementia presents with L ICH with IVH from OSH. Denies home anticoagulation use and recent falls/trauma. No seizure activity noted. Repeat CTA with no interval changes, no active bleeding and no evident vessel abnormalities.   ICH 2    -Admit to NCC  -VS/neuro checks q4h, sleep holiday w delirium precaution  - vitals, I&O q1h  -NSGY consulted  -Vascular neurology consulted  -SBP goal < 160  - losartan 25  -PRN labetalol/hydralazine/cardene  -Na+ > 135  -Seizure precautions  -MRI brain suggestive of CAA  -10/15 CTH stable IPH and SDH, stable MLS  - Passed SLP, started regular diet  - Monitoring BP overnight then step down to VN tomorrow if off cardene  -PT/OT/SLP

## 2023-10-16 NOTE — PROGRESS NOTES
[facility]  Neurocritical Care  Progress Note    Admit Date: 10/14/2023  Service Date: 10/16/2023  Length of Stay: 2    Subjective:     Chief Complaint: Nontraumatic cortical hemorrhage of left cerebral hemisphere    History of Present Illness: 74 y/o M with PMH of vascular dementia who presented to ED of [facility] with c/o confusion and difficulty seeing. Wife reports pt was in his usual state of health when he left the house at 3am to go cut grass. He returned home around 5pm, when his wife noticed he was having difficulty turning off his truck. He also reported visual changes and was unable to see any pictures on the wall. In the ED, he c/o generalized weakness and an intermittent L-sided headache x 1 week. Denied any falls or trauma to the area. He initially presented with GCS 15 and no focal deficits. CTH showed large posterior parieto-occipital intracerebral hemorrhage with 5mm shift. He was given Keppra and started on Cardene for BP control. GCS declined from 15 to 13 and pt began projectile vomiting. He was subsequently intubated. Transferred to [facility] for higher level of care and will be admitted to [facility] for further eval and management.       Hospital Course: 10/14/2023 MRI yesterday suggestive of CAA. EKG with bifasicular block. Pt's wife denies any known cardiac history. Spoke with Dr. Gillies in cardiology, low level of concern considering EKG is unchanged from prior. Extubated to room air.  10/15/2023 Pt with nausea/vomiting and restlessness overnight and this AM. SBP elevated around periods of n/v requiring low dose cardene. Holding off on starting oral antihypertensives as pt was not requiring cardene yesterday. CTH stable IPH and SDH, stable MLS. Started seroquel 25 qHS. Passed SLP, started diet.  10/16/2023 Pt lethargic from seroquel this AM, reduce dose to 12.5qHS. Off of cardene since 4am. Started losartan 25. Oliguric, given  bolus and started on maintenance NS @ 50. Start SQH  tonight. Plan for step down tomorrow if BP stable overnight.       Interval History:  Pt lethargic from seroquel this AM, reduce dose to 12.5qHS. Off of cardene since 4am. Started losartan 25. Oliguric, given  bolus and started on maintenance NS @ 50. Start SQH tonight. Stable for step down.     Review of Systems   Unable to perform ROS: Dementia   Constitutional:  Negative for fever.   HENT:  Negative for trouble swallowing.    Eyes:  Positive for visual disturbance.   Respiratory:  Negative for shortness of breath.    Cardiovascular:  Negative for chest pain, palpitations and leg swelling.   Gastrointestinal:  Negative for abdominal pain.   Genitourinary:  Negative for difficulty urinating.   Musculoskeletal:  Positive for gait problem.   Neurological:  Positive for headaches. Negative for weakness.   Psychiatric/Behavioral:  Positive for confusion and decreased concentration. The patient is nervous/anxious.        Objective:     Vitals:  Temp: 97.3 °F (36.3 °C)  Pulse: (!) 55  Rhythm: sinus bradycardia  BP: (!) 148/82  MAP (mmHg): 110  Resp: (!) 21  SpO2: 97 %    Temp  Min: 97.3 °F (36.3 °C)  Max: 98.4 °F (36.9 °C)  Pulse  Min: 49  Max: 72  BP  Min: 124/56  Max: 163/77  MAP (mmHg)  Min: 83  Max: 112  Resp  Min: 17  Max: 28  SpO2  Min: 94 %  Max: 98 %    10/15 0701 - 10/16 0700  In: 713.8 [I.V.:213.8]  Out: 150 [Urine:150]   Unmeasured Output  Stool Occurrence: 0        Physical Exam  Vitals and nursing note reviewed.   Constitutional:       Appearance: He is normal weight. He is not toxic-appearing or diaphoretic.   HENT:      Head: Normocephalic and atraumatic.      Right Ear: External ear normal.      Left Ear: External ear normal.      Nose: Nose normal.      Mouth/Throat:      Mouth: Mucous membranes are moist.      Pharynx: Oropharynx is clear.   Eyes:      Pupils: Pupils are equal, round, and reactive to light.   Cardiovascular:      Rate and Rhythm: Regular rhythm. Bradycardia present.      Heart  sounds: Normal heart sounds. No murmur heard.  Pulmonary:      Effort: Pulmonary effort is normal. No respiratory distress.   Abdominal:      General: There is no distension.      Palpations: Abdomen is soft.      Tenderness: There is no abdominal tenderness. There is no guarding.   Musculoskeletal:      Cervical back: No rigidity.      Right lower leg: No edema.      Left lower leg: No edema.   Skin:     General: Skin is warm and dry.      Capillary Refill: Capillary refill takes less than 2 seconds.   Neurological:      Comments: E3 V4 M6  Drowsy. Oriented x self and place. Mild expressive aphasia. Following commands with repeated encouragement. Emotionally labile.   CN II-XII grossly intact, specifically:  PERRLA.   EOMI.   R hemianopia plus some cortical blindness crossing over midline to left side. Vision intact in far left field.  No facial asymmetry. Facial sensation intact in V1-V3.  Tongue midline.   Shoulder shrug symmetric.  + Cough  Motor:  RUE 4+/5  RLE 4+/5  LUE 5/5  LLE 5/5  Sensation intact. Right sided neglect      Psychiatric:         Mood and Affect: Affect is labile.       Unable to test memory, judgment, insight, fund of knowledge, coordination, gait due to level of consciousness.       Medications:  Continuoussodium chloride 0.9%, Last Rate: 50 mL/hr at 10/16/23 1122    Scheduledheparin (porcine), 5,000 Units, Q8H  losartan, 25 mg, Daily  memantine, 5 mg, BID  QUEtiapine, 12.5 mg, QHS  senna-docusate 8.6-50 mg, 1 tablet, BID  silodosin, 4 mg, Daily    PRNacetaminophen, 650 mg, Q6H PRN  dextrose 10%, 12.5 g, PRN  dextrose 10%, 25 g, PRN  hydrALAZINE, 10 mg, Q4H PRN  labetalol, 10 mg, Q4H PRN  magnesium oxide, 800 mg, PRN  magnesium oxide, 800 mg, PRN  methocarbamoL, 500 mg, Q6H PRN  ondansetron, 4 mg, Q8H PRN  oxyCODONE, 5 mg, Q6H PRN  potassium bicarbonate, 35 mEq, PRN  potassium bicarbonate, 50 mEq, PRN  potassium bicarbonate, 60 mEq, PRN  potassium, sodium phosphates, 2 packet,  PRN  potassium, sodium phosphates, 2 packet, PRN  potassium, sodium phosphates, 2 packet, PRN      Today I personally reviewed pertinent medications, lines/drains/airways, imaging, cardiology results, laboratory results, microbiology results, notably:    CTH   Stable intracranial hemorrhage (intraparenchymal intraventricular and subdural) and stable mild midline shift.  No hydrocephalus.    Diet  Diet Adult Regular (IDDSI Level 7) Thin  Diet Adult Regular (IDDSI Level 7) Thin          Assessment/Plan:     Neuro  * Nontraumatic cortical hemorrhage of left cerebral hemisphere  72 y/o M with PMH of vascular dementia presents with L ICH with IVH from OSH. Denies home anticoagulation use and recent falls/trauma. No seizure activity noted. Repeat CTA with no interval changes, no active bleeding and no evident vessel abnormalities.   ICH 2    -Admit to [facility]CC  -VS/neuro checks q4h, sleep holiday w delirium precaution  - vitals, I&O q1h  -NSGY consulted  -Vascular neurology consulted  -SBP goal < 160  - losartan 25  -PRN labetalol/hydralazine/cardene  -Na+ > 135  -Seizure precautions  -MRI brain suggestive of CAA  -10/15 CTH stable IPH and SDH, stable MLS  - Passed SLP, started regular diet  - Monitoring BP overnight then step down to VN tomorrow if off cardene  -PT/OT/SLP    Midline shift of brain  -Neuro checks q4h     Vasogenic brain edema  -Neuro checks q4h  -NSGY consulted, signed off  -Na+ > 135  -Accuchecks q6h w/ low dose SSI for glycemic control  -MRI brain w/ and w/o contrast similar appearance of left temporal occipital intraparenchymal hemorrhage, left subdural hemorrhage, and intraventricular extension. No underlying enhancing intracranial lesion. 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    Vascular dementia  -Continue home Namenda  - Seroquel 12.5 qHS    Palliative  Care  Endotracheally intubated  Intubated at OSH due to GCS decline    -Daily ABGs  -Daily CXR  -10/14 Extubated to room air     Resolved        The patient is being Prophylaxed for:  Venous Thromboembolism with: Mechanical or Chemical  Stress Ulcer with: Not Applicable   Ventilator Pneumonia with: not applicable    Activity Orders          Diet Adult Regular (IDDSI Level 7) Thin: Regular starting at 10/15 0856    Progressive Mobility Protocol (mobilize patient to their highest level of functioning at least twice daily) starting at 10/14 0800    Turn patient starting at 10/14 0400    Elevate HOB starting at 10/14 0206        Full Code    Critical care time spent on the evaluation and treatment of severe organ dysfunction, review of pertinent labs and imaging studies, discussions with consulting providers and discussions with patient/family: 35 minutes.    Dakota Del Castillo PA-C  Neurocritical Care  [facility]

## 2023-10-16 NOTE — ASSESSMENT & PLAN NOTE
-Neuro checks q4h  -NSGY consulted, signed off  -Na+ > 135  -Accuchecks q6h w/ low dose SSI for glycemic control  -MRI brain w/ and w/o contrast similar appearance of left temporal occipital intraparenchymal hemorrhage, left subdural hemorrhage, and intraventricular extension. No underlying enhancing intracranial lesion. 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

## 2023-10-16 NOTE — ASSESSMENT & PLAN NOTE
73yoM with PMH of vascular dementia that was transferred from [facility] with confusion and difficulty seeing around 5a.m. on 10/13. CTH left parietooccipital hemorrhage and left subdural hematoma, 3mm R MLS.CTA no LVO. MRI appears to show temporal/occipital hemorrhagic conversion within MCA distribution. Echo with EF 65% and mild LAE.    NAEO, neuro exam stable although has waxing/waning. Lethargic this morning during exam, although arouses with repeated stimuli. Cardene gtt weaned off today, however required PRN hydralazine a few hours later. Started on losartan for more consistent BP control, anticipate likely step down tomorrow once BP more consistently at goal.    Recommendations:  - Antithrombotics: none, contraindicated due to ICH  - Statin: none indicated due to ICH  - For headache give IV fluids, Mg, antiemetics, and acetaminophen (can consider gabapentin)  - Risk factors include: hypertension  - PT/OT/SLP/PM&R  - Mechanical SCD VTE prophylaxis  - SBP< 140

## 2023-10-16 NOTE — ASSESSMENT & PLAN NOTE
Intubated at OSH due to GCS decline    -Daily ABGs  -Daily CXR  -10/14 Extubated to room air     Resolved

## 2023-10-16 NOTE — SUBJECTIVE & OBJECTIVE
Interval History:  Pt lethargic from seroquel this AM, reduce dose to 12.5qHS. Off of cardene since 4am. Started losartan 25. Oliguric, given  bolus and started on maintenance NS @ 50. Start SQH tonight. Stable for step down.     Review of Systems   Unable to perform ROS: Dementia   Constitutional:  Negative for fever.   HENT:  Negative for trouble swallowing.    Eyes:  Positive for visual disturbance.   Respiratory:  Negative for shortness of breath.    Cardiovascular:  Negative for chest pain, palpitations and leg swelling.   Gastrointestinal:  Negative for abdominal pain.   Genitourinary:  Negative for difficulty urinating.   Musculoskeletal:  Positive for gait problem.   Neurological:  Positive for headaches. Negative for weakness.   Psychiatric/Behavioral:  Positive for confusion and decreased concentration. The patient is nervous/anxious.        Objective:     Vitals:  Temp: 97.3 °F (36.3 °C)  Pulse: (!) 55  Rhythm: sinus bradycardia  BP: (!) 148/82  MAP (mmHg): 110  Resp: (!) 21  SpO2: 97 %    Temp  Min: 97.3 °F (36.3 °C)  Max: 98.4 °F (36.9 °C)  Pulse  Min: 49  Max: 72  BP  Min: 124/56  Max: 163/77  MAP (mmHg)  Min: 83  Max: 112  Resp  Min: 17  Max: 28  SpO2  Min: 94 %  Max: 98 %    10/15 0701 - 10/16 0700  In: 713.8 [I.V.:213.8]  Out: 150 [Urine:150]   Unmeasured Output  Stool Occurrence: 0        Physical Exam  Vitals and nursing note reviewed.   Constitutional:       Appearance: He is normal weight. He is not toxic-appearing or diaphoretic.   HENT:      Head: Normocephalic and atraumatic.      Right Ear: External ear normal.      Left Ear: External ear normal.      Nose: Nose normal.      Mouth/Throat:      Mouth: Mucous membranes are moist.      Pharynx: Oropharynx is clear.   Eyes:      Pupils: Pupils are equal, round, and reactive to light.   Cardiovascular:      Rate and Rhythm: Regular rhythm. Bradycardia present.      Heart sounds: Normal heart sounds. No murmur heard.  Pulmonary:      Effort:  Pulmonary effort is normal. No respiratory distress.   Abdominal:      General: There is no distension.      Palpations: Abdomen is soft.      Tenderness: There is no abdominal tenderness. There is no guarding.   Musculoskeletal:      Cervical back: No rigidity.      Right lower leg: No edema.      Left lower leg: No edema.   Skin:     General: Skin is warm and dry.      Capillary Refill: Capillary refill takes less than 2 seconds.   Neurological:      Comments: E3 V4 M6  Drowsy. Oriented x self and place. Mild expressive aphasia. Following commands with repeated encouragement. Emotionally labile.   CN II-XII grossly intact, specifically:  PERRLA.   EOMI.   R hemianopia plus some cortical blindness crossing over midline to left side. Vision intact in far left field.  No facial asymmetry. Facial sensation intact in V1-V3.  Tongue midline.   Shoulder shrug symmetric.  + Cough  Motor:  RUE 4+/5  RLE 4+/5  LUE 5/5  LLE 5/5  Sensation intact. Right sided neglect      Psychiatric:         Mood and Affect: Affect is labile.       Unable to test memory, judgment, insight, fund of knowledge, coordination, gait due to level of consciousness.       Medications:  Continuoussodium chloride 0.9%, Last Rate: 50 mL/hr at 10/16/23 1122    Scheduledheparin (porcine), 5,000 Units, Q8H  losartan, 25 mg, Daily  memantine, 5 mg, BID  QUEtiapine, 12.5 mg, QHS  senna-docusate 8.6-50 mg, 1 tablet, BID  silodosin, 4 mg, Daily    PRNacetaminophen, 650 mg, Q6H PRN  dextrose 10%, 12.5 g, PRN  dextrose 10%, 25 g, PRN  hydrALAZINE, 10 mg, Q4H PRN  labetalol, 10 mg, Q4H PRN  magnesium oxide, 800 mg, PRN  magnesium oxide, 800 mg, PRN  methocarbamoL, 500 mg, Q6H PRN  ondansetron, 4 mg, Q8H PRN  oxyCODONE, 5 mg, Q6H PRN  potassium bicarbonate, 35 mEq, PRN  potassium bicarbonate, 50 mEq, PRN  potassium bicarbonate, 60 mEq, PRN  potassium, sodium phosphates, 2 packet, PRN  potassium, sodium phosphates, 2 packet, PRN  potassium, sodium phosphates, 2  packet, PRN      Today I personally reviewed pertinent medications, lines/drains/airways, imaging, cardiology results, laboratory results, microbiology results, notably:    CTH   Stable intracranial hemorrhage (intraparenchymal intraventricular and subdural) and stable mild midline shift.  No hydrocephalus.    Diet  Diet Adult Regular (IDDSI Level 7) Thin  Diet Adult Regular (IDDSI Level 7) Thin

## 2023-10-16 NOTE — HOSPITAL COURSE
10/16/2023 NAEO, neuro exam stable although has waxing/waning. Lethargic this morning during exam, although arouses with repeated stimuli. Cardene gtt weaned off today, however required PRN hydralazine a few hours later. Started on losartan for more consistent BP control, anticipate likely step down tomorrow once BP more consistently at goal.  10/17/2023 patient had episode of afib RVR last night which was confirmed on EKG. Patient converted back to NSR, metoprolol not given. Off cardene, requiring prn hydralazine/labetalol, dispo rehab    Patient with left parietooccipital hemorrhage and left subdural hematoma who orginally presented to [facility] with confusion and blurry/double vision stepped down to NPU. CTA revealed no LVO. MRI revealed temporal/occipital hemorrhagic conversion within an MCA distribution infarct. Patient had episode of A fib that resolved without intervention and echo remarkable for mild left atrial enlargement. Etiology thought to be cardioembolic but cerebral amyloid angiopathy seen on MRI. He is not a candidate for anticoagulation right now given evidence of cerebral amyloid angiopathy even though he has a history of atrial fibrillation. Patient will follow up with vascular neurology in 4-6 weeks who can make a definitive decision on anticoagulation.

## 2023-10-16 NOTE — SUBJECTIVE & OBJECTIVE
Neurologic Chief Complaint: L parieto-occipital ICH    Subjective:     Interval History: Patient is seen for follow-up neurological assessment and treatment recommendations: NAEO, neuro exam stable although has waxing/waning. Lethargic this morning during exam, although arouses with repeated stimuli. Cardene gtt weaned off today, however required PRN hydralazine a few hours later. Started on losartan for more consistent BP control, anticipate likely step down tomorrow once BP more consistently at goal.    HPI, Past Medical, Family, and Social History remains the same as documented in the initial encounter.     Review of Systems   Unable to perform ROS: Other (lethargy)   Eyes:  Positive for visual disturbance.   Neurological:  Positive for facial asymmetry, speech difficulty and weakness.     Scheduled Meds:   heparin (porcine)  5,000 Units Subcutaneous Q8H    losartan  25 mg Oral Daily    memantine  5 mg Oral BID    QUEtiapine  12.5 mg Oral QHS    senna-docusate 8.6-50 mg  1 tablet Oral BID    silodosin  4 mg Oral Daily     Continuous Infusions:   sodium chloride 0.9% 50 mL/hr at 10/16/23 1122     PRN Meds:acetaminophen, dextrose 10%, dextrose 10%, hydrALAZINE, labetalol, magnesium oxide, magnesium oxide, methocarbamoL, ondansetron, oxyCODONE, potassium bicarbonate, potassium bicarbonate, potassium bicarbonate, potassium, sodium phosphates, potassium, sodium phosphates, potassium, sodium phosphates    Objective:     Vital Signs (Most Recent):  Temp: 97.3 °F (36.3 °C) (10/16/23 1105)  Pulse: (!) 55 (10/16/23 1322)  Resp: (!) 21 (10/16/23 1205)  BP: (!) 148/82 (10/16/23 1205)  SpO2: 97 % (10/16/23 1205)  BP Location: Left arm    Vital Signs Range (Last 24H):  Temp:  [97.3 °F (36.3 °C)-98.4 °F (36.9 °C)]   Pulse:  [49-72]   Resp:  [17-28]   BP: (124-163)/(55-82)   SpO2:  [94 %-98 %]   BP Location: Left arm       Physical Exam  Vitals and nursing note reviewed.   Constitutional:       General: He is not in acute  distress.     Appearance: He is ill-appearing.   HENT:      Head: Normocephalic.      Mouth/Throat:      Mouth: Mucous membranes are moist.   Eyes:      General: Visual field deficit present.      Conjunctiva/sclera: Conjunctivae normal.   Cardiovascular:      Rate and Rhythm: Normal rate.   Pulmonary:      Effort: No respiratory distress.   Musculoskeletal:         General: Normal range of motion.      Cervical back: No rigidity.   Skin:     General: Skin is warm.   Neurological:      Mental Status: He is lethargic.      Cranial Nerves: Facial asymmetry present.      Motor: Weakness present.              Neurological Exam:   LOC: obtunded  Attention Span: poor  Language: Expressive aphasia  Articulation: No dysarthria  Orientation: Not oriented to place, Not oriented to time  Visual Fields: Visual neglect  EOM (CN III, IV, VI): Gaze preference  left  Facial Movement (CN VII): Lower facial weakness on the Right  Motor: Arm left  Normal 5/5  Leg left  Normal 5/5  Arm right  Paresis: 3/5  Leg right Paresis: 3/5  Sensation: Intact to light touch, temperature and vibration    Laboratory:  CMP:   Recent Labs   Lab 10/16/23  0039   CALCIUM 9.1   ALBUMIN 3.3*   PROT 6.5      K 3.7   CO2 20*      BUN 18   CREATININE 0.8   ALKPHOS 73   ALT 13   AST 24   BILITOT 1.0     CBC:   Recent Labs   Lab 10/16/23  0039   WBC 13.44*   RBC 4.00*   HGB 12.9*   HCT 38.0*      MCV 95   MCH 32.3*   MCHC 33.9     Lipid Panel:   Recent Labs   Lab 10/14/23  0404   CHOL 147   LDLCALC 87.8   HDL 38*   TRIG 106     Coagulation:   Recent Labs   Lab 10/14/23  0404   INR 1.0   APTT 22.8     Hgb A1C:   Recent Labs   Lab 10/14/23  0404   HGBA1C 5.7*     TSH:   Recent Labs   Lab 10/14/23  0404   TSH 2.782       Diagnostic Results     Brain Imaging   CTH without contrast 10/15/2023  Impression:  Stable intracranial hemorrhage (intraparenchymal intraventricular and subdural) and stable mild midline shift.  No hydrocephalus.     MRI  Brain W WO contrast 10/14/2023  Impression:  Allowing for a difference in technique, similar appearance of left temporal occipital intraparenchymal hemorrhage, left subdural hemorrhage, and intraventricular extension.  No overt hydrocephalus.   No underlying enhancing intracranial lesion.  The venous sinuses appear patent.  numerous small foci of chronic hemosiderin staining are identified within the parenchyma perhaps representing a combination of amyloid angiopathy and hypertensive chronic microhemorrhages.     CTH without contrast 10/13/2023  Impression:  1. Approximate 7 x 5 cm parenchymal hematoma centered within the left occipital lobe moderate local mass effect and extension into the occipital horn left lateral ventricle.  No hydrocephalus.  2. Left-sided subdural hematoma measuring 4 mm in thickness with mild local mass effect and 4 mm left to right midline shift.      Vessel Imaging   CTA head and neck 10/14/2023  Impression:  Redemonstration of a left parieto-occipital intra-axial hematoma, with new intraventricular extension.   Not fully detailed above, there also remains an approximately 4 mm thick subdural hematoma centered over the lateral aspect of the left cerebral convexity.   There is approximately 3 mm rightward midline shift, similar to prior.   CTA head and neck shows no evidence of high-grade stenosis, aneurysm, or large vessel occlusion in the cerebrovascular arterial circulation.   The balloon cuff of the endotracheal tube appears to distend the tracheal lumen.  Correlate clinically, consider adjustment.       Cardiac Imaging   TTE 10/14/2023    Left Ventricle: The left ventricle is normal in size. Ventricular mass is normal. Normal wall thickness. Normal wall motion. There is normal systolic function. Ejection fraction by visual approximation is 65%. There is normal diastolic function.    Left Atrium: Left atrium is mildly dilated.    Right Ventricle: Normal right ventricular cavity size.  Wall thickness is normal. Right ventricle wall motion  is normal. Systolic function is normal.    Aorta: Aortic root is mildly dilated measuring 4.44 cm. Ascending aorta is mildly dilated measuring 3.8 cm.    Pulmonary Artery: The estimated pulmonary artery systolic pressure is 28 mmHg.    IVC/SVC: Normal venous pressure at 3 mmHg.

## 2023-10-16 NOTE — PT/OT/SLP EVAL
Physical Therapy Co-Evaluation and Co-Treatment    Patient Name:  Kobi Pittman   MRN:  [id number]    Co-evaluation and co-treatment performed for this visit due to suspected patient need for two skilled therapists to ensure patient and staff safety and to accommodate for patient activity tolerance/pain management   Recommendations:     Discharge Recommendations: High Intensity Therapy  Discharge Equipment Recommendations: bedside commode, bath bench   Barriers to discharge: Increased level of assist and Decreased caregiver support at current functional status    Assessment:     Kobi Pittman is a 73 y.o. male admitted with a medical diagnosis of Nontraumatic cortical hemorrhage of left cerebral hemisphere. He presents with the following impairments/functional limitations: weakness, visual deficits, impaired cognition, impaired endurance, impaired self care skills, impaired functional mobility, gait instability, impaired balance, decreased safety awareness, impaired coordination, impaired fine motor, pain. Pt with good tolerance to PT/OT evaluation. Pt pleasantly confused throughout session. Pt with decreased arousal at start of session with minimal eye opening and limited verbalizations. Once seated at EOB, pt with improved arousal and increased participation in therapy session. Pt requiring limited assist for all functional mobility at this time with mild impairments noted with dynamic postural stability. Pt most limited by AMS and visual impairments at this time with notable deficits to R visual field. Patient presents with good participation and motivation to return to prior level of function with high intensity therapy.  The patient demonstrates appropriate endurance, strength, pain control, and fine motor control to participate in up to 3 hours or 15hrs of combined therapy post acute in order to reduce fall risk, reduce caregiver burden, improve quality of life, and return to PLOF. Pt would continue to benefit  "from skilled acute PT in order to address current deficits and progress functional mobility.     Rehab Prognosis: Good; patient would benefit from acute skilled PT services 4 x/week to address these deficits and reach maximum level of function.  Recent Surgery: * No surgery found *      Plan:     During this hospitalization, patient to be seen 4 x/week to address the identified rehab impairments via gait training, therapeutic activities, therapeutic exercises, neuromuscular re-education and progress toward the following goals:    Plan of Care Expires:  11/16/23    Subjective     Chief Complaint: Headache  Patient/Family Comments/Goals: "I can't see"  Pain/Comfort:  Pain Rating 1:  (unable to rate)  Location 1:  (headache)  Pain Addressed 1: Reposition, Distraction, Nurse notified  Pain Rating Post-Intervention 1:  (did not rate)    Patients cultural, spiritual, Adventist conflicts given the current situation: no    Living Environment:  Living Environment: Patient lives with their spouse and sister in a single story house with number of outside stair(s): 0 and tub-shower combo.  Prior Level of Function: Prior to admission, patient was independent and driving and retired.  Equipment Used at Home: none.  DME owned (not currently used): standard walker  Assistance Upon Discharge:  spouse and sister; unable to provide significant physical assist    Objective:     Communicated with nursing prior to session. Patient found left sidelying with HOB elevated with blood pressure cuff, pulse ox (continuous), telemetry upon PT entry to room.    General Precautions: Standard, fall, vision impaired  Orthopedic Precautions:N/A    Braces: N/A    Exams:  Cognitive Exam:  Patient is oriented to Person, Place, Not oriented to time, Not oriented to situation, follows commands 100% of the time  RLE ROM: WNL  RLE Strength: WFL  LLE ROM: WNL  LLE Strength: WFL  Sensation: Intact light touch to BLEs  Vision: Impaired R peripheral " vision    Functional Mobility:  Bed Mobility:  Verbal cues for sequencing and technique  Rolling Left:  stand by assistance  Supine to Sit: minimum assistance with HOB elevated and use of bed rail  Sit to Supine: minimum assistance for LE management with HOB flat  Transfers:    Sit to Stand: minimum assistance with no AD  Verbal cues for upright posture, increased glute activation, and hand/foot placement  Gait: Patient ambulated 6 steps fwd/bwd with hand-held assist and minimum assistance.   Patient demonstrates unsteady gait, decreased step length, narrow base of support, decreased weight shift, decreased foot clearance, flexed posture, decreased kika, and decreased arm swing.  Patient required cues for upright posture, gluteal activation, sequencing, obstacle navigation, increased step size, and increased foot clearance  All lines remained intact throughout ambulation trial.  Balance:   Static Sitting: Good, able to maintain for 10 minute(s) with stand by assistance  Dynamic Sitting: Good: Patient accepts moderate challenge, stand by assistance  Static Standing: Fair, able to maintain for 4 minute(s) with minimum assistance  Dynamic Standing: Fair: Patient accepts minimal challenge, minimum assistance    Therapeutic Activities and Exercises:  Patient educated on role of acute care PT and PT POC, safety while in hospital including calling nurse for mobility, and call light usage  Pt educated on the effects of bed rest and the importance of OOB activity. Pt encouraged to sit UIC majority of day as tolerated and continue daily ambulation with nursing assist. Pt verbalized understanding.  Answered all questions within PT scope of practice and addressed functional mobility concerns.  Pt oriented to person, place, time, and situation    AM-PAC 6 CLICK MOBILITY  Total Score:17     Patient left HOB elevated with all lines intact, call button in reach, RN notified, and RN and family present.    GOALS:    Multidisciplinary Problems       Physical Therapy Goals          Problem: Physical Therapy    Goal Priority Disciplines Outcome Goal Variances Interventions   Physical Therapy Goal     PT, PT/OT Ongoing, Progressing     Description: Goals to be met by: 2023     Patient will increase functional independence with mobility by performin. Supine to sit with Stand-by Assistance  2. Sit to supine with Stand-by Assistance  3. Sit to stand transfer with Stand-by Assistance  4. Bed to chair transfer with Stand-by Assistance using LRAD  5. Gait  x 100 feet with Contact Guard Assistance using LRAD.   6. Lower extremity exercise program x15 reps per handout, with independence                         History:     Past Medical History:   Diagnosis Date    Acid reflux     Cataract     Kidney stone        Past Surgical History:   Procedure Laterality Date    COLONOSCOPY         Time Tracking:     PT Received On: 10/16/23  PT Start Time: 1054     PT Stop Time: 1123  PT Total Time (min): 29 min     Billable Minutes: Evaluation 10 Therapeutic Activity 15      10/16/2023

## 2023-10-16 NOTE — ASSESSMENT & PLAN NOTE
-Area of cerebral edema identified when reviewing brain imaging in the Left PCA territory, there is mass effect associated with it.   -Continue frequent q1hr neuro checks as any acute changes or worsening neuro status may signify expansion of the insult and/or area of the edema, which may require acute interventions to prevent loss of function and/or death.  -Obtain stat CTH for any new or worsening neuro changes and notify primary team immediately

## 2023-10-16 NOTE — PLAN OF CARE
[facility] Care Plan    POC reviewed with Kobi Pittman and family at 1400. Pt with expressive aphasia, unable to verbalize understanding. Questions and concerns addressed. No acute events today. Pt progressing toward goals. Will continue to monitor. See below and flowsheets for full assessment and VS info.     -Family updated at the bedside.   -Pt restless, co HA. Tylenol and oxy PRN admin per order.   - bolus admin.   -Bladder scanned at 1800 with 207 cc          Is this a stroke patient? yes- Stroke booklet reviewed with patient, risk factors identified for patient and stroke booklet remains at bedside for ongoing education.     Neuro:  Malick Coma Scale  Best Eye Response: 3-->(E3) to speech  Best Motor Response: 6-->(M6) obeys commands  Best Verbal Response: 4-->(V4) confused  [place] Coma Scale Score: 13  Assessment Qualifiers: no eye obstruction present, patient not sedated/intubated  Pupil PERRLA: yes     24 hr Temp:  [98 °F (36.7 °C)-98.5 °F (36.9 °C)]     CV:   Rhythm: normal sinus rhythm  BP goals:   SBP < 160  MAP > 65    Resp:      Vent Mode: A/C  Set Rate: 12 BPM  Oxygen Concentration (%): 40  Vt Set: 450 mL  PEEP/CPAP: 5 cmH20    Plan: N/A    GI/:     Diet/Nutrition Received: NPO  Last Bowel Movement: 10/13/23  Voiding Characteristics: due to void    Intake/Output Summary (Last 24 hours) at 10/15/2023 1915  Last data filed at 10/15/2023 1715  Gross per 24 hour   Intake 682.1 ml   Output --   Net 682.1 ml     Unmeasured Output  Stool Occurrence: 0    Labs/Accuchecks:  Recent Labs   Lab 10/15/23  0241   WBC 11.89   RBC 4.23*   HGB 13.4*   HCT 39.7*         Recent Labs   Lab 10/15/23  0241      K 3.9   CO2 20*      BUN 17   CREATININE 0.8   ALKPHOS 75   ALT 9*   AST 18   BILITOT 1.2*      Recent Labs   Lab 10/14/23  0404   INR 1.0   APTT 22.8      Recent Labs   Lab 10/13/23  2210   CPK 92   TROPONINI <0.012       Electrolytes: Electrolytes replaced  Accuchecks: Q6H    Gtts:    niCARdipine 2.5 mg/hr (10/15/23 6035)       LDA/Wounds:  Lines/Drains/Airways       Peripheral Intravenous Line  Duration                  Peripheral IV - Single Lumen 10/13/23 2210 20 G Left Antecubital 1 day         Peripheral IV - Single Lumen 10/13/23 2258 18 G Anterior;Distal;Right Upper Arm 1 day                  Wounds: No  Wound care consulted: No

## 2023-10-16 NOTE — PT/OT/SLP EVAL
"Speech Language Pathology Evaluation  Cognitive/Bedside Swallow Tx    Patient Name:  Kobi Pittman   MRN:  [id number]  Admitting Diagnosis: Nontraumatic cortical hemorrhage of left cerebral hemisphere    Recommendations:                  General Recommendations:  Dysphagia therapy, Speech/language therapy, and Cognitive-linguistic therapy  Diet recommendations:  Regular Diet - IDDSI Level 7, Thin liquids - IDDSI Level 0   Aspiration Precautions: 1 bite/sip at a time, Feed only when awake/alert, HOB to 90 degrees, Small bites/sips, and Standard aspiration precautions   General Precautions: Standard, vision impaired, fall  Communication strategies:  provide increased time to answer and go to room if call light pushed    Assessment:     Kobi Pittman is a 73 y.o. male with an SLP diagnosis of Cognitive-Linguistic Impairment.  He presents with decreased PO intake and decreased CONNIE this date.    History:     Past Medical History:   Diagnosis Date    Acid reflux     Cataract     Kidney stone      Past Surgical History:   Procedure Laterality Date    COLONOSCOPY       Prior Intubation HX:  10/13-10/14     Modified Barium Swallow: none     Chest X-Rays: 10/14:"Stable chest"     Prior diet: regular/thin    Subjective     Spoke with RN prior to session. Attempted to see pt this AM, though pt did not rouse despite max cuing. Upon SLP return, pt able to rouse though required max prompting to participate in cognitive communication assessment. Family at bedside.     Pain/Comfort:  Pain Rating 1: 10/10  Location 1: head    Respiratory Status: Room air    Objective:     Cognitive Status:    Arousal/Alertness Delayed response to stimuli   and Inconsistent responses    Attention Alternating attention deficit   , Divided attention deficit  , and Sustained attention deficit    Perseveration Not present  Orientation Person  [employer] 50% MOD assist and Compare/contrast 25% MOD assist    Money/Time 0/2 extended time and max " prompting/repetitions     Receptive Language:   Comprehension:      Questions Simple yes/no 100%  Complex yes/no 0%  Commands  One step 100%  two step basic commands 25%    Pragmatics:    imapired, inconsistent eye contact  , and maintenance impaired    Expressive Language:  Verbal:    Automatic Speech  Counting 100%, Days of the week 100%, and Months of the year 11/12  Naming Divergent responsive 5/5 animals extended time, max prompting and Single word responsive naming 100%  Sentence completion: 67%      Motor Speech:  WFL    Voice:   WFL  Intensity decreased     Visual-Spatial:  emy    Reading:   emy      Written Expression:   Dominant hand: Right  Assessment hand: Right  Poor legibility, unable to write name   Requires ongoing assessment     Oral Musculature Evaluation  Oral Musculature: WFL  Dentition: present and adequate  Secretion Management: adequate  Mucosal Quality: good  Mandibular Strength and Mobility: WFL  Oral Labial Strength and Mobility: WFL  Lingual Strength and Mobility: WFL  Volitional Cough: present  Volitional Swallow: timely  Voice Prior to PO Intake: clear    Bedside Swallow Eval:   Consistencies Assessed:  Thin liquids boost x 3 oz  Refused additional intake     Oral Phase:   WFL    Pharyngeal Phase:   no overt clinical signs/symptoms of aspiration  no overt clinical signs/symptoms of pharyngeal dysphagia  Pt with belching and regurgitation to oral cavity after intake, able to swallow down.      Compensatory Strategies  HOB upright during and after intake     Treatment: HOB raised. Provided constant verbal and tactile stimulation across session to maintain pt engagement and alertness. Pt appears to be more motivated to engage with family. Continue to recommend current diet with encouragement for PO intake. Education provided re: role of SLP, impressions and recommendation following cognitive communication assessment, ongoing diet recs, swallow precs, s/s aspiration ,  speech/language/cognitive activities to implement with pt throughout the day and ST POC.  Family verbalized understanding and agreement. RN updated post session.     Goals:   Multidisciplinary Problems       SLP Goals          Problem: SLP    Goal Priority Disciplines Outcome   SLP Goal     SLP Ongoing, Progressing   Description: Speech Language Pathology Goals  Goals expected to be met by 10/29    1. Pt will tolerate a regular sold and thin liquids diet without any overt s/sx of airway compromise.   2. Pt will participate in ongoing assessment of reading, writing and visuospatial skills to determine additional therapeutic needs.   3. Pt will independently orient x4.   4. Pt will independently answer multiunit/complex Y/N questions with 80% acc.  5. Pt will independently  follow 2-step directions with 80% acc.  6. Pt will answer simple problem solving tasks (compare/contrast/ sequence, math/time etc) with 80% acc following min assist.  7. Pt will independently complete a variety of word finding tasks with 80% acc.                           Plan:     Patient to be seen:  4 x/week   Plan of Care expires:  11/14/23  Plan of Care reviewed with:  patient, family, spouse   SLP Follow-Up:  Yes       Discharge recommendations:  Therapy Intensity Recommendations at Discharge: High Intensity Therapy   Barriers to Discharge:  Level of Skilled Assistance Needed      Time Tracking:     SLP Treatment Date:   10/16/23  Speech Start Time:  1345  Speech Stop Time:  1414     Speech Total Time (min):  29 min    Billable Minutes: Eval 16 , Treatment Swallowing Dysfunction 5, and Self Care/Home Management Training 8    10/16/2023

## 2023-10-17 PROBLEM — I48.0 PAROXYSMAL ATRIAL FIBRILLATION: Status: ACTIVE | Noted: 2023-10-17

## 2023-10-17 LAB
ALBUMIN SERPL BCP-MCNC: 3.2 G/DL (ref 3.5–5.2)
ALP SERPL-CCNC: 78 U/L (ref 55–135)
ALT SERPL W/O P-5'-P-CCNC: 42 U/L (ref 10–44)
ANION GAP SERPL CALC-SCNC: 10 MMOL/L (ref 8–16)
AST SERPL-CCNC: 67 U/L (ref 10–40)
BASOPHILS # BLD AUTO: 0.03 K/UL (ref 0–0.2)
BASOPHILS NFR BLD: 0.3 % (ref 0–1.9)
BILIRUB SERPL-MCNC: 1 MG/DL (ref 0.1–1)
BUN SERPL-MCNC: 20 MG/DL (ref 8–23)
CALCIUM SERPL-MCNC: 9 MG/DL (ref 8.7–10.5)
CHLORIDE SERPL-SCNC: 105 MMOL/L (ref 95–110)
CO2 SERPL-SCNC: 23 MMOL/L (ref 23–29)
CREAT SERPL-MCNC: 0.8 MG/DL (ref 0.5–1.4)
DIFFERENTIAL METHOD: ABNORMAL
EOSINOPHIL # BLD AUTO: 0 K/UL (ref 0–0.5)
EOSINOPHIL NFR BLD: 0 % (ref 0–8)
ERYTHROCYTE [DISTWIDTH] IN BLOOD BY AUTOMATED COUNT: 12.8 % (ref 11.5–14.5)
EST. GFR  (NO RACE VARIABLE): >60 ML/MIN/1.73 M^2
GLUCOSE SERPL-MCNC: 121 MG/DL (ref 70–110)
HCT VFR BLD AUTO: 37.3 % (ref 40–54)
HGB BLD-MCNC: 12.6 G/DL (ref 14–18)
IMM GRANULOCYTES # BLD AUTO: 0.06 K/UL (ref 0–0.04)
IMM GRANULOCYTES NFR BLD AUTO: 0.5 % (ref 0–0.5)
LYMPHOCYTES # BLD AUTO: 0.9 K/UL (ref 1–4.8)
LYMPHOCYTES NFR BLD: 7.5 % (ref 18–48)
MAGNESIUM SERPL-MCNC: 1.8 MG/DL (ref 1.6–2.6)
MCH RBC QN AUTO: 31.8 PG (ref 27–31)
MCHC RBC AUTO-ENTMCNC: 33.8 G/DL (ref 32–36)
MCV RBC AUTO: 94 FL (ref 82–98)
MONOCYTES # BLD AUTO: 0.9 K/UL (ref 0.3–1)
MONOCYTES NFR BLD: 7.4 % (ref 4–15)
NEUTROPHILS # BLD AUTO: 9.7 K/UL (ref 1.8–7.7)
NEUTROPHILS NFR BLD: 84.3 % (ref 38–73)
NRBC BLD-RTO: 0 /100 WBC
PHOSPHATE SERPL-MCNC: 2.6 MG/DL (ref 2.7–4.5)
PLATELET # BLD AUTO: 235 K/UL (ref 150–450)
PMV BLD AUTO: 10.4 FL (ref 9.2–12.9)
POTASSIUM SERPL-SCNC: 3.5 MMOL/L (ref 3.5–5.1)
PROT SERPL-MCNC: 6.4 G/DL (ref 6–8.4)
RBC # BLD AUTO: 3.96 M/UL (ref 4.6–6.2)
SODIUM SERPL-SCNC: 138 MMOL/L (ref 136–145)
WBC # BLD AUTO: 11.52 K/UL (ref 3.9–12.7)

## 2023-10-17 PROCEDURE — 25000003 PHARM REV CODE 250: Performed by: INTERNAL MEDICINE

## 2023-10-17 PROCEDURE — 83735 ASSAY OF MAGNESIUM: CPT | Performed by: REGISTERED NURSE

## 2023-10-17 PROCEDURE — 97116 GAIT TRAINING THERAPY: CPT

## 2023-10-17 PROCEDURE — 97530 THERAPEUTIC ACTIVITIES: CPT

## 2023-10-17 PROCEDURE — 63600175 PHARM REV CODE 636 W HCPCS

## 2023-10-17 PROCEDURE — 63600175 PHARM REV CODE 636 W HCPCS: Performed by: REGISTERED NURSE

## 2023-10-17 PROCEDURE — 80053 COMPREHEN METABOLIC PANEL: CPT | Performed by: REGISTERED NURSE

## 2023-10-17 PROCEDURE — 25000003 PHARM REV CODE 250

## 2023-10-17 PROCEDURE — 84100 ASSAY OF PHOSPHORUS: CPT | Performed by: REGISTERED NURSE

## 2023-10-17 PROCEDURE — 25000003 PHARM REV CODE 250: Performed by: REGISTERED NURSE

## 2023-10-17 PROCEDURE — 99233 SBSQ HOSP IP/OBS HIGH 50: CPT | Mod: ,,, | Performed by: PSYCHIATRY & NEUROLOGY

## 2023-10-17 PROCEDURE — 20000000 HC ICU ROOM

## 2023-10-17 PROCEDURE — 99233 PR SUBSEQUENT HOSPITAL CARE,LEVL III: ICD-10-PCS | Mod: ,,, | Performed by: PSYCHIATRY & NEUROLOGY

## 2023-10-17 PROCEDURE — 99233 SBSQ HOSP IP/OBS HIGH 50: CPT | Mod: FS,,, | Performed by: PSYCHIATRY & NEUROLOGY

## 2023-10-17 PROCEDURE — 85025 COMPLETE CBC W/AUTO DIFF WBC: CPT | Performed by: REGISTERED NURSE

## 2023-10-17 PROCEDURE — 94761 N-INVAS EAR/PLS OXIMETRY MLT: CPT

## 2023-10-17 PROCEDURE — 99233 PR SUBSEQUENT HOSPITAL CARE,LEVL III: ICD-10-PCS | Mod: FS,,, | Performed by: PSYCHIATRY & NEUROLOGY

## 2023-10-17 RX ORDER — LOSARTAN POTASSIUM 50 MG/1
100 TABLET ORAL DAILY
Status: DISCONTINUED | OUTPATIENT
Start: 2023-10-18 | End: 2023-10-20 | Stop reason: HOSPADM

## 2023-10-17 RX ORDER — AMLODIPINE BESYLATE 5 MG/1
5 TABLET ORAL ONCE
Status: COMPLETED | OUTPATIENT
Start: 2023-10-17 | End: 2023-10-17

## 2023-10-17 RX ORDER — METOPROLOL TARTRATE 1 MG/ML
5 INJECTION, SOLUTION INTRAVENOUS EVERY 5 MIN PRN
Status: DISCONTINUED | OUTPATIENT
Start: 2023-10-17 | End: 2023-10-17

## 2023-10-17 RX ORDER — METOPROLOL TARTRATE 1 MG/ML
INJECTION, SOLUTION INTRAVENOUS
Status: DISCONTINUED
Start: 2023-10-17 | End: 2023-10-17

## 2023-10-17 RX ORDER — AMLODIPINE BESYLATE 10 MG/1
10 TABLET ORAL DAILY
Status: DISCONTINUED | OUTPATIENT
Start: 2023-10-18 | End: 2023-10-20 | Stop reason: HOSPADM

## 2023-10-17 RX ORDER — HYDRALAZINE HYDROCHLORIDE 25 MG/1
25 TABLET, FILM COATED ORAL EVERY 8 HOURS
Status: DISCONTINUED | OUTPATIENT
Start: 2023-10-17 | End: 2023-10-18

## 2023-10-17 RX ORDER — POLYETHYLENE GLYCOL 3350 17 G/17G
17 POWDER, FOR SOLUTION ORAL 2 TIMES DAILY
Status: DISCONTINUED | OUTPATIENT
Start: 2023-10-17 | End: 2023-10-20 | Stop reason: HOSPADM

## 2023-10-17 RX ORDER — AMOXICILLIN 250 MG
2 CAPSULE ORAL 2 TIMES DAILY
Status: DISCONTINUED | OUTPATIENT
Start: 2023-10-17 | End: 2023-10-20 | Stop reason: HOSPADM

## 2023-10-17 RX ORDER — LOSARTAN POTASSIUM 50 MG/1
50 TABLET ORAL ONCE
Status: COMPLETED | OUTPATIENT
Start: 2023-10-17 | End: 2023-10-17

## 2023-10-17 RX ADMIN — AMLODIPINE BESYLATE 5 MG: 5 TABLET ORAL at 08:10

## 2023-10-17 RX ADMIN — HYDRALAZINE HYDROCHLORIDE 10 MG: 20 INJECTION, SOLUTION INTRAMUSCULAR; INTRAVENOUS at 04:10

## 2023-10-17 RX ADMIN — ONDANSETRON 4 MG: 2 INJECTION INTRAMUSCULAR; INTRAVENOUS at 01:10

## 2023-10-17 RX ADMIN — OXYCODONE HYDROCHLORIDE 5 MG: 5 TABLET ORAL at 03:10

## 2023-10-17 RX ADMIN — LOSARTAN POTASSIUM 50 MG: 50 TABLET, FILM COATED ORAL at 08:10

## 2023-10-17 RX ADMIN — AMLODIPINE BESYLATE 5 MG: 5 TABLET ORAL at 05:10

## 2023-10-17 RX ADMIN — MEMANTINE HYDROCHLORIDE 5 MG: 5 TABLET ORAL at 09:10

## 2023-10-17 RX ADMIN — HYDRALAZINE HYDROCHLORIDE 25 MG: 25 TABLET, FILM COATED ORAL at 05:10

## 2023-10-17 RX ADMIN — HEPARIN SODIUM 5000 UNITS: 5000 INJECTION INTRAVENOUS; SUBCUTANEOUS at 06:10

## 2023-10-17 RX ADMIN — ACETAMINOPHEN 650 MG: 325 TABLET ORAL at 06:10

## 2023-10-17 RX ADMIN — METHOCARBAMOL 500 MG: 500 TABLET ORAL at 06:10

## 2023-10-17 RX ADMIN — POTASSIUM BICARBONATE 50 MEQ: 978 TABLET, EFFERVESCENT ORAL at 06:10

## 2023-10-17 RX ADMIN — HYDRALAZINE HYDROCHLORIDE 10 MG: 20 INJECTION, SOLUTION INTRAMUSCULAR; INTRAVENOUS at 12:10

## 2023-10-17 RX ADMIN — POTASSIUM & SODIUM PHOSPHATES POWDER PACK 280-160-250 MG 2 PACKET: 280-160-250 PACK at 06:10

## 2023-10-17 RX ADMIN — POTASSIUM & SODIUM PHOSPHATES POWDER PACK 280-160-250 MG 2 PACKET: 280-160-250 PACK at 10:10

## 2023-10-17 RX ADMIN — SENNOSIDES AND DOCUSATE SODIUM 1 TABLET: 50; 8.6 TABLET ORAL at 08:10

## 2023-10-17 RX ADMIN — HYDRALAZINE HYDROCHLORIDE 25 MG: 25 TABLET, FILM COATED ORAL at 09:10

## 2023-10-17 RX ADMIN — Medication 800 MG: at 12:10

## 2023-10-17 RX ADMIN — POLYETHYLENE GLYCOL 3350 17 G: 17 POWDER, FOR SOLUTION ORAL at 10:10

## 2023-10-17 RX ADMIN — QUETIAPINE FUMARATE 12.5 MG: 25 TABLET ORAL at 09:10

## 2023-10-17 RX ADMIN — LOSARTAN POTASSIUM 50 MG: 50 TABLET, FILM COATED ORAL at 02:10

## 2023-10-17 RX ADMIN — OXYCODONE HYDROCHLORIDE 5 MG: 5 TABLET ORAL at 08:10

## 2023-10-17 RX ADMIN — HEPARIN SODIUM 5000 UNITS: 5000 INJECTION INTRAVENOUS; SUBCUTANEOUS at 09:10

## 2023-10-17 RX ADMIN — Medication 800 MG: at 06:10

## 2023-10-17 RX ADMIN — HYDRALAZINE HYDROCHLORIDE 10 MG: 20 INJECTION, SOLUTION INTRAMUSCULAR; INTRAVENOUS at 05:10

## 2023-10-17 RX ADMIN — MEMANTINE HYDROCHLORIDE 5 MG: 5 TABLET ORAL at 08:10

## 2023-10-17 RX ADMIN — SENNOSIDES AND DOCUSATE SODIUM 2 TABLET: 50; 8.6 TABLET ORAL at 09:10

## 2023-10-17 RX ADMIN — LABETALOL HYDROCHLORIDE 10 MG: 5 INJECTION, SOLUTION INTRAVENOUS at 12:10

## 2023-10-17 RX ADMIN — SILODOSIN 4 MG: 4 CAPSULE ORAL at 08:10

## 2023-10-17 RX ADMIN — ACETAMINOPHEN 650 MG: 325 TABLET ORAL at 05:10

## 2023-10-17 RX ADMIN — POLYETHYLENE GLYCOL 3350 17 G: 17 POWDER, FOR SOLUTION ORAL at 09:10

## 2023-10-17 RX ADMIN — HEPARIN SODIUM 5000 UNITS: 5000 INJECTION INTRAVENOUS; SUBCUTANEOUS at 01:10

## 2023-10-17 NOTE — ASSESSMENT & PLAN NOTE
73yoM with PMH of vascular dementia who was transferred from [facility] with confusion and difficulty seeing around 5a.m. on 10/13. CTH left parietooccipital hemorrhage and left subdural hematoma, 3mm R MLS. CTA no LVO. MRI appears to show L temporal/occipital infarct with hemorrhagic conversion. Echo with EF 65% and mild LAE.    Overnight, patient had episode of afib RVR confirmed on EKG. Etiology of stroke likely cardioembolic. Off cardene, but continuing to require prn IV BP medications    Recommendations:  - Antithrombotics: none, contraindicated due to ICH, will need to discuss start date for anticoagulation  - Statin: atorvastatin 40 mg daily  - Risk factors include: hypertension, afib  - PT/OT/SLP/PM&R: recommending rehab  - Mechanical SCD VTE prophylaxis, sq heparin  - SBP< 140

## 2023-10-17 NOTE — ASSESSMENT & PLAN NOTE
-Area of cerebral edema identified when reviewing brain imaging in the Left PCA territory, there is mass effect associated with it.   -Continue frequent q1hr neuro checks as any acute changes or worsening neuro status may signify expansion of the insult and/or area of the edema, which may require acute interventions to prevent loss of function and/or death.  -Obtain stat CTH for any new or worsening neuro changes and notify primary team immediately  -stable on last CT on 10/15

## 2023-10-17 NOTE — SUBJECTIVE & OBJECTIVE
Interval History:  See above.    Review of Systems   Unable to perform ROS: Dementia   Constitutional:  Negative for fever.   HENT:  Negative for trouble swallowing.    Eyes:  Positive for visual disturbance.   Respiratory:  Negative for shortness of breath.    Cardiovascular:  Negative for chest pain, palpitations and leg swelling.   Gastrointestinal:  Negative for abdominal pain.   Genitourinary:  Negative for difficulty urinating.   Musculoskeletal:  Negative for gait problem.   Neurological:  Positive for headaches. Negative for weakness.   Psychiatric/Behavioral:  Positive for confusion and decreased concentration. The patient is not nervous/anxious.        Objective:     Vitals:  Temp: 99.2 °F (37.3 °C)  Pulse: 63  Rhythm: normal sinus rhythm  BP: (!) 164/72  MAP (mmHg): 103  Resp: 16  SpO2: 98 %    Temp  Min: 98.2 °F (36.8 °C)  Max: 100.1 °F (37.8 °C)  Pulse  Min: 54  Max: 152  BP  Min: 143/77  Max: 189/129  MAP (mmHg)  Min: 96  Max: 141  Resp  Min: 16  Max: 32  SpO2  Min: 91 %  Max: 98 %    10/16 0701 - 10/17 0700  In: 1556.9 [P.O.:700; I.V.:610.5]  Out: 1425 [Urine:1425]   Unmeasured Output  Urine Occurrence: 1  Stool Occurrence: 1        Physical Exam  Vitals and nursing note reviewed.   Constitutional:       Appearance: He is normal weight. He is not toxic-appearing or diaphoretic.   HENT:      Head: Normocephalic and atraumatic.      Right Ear: External ear normal.      Left Ear: External ear normal.      Nose: Nose normal.      Mouth/Throat:      Mouth: Mucous membranes are moist.      Pharynx: Oropharynx is clear.   Eyes:      Pupils: Pupils are equal, round, and reactive to light.   Cardiovascular:      Rate and Rhythm: Regular rhythm. Bradycardia present.      Heart sounds: Normal heart sounds. No murmur heard.  Pulmonary:      Effort: Pulmonary effort is normal. No respiratory distress.   Abdominal:      General: There is no distension.      Palpations: Abdomen is soft.      Tenderness: There is no  abdominal tenderness. There is no guarding.   Musculoskeletal:      Cervical back: No rigidity.      Right lower leg: No edema.      Left lower leg: No edema.   Skin:     General: Skin is warm and dry.      Capillary Refill: Capillary refill takes less than 2 seconds.   Neurological:      Comments: E3 V4 M6  Drowsy. Oriented x self and place. Mild expressive aphasia. Following commands with repeated encouragement. Emotionally labile.   CN II-XII grossly intact  RUE 4+/5  RLE 4+/5  LUE 5/5  LLE 5/5  Sensation intact. Right sided neglect      Psychiatric:         Mood and Affect: Affect is labile.            Medications:  Continuous ScheduledamLODIPine, 5 mg, Daily  heparin (porcine), 5,000 Units, Q8H  [START ON 10/18/2023] losartan, 100 mg, Daily  losartan, 50 mg, Once  memantine, 5 mg, BID  polyethylene glycol, 17 g, BID  QUEtiapine, 12.5 mg, QHS  senna-docusate 8.6-50 mg, 2 tablet, BID  silodosin, 4 mg, Daily    PRNacetaminophen, 650 mg, Q6H PRN  dextrose 10%, 12.5 g, PRN  dextrose 10%, 25 g, PRN  hydrALAZINE, 10 mg, Q4H PRN  labetalol, 10 mg, Q4H PRN  magnesium oxide, 800 mg, PRN  magnesium oxide, 800 mg, PRN  methocarbamoL, 500 mg, Q6H PRN  ondansetron, 4 mg, Q8H PRN  oxyCODONE, 5 mg, Q6H PRN  potassium bicarbonate, 35 mEq, PRN  potassium bicarbonate, 50 mEq, PRN  potassium bicarbonate, 60 mEq, PRN  potassium, sodium phosphates, 2 packet, PRN  potassium, sodium phosphates, 2 packet, PRN  potassium, sodium phosphates, 2 packet, PRN      Today I personally reviewed pertinent medications, lines/drains/airways, imaging, cardiology results, laboratory results, microbiology results, notably:    Diet  Diet Adult Regular (IDDSI Level 7) Thin  Diet Adult Regular (IDDSI Level 7) Thin

## 2023-10-17 NOTE — PROGRESS NOTES
Notified Kaci BUTLER of continued high blood pressure. BP is currently 172/79. One time dose of norvasc 5mg and 25mg hydralazine PO ordered.

## 2023-10-17 NOTE — SIGNIFICANT EVENT
0030 - Notified by nursing staff that patient with sustained HR > 150; /129  -12-lead EKG showing a-fib with RVR  -PRN labetalol given for BP control  -Metoprolol 5mg q5min PRN HR > 120 ordered    0049 - Converted back to NSR with HR 60s - no metoprolol given

## 2023-10-17 NOTE — ASSESSMENT & PLAN NOTE
Stroke risk factor  Confirmed overnight on EKG  Will need to discuss when to start anticoagulation in setting of ICH

## 2023-10-17 NOTE — ASSESSMENT & PLAN NOTE
A fib RVR overnight responding to labetalol   Avoid medications to treat HR in setting of likely tachy/la syndrome  Will need outpatient cards follow up, as appears asymptomatic without syncope, etc.

## 2023-10-17 NOTE — PLAN OF CARE
MICHAEL received call from Daniela with [facility]. She reported MD has clinical approved the Pt as he is a good candidate. They will follow but asked about his potential timeline. She will start SC with herself and Sarahi.     Advised Citlalli via SC that per team, Pt will be ready likely Thursday to Friday, potentially pending possible cards consult.     Hilda Walker LCSW  Neurocritical Care   [facility]  [id number]

## 2023-10-17 NOTE — SUBJECTIVE & OBJECTIVE
Neurologic Chief Complaint: L parieto-occipital ICH    Subjective:     Interval History: Patient is seen for follow-up neurological assessment and treatment recommendations: patient had episode of afib RVR last night which was confirmed on EKG. Patient converted back to NSR, metoprolol not given. Off cardene, requiring prn hydralazine/labetalol, dispo rehab    HPI, Past Medical, Family, and Social History remains the same as documented in the initial encounter.     Review of Systems   Constitutional:  Negative for fever.   Eyes:  Positive for visual disturbance.   Respiratory:  Negative for cough.    Neurological:  Positive for facial asymmetry and speech difficulty. Negative for weakness and numbness.     Scheduled Meds:   amLODIPine  5 mg Oral Daily    heparin (porcine)  5,000 Units Subcutaneous Q8H    losartan  50 mg Oral Daily    memantine  5 mg Oral BID    polyethylene glycol  17 g Oral BID    QUEtiapine  12.5 mg Oral QHS    senna-docusate 8.6-50 mg  2 tablet Oral BID    silodosin  4 mg Oral Daily     Continuous Infusions:      PRN Meds:acetaminophen, dextrose 10%, dextrose 10%, hydrALAZINE, labetalol, magnesium oxide, magnesium oxide, methocarbamoL, ondansetron, oxyCODONE, potassium bicarbonate, potassium bicarbonate, potassium bicarbonate, potassium, sodium phosphates, potassium, sodium phosphates, potassium, sodium phosphates    Objective:     Vital Signs (Most Recent):  Temp: 99.2 °F (37.3 °C) (10/17/23 1101)  Pulse: 61 (10/17/23 1201)  Resp: 19 (10/17/23 1201)  BP: (!) 167/78 (10/17/23 1205)  SpO2: 95 % (10/17/23 1201)  BP Location: Right arm    Vital Signs Range (Last 24H):  Temp:  [98.2 °F (36.8 °C)-100.1 °F (37.8 °C)]   Pulse:  []   Resp:  [17-32]   BP: (140-189)/()   SpO2:  [91 %-96 %]   BP Location: Right arm       Physical Exam  Vitals reviewed.   Constitutional:       General: He is not in acute distress.  HENT:      Head: Normocephalic and atraumatic.   Eyes:      General: Visual field  deficit present.   Cardiovascular:      Rate and Rhythm: Normal rate.   Pulmonary:      Effort: Pulmonary effort is normal. No respiratory distress.   Musculoskeletal:      Cervical back: No rigidity.   Skin:     General: Skin is warm and dry.   Neurological:      Mental Status: He is alert.      Cranial Nerves: Facial asymmetry present.              Neurological Exam:   LOC: alert  Attention Span: Good   Language: Expressive aphasia  Articulation: No dysarthria  Orientation: Not oriented to place, Not oriented to time  Visual Fields: Hemianopsia right  EOM (CN III, IV, VI): Gaze preference  left  Facial Movement (CN VII): Lower facial weakness on the Right  Motor: Arm left  Normal 5/5  Leg left  Normal 5/5  Arm right  Normal 5/5  Leg right Normal 5/5  Sensation: Intact to light touch, temperature and vibration    Laboratory:  CMP:   Recent Labs   Lab 10/17/23  0005   CALCIUM 9.0   ALBUMIN 3.2*   PROT 6.4      K 3.5   CO2 23      BUN 20   CREATININE 0.8   ALKPHOS 78   ALT 42   AST 67*   BILITOT 1.0       CBC:   Recent Labs   Lab 10/17/23  0005   WBC 11.52   RBC 3.96*   HGB 12.6*   HCT 37.3*      MCV 94   MCH 31.8*   MCHC 33.8       Lipid Panel:   Recent Labs   Lab 10/14/23  0404   CHOL 147   LDLCALC 87.8   HDL 38*   TRIG 106       Coagulation:   Recent Labs   Lab 10/14/23  0404   INR 1.0   APTT 22.8       Hgb A1C:   Recent Labs   Lab 10/14/23  0404   HGBA1C 5.7*       TSH:   Recent Labs   Lab 10/14/23  0404   TSH 2.782         Diagnostic Results     Brain Imaging   CTH without contrast 10/15/2023  Impression:  Stable intracranial hemorrhage (intraparenchymal intraventricular and subdural) and stable mild midline shift.  No hydrocephalus.     MRI Brain W WO contrast 10/14/2023  Impression:  Allowing for a difference in technique, similar appearance of left temporal occipital intraparenchymal hemorrhage, left subdural hemorrhage, and intraventricular extension.  No overt hydrocephalus.   No  underlying enhancing intracranial lesion.  The venous sinuses appear patent.  numerous small foci of chronic hemosiderin staining are identified within the parenchyma perhaps representing a combination of amyloid angiopathy and hypertensive chronic microhemorrhages.     CTH without contrast 10/13/2023  Impression:  1. Approximate 7 x 5 cm parenchymal hematoma centered within the left occipital lobe moderate local mass effect and extension into the occipital horn left lateral ventricle.  No hydrocephalus.  2. Left-sided subdural hematoma measuring 4 mm in thickness with mild local mass effect and 4 mm left to right midline shift.      Vessel Imaging   CTA head and neck 10/14/2023  Impression:  Redemonstration of a left parieto-occipital intra-axial hematoma, with new intraventricular extension.   Not fully detailed above, there also remains an approximately 4 mm thick subdural hematoma centered over the lateral aspect of the left cerebral convexity.   There is approximately 3 mm rightward midline shift, similar to prior.   CTA head and neck shows no evidence of high-grade stenosis, aneurysm, or large vessel occlusion in the cerebrovascular arterial circulation.   The balloon cuff of the endotracheal tube appears to distend the tracheal lumen.  Correlate clinically, consider adjustment.       Cardiac Imaging   TTE 10/14/2023    Left Ventricle: The left ventricle is normal in size. Ventricular mass is normal. Normal wall thickness. Normal wall motion. There is normal systolic function. Ejection fraction by visual approximation is 65%. There is normal diastolic function.    Left Atrium: Left atrium is mildly dilated.    Right Ventricle: Normal right ventricular cavity size. Wall thickness is normal. Right ventricle wall motion  is normal. Systolic function is normal.    Aorta: Aortic root is mildly dilated measuring 4.44 cm. Ascending aorta is mildly dilated measuring 3.8 cm.    Pulmonary Artery: The estimated pulmonary  artery systolic pressure is 28 mmHg.    IVC/SVC: Normal venous pressure at 3 mmHg.

## 2023-10-17 NOTE — PT/OT/SLP PROGRESS
Occupational Therapy      Patient Name:  Kobi Pittman   MRN:  [id number]    Patient not seen today 2/2 eating lunch on therapist 1st attempt, then with nausea/episodes of emesis on therapist's 2nd attempt. Discussed w/RN, will hold for today. Will follow-up as appropriate on next available treatment date.    10/17/2023

## 2023-10-17 NOTE — PLAN OF CARE
[facility] Care Plan    POC reviewed with Kobi Pittman and family at 1400. Pt verbalized understanding. Questions and concerns addressed. No acute events today. Pt progressing toward goals. Will continue to monitor. See below and flowsheets for full assessment and VS info.             Is this a stroke patient? yes- Stroke booklet reviewed with patient, risk factors identified for patient and stroke booklet remains at bedside for ongoing education.     Neuro:  Malick Coma Scale  Best Eye Response: 3-->(E3) to speech  Best Motor Response: 6-->(M6) obeys commands  Best Verbal Response: 4-->(V4) confused  Malick Coma Scale Score: 13  Assessment Qualifiers: patient not sedated/intubated  Pupil PERRLA: yes     24 hr Temp:  [98.2 °F (36.8 °C)-100.1 °F (37.8 °C)]     CV:   Rhythm: normal sinus rhythm  BP goals:   SBP < 160  MAP > 65    Resp:      Vent Mode: A/C  Set Rate: 12 BPM  Oxygen Concentration (%): 40  Vt Set: 450 mL  PEEP/CPAP: 5 cmH20    Plan: N/A    GI/:     Diet/Nutrition Received: regular  Last Bowel Movement: 10/17/23  Voiding Characteristics: frequency    Intake/Output Summary (Last 24 hours) at 10/17/2023 1705  Last data filed at 10/17/2023 1501  Gross per 24 hour   Intake 1224.37 ml   Output 1925 ml   Net -700.63 ml     Unmeasured Output  Urine Occurrence: 1  Stool Occurrence: 1  Emesis Occurrence: 1    Labs/Accuchecks:  Recent Labs   Lab 10/17/23  0005   WBC 11.52   RBC 3.96*   HGB 12.6*   HCT 37.3*         Recent Labs   Lab 10/17/23  0005      K 3.5   CO2 23      BUN 20   CREATININE 0.8   ALKPHOS 78   ALT 42   AST 67*   BILITOT 1.0      Recent Labs   Lab 10/14/23  0404   INR 1.0   APTT 22.8      Recent Labs   Lab 10/13/23  2210   CPK 92   TROPONINI <0.012       Electrolytes: Electrolytes replaced  Accuchecks: none    Gtts:      LDA/Wounds:  Lines/Drains/Airways       Peripheral Intravenous Line  Duration                  Peripheral IV - Single Lumen 10/17/23 0009 18 G Anterior;Left Forearm  <1 day         Peripheral IV - Single Lumen 10/17/23 0009 20 G Anterior;Right Forearm <1 day                  Wounds: No  Wound care consulted: No    Problem: Adult Inpatient Plan of Care  Goal: Optimal Comfort and Wellbeing  Outcome: Ongoing, Progressing  Goal: Readiness for Transition of Care  Outcome: Ongoing, Progressing

## 2023-10-17 NOTE — PROGRESS NOTES
[facility]  Neurocritical Care  Progress Note    Admit Date: 10/14/2023  Service Date: 10/17/2023  Length of Stay: 3    Subjective:     Chief Complaint: Nontraumatic cortical hemorrhage of left cerebral hemisphere    History of Present Illness: 74 y/o M with PMH of vascular dementia who presented to ED of [facility] with c/o confusion and difficulty seeing. Wife reports pt was in his usual state of health when he left the house at 3am to go cut grass. He returned home around 5pm, when his wife noticed he was having difficulty turning off his truck. He also reported visual changes and was unable to see any pictures on the wall. In the ED, he c/o generalized weakness and an intermittent L-sided headache x 1 week. Denied any falls or trauma to the area. He initially presented with GCS 15 and no focal deficits. CTH showed large posterior parieto-occipital intracerebral hemorrhage with 5mm shift. He was given Keppra and started on Cardene for BP control. GCS declined from 15 to 13 and pt began projectile vomiting. He was subsequently intubated. [place] [facility] [place] and will be admitted to [facility] for further eval and management.       Hospital Course: 10/14/2023 MRI yesterday suggestive of CAA. EKG with bifasicular block. Pt's wife denies any known cardiac history. Spoke with Dr. Gillies in cardiology, low level of concern considering EKG is unchanged from prior. Extubated to room air.  10/15/2023 Pt with nausea/vomiting and restlessness overnight and this AM. SBP elevated around periods of n/v requiring low dose cardene. Holding off on starting oral antihypertensives as pt was not requiring cardene yesterday. CTH stable IPH and SDH, stable MLS. Started seroquel 25 qHS. Passed SLP, started diet.  10/16/2023 Pt lethargic from seroquel this AM, reduce dose to 12.5qHS. Off of cardene since 4am. Started losartan 25. Oliguric, given  bolus and started on maintenance NS @ 50. Start SQH  tonight. Plan for step down tomorrow if BP stable overnight.   10/17/2023 Episodes of bradycardia and A fib with RVR over night with concern for tachy la syndrome. D/c fluids.         Interval History:  See above.    Review of Systems   Unable to perform ROS: Dementia   Constitutional:  Negative for fever.   HENT:  Negative for trouble swallowing.    Eyes:  Positive for visual disturbance.   Respiratory:  Negative for shortness of breath.    Cardiovascular:  Negative for chest pain, palpitations and leg swelling.   Gastrointestinal:  Negative for abdominal pain.   Genitourinary:  Negative for difficulty urinating.   Musculoskeletal:  Negative for gait problem.   Neurological:  Positive for headaches. Negative for weakness.   Psychiatric/Behavioral:  Positive for confusion and decreased concentration. The patient is not nervous/anxious.        Objective:     Vitals:  Temp: 99.2 °F (37.3 °C)  Pulse: 63  Rhythm: normal sinus rhythm  BP: (!) 164/72  MAP (mmHg): 103  Resp: 16  SpO2: 98 %    Temp  Min: 98.2 °F (36.8 °C)  Max: 100.1 °F (37.8 °C)  Pulse  Min: 54  Max: 152  BP  Min: 143/77  Max: 189/129  MAP (mmHg)  Min: 96  Max: 141  Resp  Min: 16  Max: 32  SpO2  Min: 91 %  Max: 98 %    10/16 0701 - 10/17 0700  In: 1556.9 [P.O.:700; I.V.:610.5]  Out: 1425 [Urine:1425]   Unmeasured Output  Urine Occurrence: 1  Stool Occurrence: 1        Physical Exam  Vitals and nursing note reviewed.   Constitutional:       Appearance: He is normal weight. He is not toxic-appearing or diaphoretic.   HENT:      Head: Normocephalic and atraumatic.      Right Ear: External ear normal.      Left Ear: External ear normal.      Nose: Nose normal.      Mouth/Throat:      Mouth: Mucous membranes are moist.      Pharynx: Oropharynx is clear.   Eyes:      Pupils: Pupils are equal, round, and reactive to light.   Cardiovascular:      Rate and Rhythm: Regular rhythm. Bradycardia present.      Heart sounds: Normal heart sounds. No murmur  heard.  Pulmonary:      Effort: Pulmonary effort is normal. No respiratory distress.   Abdominal:      General: There is no distension.      Palpations: Abdomen is soft.      Tenderness: There is no abdominal tenderness. There is no guarding.   Musculoskeletal:      Cervical back: No rigidity.      Right lower leg: No edema.      Left lower leg: No edema.   Skin:     General: Skin is warm and dry.      Capillary Refill: Capillary refill takes less than 2 seconds.   Neurological:      Comments: E3 V4 M6  Drowsy. Oriented x self and place. Mild expressive aphasia. Following commands with repeated encouragement. Emotionally labile.   CN II-XII grossly intact  RUE 4+/5  RLE 4+/5  LUE 5/5  LLE 5/5  Sensation intact. Right sided neglect      Psychiatric:         Mood and Affect: Affect is labile.            Medications:  Continuous ScheduledamLODIPine, 5 mg, Daily  heparin (porcine), 5,000 Units, Q8H  [START ON 10/18/2023] losartan, 100 mg, Daily  losartan, 50 mg, Once  memantine, 5 mg, BID  polyethylene glycol, 17 g, BID  QUEtiapine, 12.5 mg, QHS  senna-docusate 8.6-50 mg, 2 tablet, BID  silodosin, 4 mg, Daily    PRNacetaminophen, 650 mg, Q6H PRN  dextrose 10%, 12.5 g, PRN  dextrose 10%, 25 g, PRN  hydrALAZINE, 10 mg, Q4H PRN  labetalol, 10 mg, Q4H PRN  magnesium oxide, 800 mg, PRN  magnesium oxide, 800 mg, PRN  methocarbamoL, 500 mg, Q6H PRN  ondansetron, 4 mg, Q8H PRN  oxyCODONE, 5 mg, Q6H PRN  potassium bicarbonate, 35 mEq, PRN  potassium bicarbonate, 50 mEq, PRN  potassium bicarbonate, 60 mEq, PRN  potassium, sodium phosphates, 2 packet, PRN  potassium, sodium phosphates, 2 packet, PRN  potassium, sodium phosphates, 2 packet, PRN      Today I personally reviewed pertinent medications, lines/drains/airways, imaging, cardiology results, laboratory results, microbiology results, notably:    Diet  Diet Adult Regular (IDDSI Level 7) Thin  Diet Adult Regular (IDDSI Level 7) Thin          Assessment/Plan:     Neuro  *  Nontraumatic cortical hemorrhage of left cerebral hemisphere  72 y/o M with PMH of vascular dementia presents with L ICH with IVH from OSH. Denies home anticoagulation use and recent falls/trauma. No seizure activity noted. Repeat CTA with no interval changes, no active bleeding and no evident vessel abnormalities.   ICH 2    -Admit to [facility]  -VS/neuro checks q4h, sleep holiday w delirium precaution  - vitals, I&O q1h  -NSGY and stroke team following  -SBP goal < 160, cardene off  -MRI brain suggestive of CAA  -PT/OT/SLP    Midline shift of brain  -Neuro checks q4h     Vasogenic brain edema  -Neuro checks q4h  -NSGY consulted, signed off  -Accuchecks q6h w/ low dose SSI for glycemic control  -MRI brain w/ and w/o contrast similar appearance of left temporal occipital intraparenchymal hemorrhage, left subdural hemorrhage, and intraventricular extension. No underlying enhancing intracranial lesion. 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    Vascular dementia  -Continue home Namenda  - Seroquel 12.5 qHS    Cardiac/Vascular  Paroxysmal atrial fibrillation  A fib RVR overnight responding to labetalol   Avoid medications to treat HR in setting of likely tachy/la syndrome  Will need outpatient cards follow up, as appears asymptomatic without syncope, etc.    Palliative Care  Endotracheally intubated  Intubated at OSH due to GCS decline    -Daily ABGs  -Daily CXR  -10/14 Extubated to room air     Resolved          The patient is being Prophylaxed for:  Venous Thromboembolism with: Mechanical or Chemical  Stress Ulcer with: None  Ventilator Pneumonia with: none    Activity Orders          Diet Adult Regular (IDDSI Level 7) Thin: Regular starting at 10/15 0856    Progressive Mobility Protocol (mobilize patient to their highest level of functioning at least twice daily) starting  at 10/14 0800    Turn patient starting at 10/14 0400    Elevate HOB starting at 10/14 0206        Full Code     Level III    Carmel Emery PA-C  Neurocritical Care  [facility]

## 2023-10-17 NOTE — PT/OT/SLP PROGRESS
"Physical Therapy Treatment    Patient Name: Kobi Pittman   MRN: [id number]    Recommendations:     Discharge Recommendations: High Intensity Therapy  Discharge Equipment Recommendations: bedside commode, bath bench  Barriers to discharge: Increased level of assist    Assessment:     Kobi Pittman is a 73 y.o. male admitted with a medical diagnosis of Nontraumatic cortical hemorrhage of left cerebral hemisphere. He presents with the following impairments/functional limitations: weakness, visual deficits, impaired cognition, impaired endurance, impaired self care skills, impaired functional mobility, gait instability, impaired balance, pain, decreased safety awareness, decreased coordination. Pt with good tolerance to therapy session on this date. Pt initially resistant to participating in therapy 2/2 increased fatigue levels; pt participative in session following mild encouragement and education on importance of OOB mobility. Pt continues to require limited assist with functional mobility and is most limited by AMS and visual impairments. Pt would continue to benefit from skilled acute PT in order to address current deficits and progress functional mobility.     Rehab Prognosis: Good; patient continues to benefit from acute skilled PT services to address these deficits and reach maximum level of function.  Recent Surgery: * No surgery found *      Plan:     During this hospitalization, patient to be seen 4 x/week to address the identified rehab impairments via gait training, therapeutic activities, therapeutic exercises, neuromuscular re-education and progress toward the following goals:    Plan of Care Expires:  11/16/23    Subjective     Chief Complaint: Headache at forehead  Patient/Family Comments/Goals: "I'm so tired."  Pain/Comfort:  Pain Rating 1:  (did not rate)  Location 1:  (headache)  Pain Addressed 1: Reposition, Distraction, Cessation of Activity, Nurse notified  Pain Rating Post-Intervention 1:  (did not " rate)    Objective:     Communicated with RN prior to session. Patient found HOB elevated with pulse ox (continuous), telemetry, blood pressure cuff, oxygen upon PT entry to room.     General Precautions: Standard, fall, vision impaired  Orthopedic Precautions: N/A  Braces: N/A    Functional Mobility:  Bed Mobility:  Verbal cues for sequencing and technique  Rolling Left:  minimum assistance  Supine to Sit: minimum assistance for LE management with use of bedrail and HOB elevated  Sit to Supine: minimum assistance for LE management with HOB elevated  Transfers:    Sit to Stand: contact guard assistance with no AD with cues for hand placement  Gait: Patient ambulated 20' x 3 with seated rest breaks between trials with hand-held assist and minimum assistance.   Patient demonstrates unsteady gait, decreased step length, narrow base of support, decreased weight shift, decreased foot clearance, flexed posture, decreased kika, decreased arm swing, and shuffle gait.   Patient required cues for upright posture, gluteal activation, sequencing, increased step size, and increased foot clearance  All lines remained intact throughout ambulation trial.    AM-PAC 6 CLICK MOBILITY  Turning over in bed (including adjusting bedclothes, sheets and blankets)?: 3  Sitting down on and standing up from a chair with arms (e.g., wheelchair, bedside commode, etc.): 3  Moving from lying on back to sitting on the side of the bed?: 3  Moving to and from a bed to a chair (including a wheelchair)?: 3  Need to walk in hospital room?: 3  Climbing 3-5 steps with a railing?: 2  Basic Mobility Total Score: 17     Therapeutic Activities and Exercises:  Patient educated on role of acute care PT and PT POC, safety while in hospital including calling nurse for mobility, and call light usage  Pt educated on the effects of bed rest and the importance of OOB activity. Pt encouraged to sit UIC majority of day as tolerated and continue daily ambulation with  nursing assist. Pt verbalized understanding.  Answered all questions within PT scope of practice and addressed functional mobility concerns.    Patient left HOB elevated with all lines intact, call button in reach, RN notified, and family present.    GOALS:   Multidisciplinary Problems       Physical Therapy Goals          Problem: Physical Therapy    Goal Priority Disciplines Outcome Goal Variances Interventions   Physical Therapy Goal     PT, PT/OT Ongoing, Progressing     Description: Goals to be met by: 2023     Patient will increase functional independence with mobility by performin. Supine to sit with Stand-by Assistance  2. Sit to supine with Stand-by Assistance  3. Sit to stand transfer with Stand-by Assistance  4. Bed to chair transfer with Stand-by Assistance using LRAD  5. Gait  x 100 feet with Contact Guard Assistance using LRAD.   6. Lower extremity exercise program x15 reps per handout, with independence                         Time Tracking:     PT Received On: 10/17/23  PT Start Time: 1515     PT Stop Time: 1538  PT Total Time (min): 23 min     Billable Minutes: Gait Training 10 and Therapeutic Activity 13      Treatment Type: Treatment  PT/PTA: PT     Number of PTA visits since last PT visit: 0     10/17/2023

## 2023-10-17 NOTE — PROGRESS NOTES
Pt with systolic's 170-180's. PRN hydralazine given. BP remained elevated. Spoke to LUKE TURNER. Extra dose of 50mg Losartan ordered and future doses increased to 100mg. Will continue to monitor

## 2023-10-17 NOTE — PROGRESS NOTES
[facility] - Neuro Critical Care  Vascular Neurology  Comprehensive Stroke Center  Progress Note    Assessment/Plan:     * Nontraumatic cortical hemorrhage of left cerebral hemisphere  73yoM with PMH of vascular dementia who was transferred from [facility] with confusion and difficulty seeing around 5a.m. on 10/13. CTH left parietooccipital hemorrhage and left subdural hematoma, 3mm R MLS. CTA no LVO. MRI appears to show L temporal/occipital infarct with hemorrhagic conversion. Echo with EF 65% and mild LAE.    Overnight, patient had episode of afib RVR confirmed on EKG. Etiology of stroke likely cardioembolic. Off cardene, but continuing to require prn IV BP medications    Recommendations:  - Antithrombotics: none, contraindicated due to ICH, will need to discuss start date for anticoagulation  - Statin: atorvastatin 40 mg daily  - Risk factors include: hypertension, afib  - PT/OT/SLP/PM&R: recommending rehab  - Mechanical SCD VTE prophylaxis, sq heparin  - SBP< 140    Paroxysmal atrial fibrillation  Stroke risk factor  Confirmed overnight on EKG  Will need to discuss when to start anticoagulation in setting of ICH    Endotracheally intubated  Remains extubated since 10/14    Midline shift of brain  Noted on imaging, stable on most recent CT 10/15  Continue frequent neuro checks.     Vasogenic brain edema  -Area of cerebral edema identified when reviewing brain imaging in the Left PCA territory, there is mass effect associated with it.   -Continue frequent q1hr neuro checks as any acute changes or worsening neuro status may signify expansion of the insult and/or area of the edema, which may require acute interventions to prevent loss of function and/or death.  -Obtain stat CTH for any new or worsening neuro changes and notify primary team immediately  -stable on last CT on 10/15    Vascular dementia  Continue home namenda  Currently on seroquel nightly         10/16/2023 NAEO, neuro exam stable  although has waxing/waning. Lethargic this morning during exam, although arouses with repeated stimuli. Cardene gtt weaned off today, however required PRN hydralazine a few hours later. Started on losartan for more consistent BP control, anticipate likely step down tomorrow once BP more consistently at goal.  10/17/2023 patient had episode of afib RVR last night which was confirmed on EKG. Patient converted back to NSR, metoprolol not given. Off cardene, requiring prn hydralazine/labetalol, dispo rehab      STROKE DOCUMENTATION        NIH Scale:  1a. Level of Consciousness: 0-->Alert, keenly responsive  1b. LOC Questions: 2-->Answers neither question correctly  1c. LOC Commands: 0-->Performs both tasks correctly  2. Best Gaze: 0-->Normal  3. Visual: 2-->Complete hemianopia  4. Facial Palsy: 1-->Minor paralysis (flattened nasolabial fold, asymmetry on smiling)  5a. Motor Arm, Left: 0-->No drift, limb holds 90 (or 45) degrees for full 10 secs  5b. Motor Arm, Right: 0-->No drift, limb holds 90 (or 45) degrees for full 10 secs  6a. Motor Leg, Left: 0-->No drift, leg holds 30 degree position for full 5 secs  6b. Motor Leg, Right: 0-->No drift, leg holds 30 degree position for full 5 secs  7. Limb Ataxia: 0-->Absent  8. Sensory: 0-->Normal, no sensory loss  9. Best Language: 1-->Mild-to-moderate aphasia, some obvious loss of fluency or facility of comprehension, without significant limitation on ideas expressed or form of expression. Reduction of speech and/or comprehension, however, makes conversation. . . (see row details)  10. Dysarthria: 0-->Normal  11. Extinction and Inattention (formerly Neglect): 0-->No abnormality  Total (NIH Stroke Scale): 6       Modified [place] Score: 3  Malick Coma Scale:    ABCD2 Score:    [id number] Score:   HAS -BLED Score:   ICH Score:2  Hunt & Reese Classification:      Hemorrhagic change of an Ischemic Stroke: Does this patient have an ischemic stroke with hemorrhagic changes? Yes,  Grading Scale: PH Type 2 (PH-2) = was defined as dense hematoma > 30% of the infarcted area with substantial space-occupying effect, any extension into the intraventricular space, or any hemorrhagic lesion outside the infarcted area.  Is this a symptomatic change?  No - Hemorrhage is not clinically significant     Neurologic Chief Complaint: L parieto-occipital ICH    Subjective:     Interval History: Patient is seen for follow-up neurological assessment and treatment recommendations: patient had episode of afib RVR last night which was confirmed on EKG. Patient converted back to NSR, metoprolol not given. Off cardene, requiring prn hydralazine/labetalol, dispo rehab    HPI, Past Medical, Family, and Social History remains the same as documented in the initial encounter.     Review of Systems   Constitutional:  Negative for fever.   Eyes:  Positive for visual disturbance.   Respiratory:  Negative for cough.    Neurological:  Positive for facial asymmetry and speech difficulty. Negative for weakness and numbness.     Scheduled Meds:   amLODIPine  5 mg Oral Daily    heparin (porcine)  5,000 Units Subcutaneous Q8H    losartan  50 mg Oral Daily    memantine  5 mg Oral BID    polyethylene glycol  17 g Oral BID    QUEtiapine  12.5 mg Oral QHS    senna-docusate 8.6-50 mg  2 tablet Oral BID    silodosin  4 mg Oral Daily     Continuous Infusions:      PRN Meds:acetaminophen, dextrose 10%, dextrose 10%, hydrALAZINE, labetalol, magnesium oxide, magnesium oxide, methocarbamoL, ondansetron, oxyCODONE, potassium bicarbonate, potassium bicarbonate, potassium bicarbonate, potassium, sodium phosphates, potassium, sodium phosphates, potassium, sodium phosphates    Objective:     Vital Signs (Most Recent):  Temp: 99.2 °F (37.3 °C) (10/17/23 1101)  Pulse: 61 (10/17/23 1201)  Resp: 19 (10/17/23 1201)  BP: (!) 167/78 (10/17/23 1205)  SpO2: 95 % (10/17/23 1201)  BP Location: Right arm    Vital Signs Range (Last 24H):  Temp:  [98.2  °F (36.8 °C)-100.1 °F (37.8 °C)]   Pulse:  []   Resp:  [17-32]   BP: (140-189)/()   SpO2:  [91 %-96 %]   BP Location: Right arm       Physical Exam  Vitals reviewed.   Constitutional:       General: He is not in acute distress.  HENT:      Head: Normocephalic and atraumatic.   Eyes:      General: Visual field deficit present.   Cardiovascular:      Rate and Rhythm: Normal rate.   Pulmonary:      Effort: Pulmonary effort is normal. No respiratory distress.   Musculoskeletal:      Cervical back: No rigidity.   Skin:     General: Skin is warm and dry.   Neurological:      Mental Status: He is alert.      Cranial Nerves: Facial asymmetry present.              Neurological Exam:   LOC: alert  Attention Span: Good   Language: Expressive aphasia  Articulation: No dysarthria  Orientation: Not oriented to place, Not oriented to time  Visual Fields: Hemianopsia right  EOM (CN III, IV, VI): Gaze preference  left  Facial Movement (CN VII): Lower facial weakness on the Right  Motor: Arm left  Normal 5/5  Leg left  Normal 5/5  Arm right  Normal 5/5  Leg right Normal 5/5  Sensation: Intact to light touch, temperature and vibration    Laboratory:  CMP:   Recent Labs   Lab 10/17/23  0005   CALCIUM 9.0   ALBUMIN 3.2*   PROT 6.4      K 3.5   CO2 23      BUN 20   CREATININE 0.8   ALKPHOS 78   ALT 42   AST 67*   BILITOT 1.0       CBC:   Recent Labs   Lab 10/17/23  0005   WBC 11.52   RBC 3.96*   HGB 12.6*   HCT 37.3*      MCV 94   MCH 31.8*   MCHC 33.8       Lipid Panel:   Recent Labs   Lab 10/14/23  0404   CHOL 147   LDLCALC 87.8   HDL 38*   TRIG 106       Coagulation:   Recent Labs   Lab 10/14/23  0404   INR 1.0   APTT 22.8       Hgb A1C:   Recent Labs   Lab 10/14/23  0404   HGBA1C 5.7*       TSH:   Recent Labs   Lab 10/14/23  0404   TSH 2.782         Diagnostic Results     Brain Imaging   CTH without contrast 10/15/2023  Impression:  Stable intracranial hemorrhage (intraparenchymal intraventricular and  subdural) and stable mild midline shift.  No hydrocephalus.     MRI Brain W WO contrast 10/14/2023  Impression:  Allowing for a difference in technique, similar appearance of left temporal occipital intraparenchymal hemorrhage, left subdural hemorrhage, and intraventricular extension.  No overt hydrocephalus.   No underlying enhancing intracranial lesion.  The venous sinuses appear patent.  numerous small foci of chronic hemosiderin staining are identified within the parenchyma perhaps representing a combination of amyloid angiopathy and hypertensive chronic microhemorrhages.     CTH without contrast 10/13/2023  Impression:  1. Approximate 7 x 5 cm parenchymal hematoma centered within the left occipital lobe moderate local mass effect and extension into the occipital horn left lateral ventricle.  No hydrocephalus.  2. Left-sided subdural hematoma measuring 4 mm in thickness with mild local mass effect and 4 mm left to right midline shift.      Vessel Imaging   CTA head and neck 10/14/2023  Impression:  Redemonstration of a left parieto-occipital intra-axial hematoma, with new intraventricular extension.   Not fully detailed above, there also remains an approximately 4 mm thick subdural hematoma centered over the lateral aspect of the left cerebral convexity.   There is approximately 3 mm rightward midline shift, similar to prior.   CTA head and neck shows no evidence of high-grade stenosis, aneurysm, or large vessel occlusion in the cerebrovascular arterial circulation.   The balloon cuff of the endotracheal tube appears to distend the tracheal lumen.  Correlate clinically, consider adjustment.       Cardiac Imaging   TTE 10/14/2023    Left Ventricle: The left ventricle is normal in size. Ventricular mass is normal. Normal wall thickness. Normal wall motion. There is normal systolic function. Ejection fraction by visual approximation is 65%. There is normal diastolic function.    Left Atrium: Left atrium is mildly  dilated.    Right Ventricle: Normal right ventricular cavity size. Wall thickness is normal. Right ventricle wall motion  is normal. Systolic function is normal.    Aorta: Aortic root is mildly dilated measuring 4.44 cm. Ascending aorta is mildly dilated measuring 3.8 cm.    Pulmonary Artery: The estimated pulmonary artery systolic pressure is 28 mmHg.    IVC/SVC: Normal venous pressure at 3 mmHg.         Priyanka Mark PA-C  [facility]  Department of Vascular Neurology   [facility] [address] [facility] - Neuro Critical Care       Clofazimine Counseling:  I discussed with the patient the risks of clofazimine including but not limited to skin and eye pigmentation, liver damage, nausea/vomiting, gastrointestinal bleeding and allergy.

## 2023-10-17 NOTE — PT/OT/SLP PROGRESS
Speech Language Pathology      Kobi Pittman  MRN: [id number]    Attempted to see pt this morning, though pt deferring therapy 2/2 feeling tired/fatigued. SLP unable to return this afternoon. SLP will follow up next service date per [facility].

## 2023-10-17 NOTE — ASSESSMENT & PLAN NOTE
74 y/o M with PMH of vascular dementia presents with L ICH with IVH from OSH. Denies home anticoagulation use and recent falls/trauma. No seizure activity noted. Repeat CTA with no interval changes, no active bleeding and no evident vessel abnormalities.   ICH 2    -Admit to NCC  -VS/neuro checks q4h, sleep holiday w delirium precaution  - vitals, I&O q1h  -NSGY and stroke team following  -SBP goal < 160, cardene off  -MRI brain suggestive of CAA  -PT/OT/SLP

## 2023-10-17 NOTE — PLAN OF CARE
[facility] Care Plan    POC reviewed with Kobi Pittman and family at 0300. Pt's wife verbalized understanding. Questions and concerns addressed. Pt progressing toward goals. Will continue to monitor. See below and flowsheets for full assessment and VS info.     -Labetalol given for tachy/hypertensive episode, see note  -PRN hydralazine given to keep SBP <160  -PRN tylenol and methocarbamol given for pain  -NS at 50      Is this a stroke patient? yes- Stroke booklet reviewed with family, risk factors identified for patient and stroke booklet remains at bedside for ongoing education.     Neuro:  Mobile Coma Scale  Best Eye Response: 3-->(E3) to speech  Best Motor Response: 6-->(M6) obeys commands  Best Verbal Response: 4-->(V4) confused  Malick Coma Scale Score: 13  Assessment Qualifiers: patient not sedated/intubated  Pupil PERRLA: yes     24hr Temp:  [97.3 °F (36.3 °C)-100.1 °F (37.8 °C)]     CV:   Rhythm: sinus bradycardia  BP goals:   SBP < 160  MAP > 65    Resp:      Vent Mode: A/C  Set Rate: 12 BPM  Oxygen Concentration (%): 40  Vt Set: 450 mL  PEEP/CPAP: 5 cmH20    Plan: N/A    GI/:     Diet/Nutrition Received: regular  Last Bowel Movement: 10/13/23  Voiding Characteristics: due to void    Intake/Output Summary (Last 24 hours) at 10/17/2023 0645  Last data filed at 10/17/2023 0501  Gross per 24 hour   Intake 1556.87 ml   Output 1425 ml   Net 131.87 ml     Unmeasured Output  Stool Occurrence: 0    Labs/Accuchecks:  Recent Labs   Lab 10/17/23  0005   WBC 11.52   RBC 3.96*   HGB 12.6*   HCT 37.3*         Recent Labs   Lab 10/17/23  0005      K 3.5   CO2 23      BUN 20   CREATININE 0.8   ALKPHOS 78   ALT 42   AST 67*   BILITOT 1.0      Recent Labs   Lab 10/14/23  0404   INR 1.0   APTT 22.8      Recent Labs   Lab 10/13/23  2210   CPK 92   TROPONINI <0.012       Electrolytes: Electrolytes replaced  Accuchecks: none    Gtts:   sodium chloride 0.9% 50 mL/hr at 10/17/23 0501        LDA/Wounds:  Lines/Drains/Airways       Peripheral Intravenous Line  Duration                  Peripheral IV - Single Lumen 10/17/23 0009 18 G Anterior;Left Forearm <1 day         Peripheral IV - Single Lumen 10/17/23 0009 20 G Anterior;Right Forearm <1 day                  Wounds: No  Wound care consulted: No

## 2023-10-17 NOTE — ASSESSMENT & PLAN NOTE
-Neuro checks q4h  -NSGY consulted, signed off  -Accuchecks q6h w/ low dose SSI for glycemic control  -MRI brain w/ and w/o contrast similar appearance of left temporal occipital intraparenchymal hemorrhage, left subdural hemorrhage, and intraventricular extension. No underlying enhancing intracranial lesion. 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

## 2023-10-18 LAB
ALBUMIN SERPL BCP-MCNC: 3.3 G/DL (ref 3.5–5.2)
ALP SERPL-CCNC: 95 U/L (ref 55–135)
ALT SERPL W/O P-5'-P-CCNC: 55 U/L (ref 10–44)
ANION GAP SERPL CALC-SCNC: 11 MMOL/L (ref 8–16)
AST SERPL-CCNC: 63 U/L (ref 10–40)
BASOPHILS # BLD AUTO: 0.02 K/UL (ref 0–0.2)
BASOPHILS NFR BLD: 0.2 % (ref 0–1.9)
BILIRUB SERPL-MCNC: 1 MG/DL (ref 0.1–1)
BUN SERPL-MCNC: 19 MG/DL (ref 8–23)
CALCIUM SERPL-MCNC: 9.1 MG/DL (ref 8.7–10.5)
CHLORIDE SERPL-SCNC: 100 MMOL/L (ref 95–110)
CO2 SERPL-SCNC: 23 MMOL/L (ref 23–29)
CREAT SERPL-MCNC: 0.8 MG/DL (ref 0.5–1.4)
DIFFERENTIAL METHOD: ABNORMAL
EOSINOPHIL # BLD AUTO: 0 K/UL (ref 0–0.5)
EOSINOPHIL NFR BLD: 0.2 % (ref 0–8)
ERYTHROCYTE [DISTWIDTH] IN BLOOD BY AUTOMATED COUNT: 13.1 % (ref 11.5–14.5)
EST. GFR  (NO RACE VARIABLE): >60 ML/MIN/1.73 M^2
GLUCOSE SERPL-MCNC: 121 MG/DL (ref 70–110)
HCT VFR BLD AUTO: 39.9 % (ref 40–54)
HGB BLD-MCNC: 13.4 G/DL (ref 14–18)
IMM GRANULOCYTES # BLD AUTO: 0.04 K/UL (ref 0–0.04)
IMM GRANULOCYTES NFR BLD AUTO: 0.3 % (ref 0–0.5)
LYMPHOCYTES # BLD AUTO: 1 K/UL (ref 1–4.8)
LYMPHOCYTES NFR BLD: 8.2 % (ref 18–48)
MAGNESIUM SERPL-MCNC: 2 MG/DL (ref 1.6–2.6)
MCH RBC QN AUTO: 31.4 PG (ref 27–31)
MCHC RBC AUTO-ENTMCNC: 33.6 G/DL (ref 32–36)
MCV RBC AUTO: 93 FL (ref 82–98)
MONOCYTES # BLD AUTO: 1 K/UL (ref 0.3–1)
MONOCYTES NFR BLD: 8.2 % (ref 4–15)
NEUTROPHILS # BLD AUTO: 10 K/UL (ref 1.8–7.7)
NEUTROPHILS NFR BLD: 82.9 % (ref 38–73)
NRBC BLD-RTO: 0 /100 WBC
PHOSPHATE SERPL-MCNC: 3.7 MG/DL (ref 2.7–4.5)
PLATELET # BLD AUTO: 239 K/UL (ref 150–450)
PMV BLD AUTO: 9.8 FL (ref 9.2–12.9)
POTASSIUM SERPL-SCNC: 4 MMOL/L (ref 3.5–5.1)
PROT SERPL-MCNC: 6.8 G/DL (ref 6–8.4)
RBC # BLD AUTO: 4.27 M/UL (ref 4.6–6.2)
SODIUM SERPL-SCNC: 134 MMOL/L (ref 136–145)
WBC # BLD AUTO: 12.01 K/UL (ref 3.9–12.7)

## 2023-10-18 PROCEDURE — 63600175 PHARM REV CODE 636 W HCPCS

## 2023-10-18 PROCEDURE — 27000221 HC OXYGEN, UP TO 24 HOURS

## 2023-10-18 PROCEDURE — 99233 PR SUBSEQUENT HOSPITAL CARE,LEVL III: ICD-10-PCS | Mod: GC,,, | Performed by: PSYCHIATRY & NEUROLOGY

## 2023-10-18 PROCEDURE — 92507 TX SP LANG VOICE COMM INDIV: CPT

## 2023-10-18 PROCEDURE — 25000003 PHARM REV CODE 250

## 2023-10-18 PROCEDURE — 99900035 HC TECH TIME PER 15 MIN (STAT)

## 2023-10-18 PROCEDURE — 80053 COMPREHEN METABOLIC PANEL: CPT | Performed by: REGISTERED NURSE

## 2023-10-18 PROCEDURE — 99233 PR SUBSEQUENT HOSPITAL CARE,LEVL III: ICD-10-PCS | Mod: ,,,

## 2023-10-18 PROCEDURE — 83735 ASSAY OF MAGNESIUM: CPT | Performed by: REGISTERED NURSE

## 2023-10-18 PROCEDURE — 94761 N-INVAS EAR/PLS OXIMETRY MLT: CPT

## 2023-10-18 PROCEDURE — 25000003 PHARM REV CODE 250: Performed by: INTERNAL MEDICINE

## 2023-10-18 PROCEDURE — 99233 SBSQ HOSP IP/OBS HIGH 50: CPT | Mod: GC,,, | Performed by: PSYCHIATRY & NEUROLOGY

## 2023-10-18 PROCEDURE — 11000001 HC ACUTE MED/SURG PRIVATE ROOM

## 2023-10-18 PROCEDURE — 97535 SELF CARE MNGMENT TRAINING: CPT

## 2023-10-18 PROCEDURE — 97530 THERAPEUTIC ACTIVITIES: CPT

## 2023-10-18 PROCEDURE — 85025 COMPLETE CBC W/AUTO DIFF WBC: CPT | Performed by: REGISTERED NURSE

## 2023-10-18 PROCEDURE — 84100 ASSAY OF PHOSPHORUS: CPT | Performed by: REGISTERED NURSE

## 2023-10-18 PROCEDURE — 25000003 PHARM REV CODE 250: Performed by: REGISTERED NURSE

## 2023-10-18 PROCEDURE — 99233 SBSQ HOSP IP/OBS HIGH 50: CPT | Mod: ,,,

## 2023-10-18 RX ORDER — HYDRALAZINE HYDROCHLORIDE 50 MG/1
50 TABLET, FILM COATED ORAL EVERY 8 HOURS
Status: DISCONTINUED | OUTPATIENT
Start: 2023-10-18 | End: 2023-10-20 | Stop reason: HOSPADM

## 2023-10-18 RX ORDER — HYDRALAZINE HYDROCHLORIDE 25 MG/1
25 TABLET, FILM COATED ORAL ONCE
Status: COMPLETED | OUTPATIENT
Start: 2023-10-18 | End: 2023-10-18

## 2023-10-18 RX ADMIN — HEPARIN SODIUM 5000 UNITS: 5000 INJECTION INTRAVENOUS; SUBCUTANEOUS at 01:10

## 2023-10-18 RX ADMIN — SILODOSIN 4 MG: 4 CAPSULE ORAL at 08:10

## 2023-10-18 RX ADMIN — SENNOSIDES AND DOCUSATE SODIUM 2 TABLET: 50; 8.6 TABLET ORAL at 09:10

## 2023-10-18 RX ADMIN — LOSARTAN POTASSIUM 100 MG: 50 TABLET, FILM COATED ORAL at 08:10

## 2023-10-18 RX ADMIN — MEMANTINE HYDROCHLORIDE 5 MG: 5 TABLET ORAL at 09:10

## 2023-10-18 RX ADMIN — POLYETHYLENE GLYCOL 3350 17 G: 17 POWDER, FOR SOLUTION ORAL at 09:10

## 2023-10-18 RX ADMIN — HYDRALAZINE HYDROCHLORIDE 50 MG: 50 TABLET ORAL at 09:10

## 2023-10-18 RX ADMIN — MEMANTINE HYDROCHLORIDE 5 MG: 5 TABLET ORAL at 08:10

## 2023-10-18 RX ADMIN — SENNOSIDES AND DOCUSATE SODIUM 2 TABLET: 50; 8.6 TABLET ORAL at 08:10

## 2023-10-18 RX ADMIN — POLYETHYLENE GLYCOL 3350 17 G: 17 POWDER, FOR SOLUTION ORAL at 08:10

## 2023-10-18 RX ADMIN — AMLODIPINE BESYLATE 10 MG: 10 TABLET ORAL at 08:10

## 2023-10-18 RX ADMIN — QUETIAPINE FUMARATE 12.5 MG: 25 TABLET ORAL at 09:10

## 2023-10-18 RX ADMIN — HEPARIN SODIUM 5000 UNITS: 5000 INJECTION INTRAVENOUS; SUBCUTANEOUS at 06:10

## 2023-10-18 RX ADMIN — HYDRALAZINE HYDROCHLORIDE 50 MG: 50 TABLET ORAL at 06:10

## 2023-10-18 RX ADMIN — ACETAMINOPHEN 650 MG: 325 TABLET ORAL at 03:10

## 2023-10-18 RX ADMIN — HEPARIN SODIUM 5000 UNITS: 5000 INJECTION INTRAVENOUS; SUBCUTANEOUS at 09:10

## 2023-10-18 RX ADMIN — ACETAMINOPHEN 650 MG: 325 TABLET ORAL at 09:10

## 2023-10-18 RX ADMIN — HYDRALAZINE HYDROCHLORIDE 10 MG: 20 INJECTION, SOLUTION INTRAMUSCULAR; INTRAVENOUS at 12:10

## 2023-10-18 RX ADMIN — HYDRALAZINE HYDROCHLORIDE 25 MG: 25 TABLET, FILM COATED ORAL at 01:10

## 2023-10-18 RX ADMIN — HYDRALAZINE HYDROCHLORIDE 50 MG: 50 TABLET ORAL at 01:10

## 2023-10-18 NOTE — PT/OT/SLP PROGRESS
Occupational Therapy   Treatment    Name: Kobi Pittman  MRN: [id number]  Admitting Diagnosis:  Nontraumatic cortical hemorrhage of left cerebral hemisphere       Recommendations:     Discharge Recommendations: High Intensity Therapy  Discharge Equipment Recommendations:  bedside commode, bath bench  Barriers to discharge:  None    Assessment:     Kobi Pittman is a 73 y.o. male with a medical diagnosis of Nontraumatic cortical hemorrhage of left cerebral hemisphere.  He presents with decrease functional status secondary to medical diagnosis. Performance deficits affecting function are weakness, impaired cognition, impaired self care skills, impaired functional mobility, gait instability, impaired balance, pain, decreased safety awareness, decreased coordination. Patient session focused on compensatory strategies due to patient visual impairment. Patient demonstrated good functional mobility needing CGA for ambulation in hospital room.     Rehab Prognosis:  Good; patient would benefit from acute skilled OT services to address these deficits and reach maximum level of function.       Plan:     Patient to be seen 4 x/week to address the above listed problems via self-care/home management, therapeutic activities, therapeutic exercises, neuromuscular re-education  Plan of Care Expires: 11/16/23  Plan of Care Reviewed with: patient, spouse, daughter    Subjective     Chief Complaint: None   Patient/Family Comments/goals: D/C   Pain/Comfort:  Pain Rating 1: 0/10    Objective:     Communicated with: RN prior to session.  Patient found supine with pulse ox (continuous), telemetry, blood pressure cuff, oxygen upon OT entry to room.    General Precautions: Standard, fall, vision impaired    Orthopedic Precautions:N/A  Braces: N/A  Respiratory Status: Room air     Occupational Performance:     Bed Mobility:    Patient completed Rolling/Turning to Left with  stand by assistance  Patient completed Scooting/Bridging with stand by  assistance  Patient completed Supine to Sit with stand by assistance    Functional Mobility/Transfers:  Patient completed Sit <> Stand Transfer with contact guard assistance  with  no assistive device   Functional Mobility: Patient ambulated throughout room with CGA and no AD       AMPAC 6 Click ADL: 17    Treatment & Education:  While ambulating in room patient completed visual exercises with emphasis on compensatory strategies for R visual field cut. Patient asked to identify items placed to his R while ambulating to encourage R sided increased awareness. Patient then stood in front of window and asked to search for a visual target; patient with increased difficulty in task due to cognition and visual field impairment.     Patient left up in chair with all lines intact, call button in reach, chair alarm on, and family present    GOALS:   Multidisciplinary Problems       Occupational Therapy Goals          Problem: Occupational Therapy    Goal Priority Disciplines Outcome Interventions   Occupational Therapy Goal     OT, PT/OT Ongoing, Progressing    Description: Goals to be met by: 11/16/23     Patient will increase functional independence with ADLs by performing:    UE Dressing with Minimal Assistance.  LE Dressing with Minimal Assistance.  Grooming while standing with [place].  Toileting from toilet with Minimal Assistance for hygiene and clothing management.   Bathing from  shower chair/bench with Minimal Assistance.                         Time Tracking:     OT Date of Treatment: 10/18/23  OT Start Time: 1405  OT Stop Time: 1438  OT Total Time (min): 33 min    Billable Minutes:Therapeutic Activity 33 minutes     OT/CONCHITA: OT          10/18/2023

## 2023-10-18 NOTE — SUBJECTIVE & OBJECTIVE
Neurologic Chief Complaint: L parieto-occipital ICH    Subjective:     Interval History: Patient is seen for follow-up neurological assessment and treatment recommendations:     Neuro exam stable. Required hydralazine PRN once this morning. Pending step down.     HPI, Past Medical, Family, and Social History remains the same as documented in the initial encounter.     Review of Systems   Constitutional:  Negative for fever.   Eyes:  Positive for visual disturbance.   Respiratory:  Negative for cough.    Neurological:  Positive for facial asymmetry and speech difficulty. Negative for weakness and numbness.     Scheduled Meds:   amLODIPine  10 mg Oral Daily    heparin (porcine)  5,000 Units Subcutaneous Q8H    hydrALAZINE  50 mg Oral Q8H    losartan  100 mg Oral Daily    memantine  5 mg Oral BID    polyethylene glycol  17 g Oral BID    QUEtiapine  12.5 mg Oral QHS    senna-docusate 8.6-50 mg  2 tablet Oral BID    silodosin  4 mg Oral Daily     Continuous Infusions:      PRN Meds:acetaminophen, dextrose 10%, dextrose 10%, hydrALAZINE, magnesium oxide, magnesium oxide, methocarbamoL, ondansetron, oxyCODONE, potassium bicarbonate, potassium bicarbonate, potassium bicarbonate, potassium, sodium phosphates, potassium, sodium phosphates, potassium, sodium phosphates    Objective:     Vital Signs (Most Recent):  Temp: 98.6 °F (37 °C) (10/18/23 0705)  Pulse: 71 (10/18/23 1100)  Resp: 16 (10/18/23 1100)  BP: (!) 166/83 (10/18/23 1100)  SpO2: 95 % (10/18/23 1100)  BP Location: Right arm    Vital Signs Range (Last 24H):  Temp:  [98.6 °F (37 °C)-100.5 °F (38.1 °C)]   Pulse:  [60-91]   Resp:  [16-27]   BP: (151-184)/(64-94)   SpO2:  [92 %-98 %]   BP Location: Right arm       Physical Exam  Vitals reviewed.   Constitutional:       General: He is not in acute distress.  HENT:      Head: Normocephalic and atraumatic.   Eyes:      General: Visual field deficit present.   Cardiovascular:      Rate and Rhythm: Normal rate.   Pulmonary:       Effort: Pulmonary effort is normal. No respiratory distress.   Musculoskeletal:      Cervical back: No rigidity.   Skin:     General: Skin is warm and dry.   Neurological:      Mental Status: He is alert.      Cranial Nerves: Facial asymmetry present.         Neurological Exam:   LOC: alert  Attention Span: Good   Language: Expressive aphasia  Articulation: No dysarthria  Orientation: Not oriented to place, Not oriented to time  Visual Fields: Hemianopsia right  EOM (CN III, IV, VI): Gaze preference  left  Facial Movement (CN VII): Lower facial weakness on the Right  Motor: Arm left  Normal 5/5  Leg left  Normal 5/5  Arm right  Normal 5/5  Leg right Normal 5/5  Sensation: Intact to light touch, temperature and vibration    Laboratory:  CMP:   Recent Labs   Lab 10/18/23  0021   CALCIUM 9.1   ALBUMIN 3.3*   PROT 6.8   *   K 4.0   CO2 23      BUN 19   CREATININE 0.8   ALKPHOS 95   ALT 55*   AST 63*   BILITOT 1.0       CBC:   Recent Labs   Lab 10/18/23  0021   WBC 12.01   RBC 4.27*   HGB 13.4*   HCT 39.9*      MCV 93   MCH 31.4*   MCHC 33.6       Lipid Panel:   Recent Labs   Lab 10/14/23  0404   CHOL 147   LDLCALC 87.8   HDL 38*   TRIG 106       Coagulation:   Recent Labs   Lab 10/14/23  0404   INR 1.0   APTT 22.8       Hgb A1C:   Recent Labs   Lab 10/14/23  0404   HGBA1C 5.7*       TSH:   Recent Labs   Lab 10/14/23  0404   TSH 2.782         Diagnostic Results     Brain Imaging   CTH without contrast 10/15/2023  Impression:  Stable intracranial hemorrhage (intraparenchymal intraventricular and subdural) and stable mild midline shift.  No hydrocephalus.     MRI Brain W WO contrast 10/14/2023  Impression:  Allowing for a difference in technique, similar appearance of left temporal occipital intraparenchymal hemorrhage, left subdural hemorrhage, and intraventricular extension.  No overt hydrocephalus.   No underlying enhancing intracranial lesion.  The venous sinuses appear patent.  numerous small  foci of chronic hemosiderin staining are identified within the parenchyma perhaps representing a combination of amyloid angiopathy and hypertensive chronic microhemorrhages.     CTH without contrast 10/13/2023  Impression:  1. Approximate 7 x 5 cm parenchymal hematoma centered within the left occipital lobe moderate local mass effect and extension into the occipital horn left lateral ventricle.  No hydrocephalus.  2. Left-sided subdural hematoma measuring 4 mm in thickness with mild local mass effect and 4 mm left to right midline shift.      Vessel Imaging   CTA head and neck 10/14/2023  Impression:  Redemonstration of a left parieto-occipital intra-axial hematoma, with new intraventricular extension.   Not fully detailed above, there also remains an approximately 4 mm thick subdural hematoma centered over the lateral aspect of the left cerebral convexity.   There is approximately 3 mm rightward midline shift, similar to prior.   CTA head and neck shows no evidence of high-grade stenosis, aneurysm, or large vessel occlusion in the cerebrovascular arterial circulation.   The balloon cuff of the endotracheal tube appears to distend the tracheal lumen.  Correlate clinically, consider adjustment.       Cardiac Imaging   TTE 10/14/2023    Left Ventricle: The left ventricle is normal in size. Ventricular mass is normal. Normal wall thickness. Normal wall motion. There is normal systolic function. Ejection fraction by visual approximation is 65%. There is normal diastolic function.    Left Atrium: Left atrium is mildly dilated.    Right Ventricle: Normal right ventricular cavity size. Wall thickness is normal. Right ventricle wall motion  is normal. Systolic function is normal.    Aorta: Aortic root is mildly dilated measuring 4.44 cm. Ascending aorta is mildly dilated measuring 3.8 cm.    Pulmonary Artery: The estimated pulmonary artery systolic pressure is 28 mmHg.    IVC/SVC: Normal venous pressure at 3 mmHg.

## 2023-10-18 NOTE — ASSESSMENT & PLAN NOTE
Stroke risk factor  Confirmed on EKG  Will need to discuss when to start anticoagulation in setting of ICH

## 2023-10-18 NOTE — ASSESSMENT & PLAN NOTE
74 y/o M with PMH of vascular dementia presents with L ICH with IVH from OSH. Denies home anticoagulation use and recent falls/trauma. No seizure activity noted. Repeat CTA with no interval changes, no active bleeding and no evident vessel abnormalities.   ICH 2    -Admit to NCC  -VS/neuro checks q4h, sleep holiday w delirium precaution  - vitals, I&O q1h  -NSGY and stroke team following  -SBP goal < 160, cardene off  -MRI brain suggestive of CAA  -PT/OT/SLP    Stable for stroke step down

## 2023-10-18 NOTE — ASSESSMENT & PLAN NOTE
73yoM with PMH of vascular dementia who was transferred from [facility] with confusion and difficulty seeing around 5a.m. on 10/13. CTH left parietooccipital hemorrhage and left subdural hematoma, 3mm R MLS. CTA no LVO. MRI appears to show L temporal/occipital infarct with hemorrhagic conversion. Echo with EF 65% and mild LAE.    Patient had episode of afib RVR confirmed on EKG. Etiology of stroke likely cardioembolic. Required 1 PRN for BP. Stable for step down. Dispo is rehab.     Recommendations:  - Antithrombotics: none, contraindicated due to ICH, will need to discuss start date for anticoagulation  - Statin: atorvastatin 40 mg daily  - Risk factors include: hypertension, afib  - PT/OT/SLP/PM&R: recommending rehab  - Mechanical SCD VTE prophylaxis, sq heparin  - SBP< 140

## 2023-10-18 NOTE — ASSESSMENT & PLAN NOTE
A fib RVR overnight responding to labetalol   Avoid medications to treat HR in setting of likely tachy/la syndrome  Will need outpatient cards follow up, as appears asymptomatic without syncope, etc.    ? Stroke 2/2 A fib versus hemorrhagic conversion in setting of possible CAA  Ongoing discussion regarding AC

## 2023-10-18 NOTE — PROGRESS NOTES
[facility] - Neuro Critical Care  Vascular Neurology  Comprehensive Stroke Center  Progress Note    Assessment/Plan:     * Nontraumatic cortical hemorrhage of left cerebral hemisphere  73yoM with PMH of vascular dementia who was transferred from [facility] with confusion and difficulty seeing around 5a.m. on 10/13. CTH left parietooccipital hemorrhage and left subdural hematoma, 3mm R MLS. CTA no LVO. MRI appears to show L temporal/occipital infarct with hemorrhagic conversion. Echo with EF 65% and mild LAE.    Patient had episode of afib RVR confirmed on EKG. Etiology of stroke likely cardioembolic. Required 1 PRN for BP. Stable for step down. Dispo is rehab.     Recommendations:  - Antithrombotics: none, contraindicated due to ICH, will need to discuss start date for anticoagulation  - Statin: atorvastatin 40 mg daily  - Risk factors include: hypertension, afib  - PT/OT/SLP/PM&R: recommending rehab  - Mechanical SCD VTE prophylaxis, sq heparin  - SBP< 140    Paroxysmal atrial fibrillation  Stroke risk factor  Confirmed on EKG  Will need to discuss when to start anticoagulation in setting of ICH    Endotracheally intubated  Remains extubated since 10/14    Midline shift of brain  Noted on imaging, stable on most recent CT 10/15  Continue frequent neuro checks.     Vasogenic brain edema  -Area of cerebral edema identified when reviewing brain imaging in the Left PCA territory, there is mass effect associated with it.   -Continue frequent q1hr neuro checks as any acute changes or worsening neuro status may signify expansion of the insult and/or area of the edema, which may require acute interventions to prevent loss of function and/or death.  -Obtain stat CTH for any new or worsening neuro changes and notify primary team immediately  -stable on last CT on 10/15    Vascular dementia  Continue home namenda  Currently on seroquel nightly         10/16/2023 NAEO, neuro exam stable although has  waxing/waning. Lethargic this morning during exam, although arouses with repeated stimuli. Cardene gtt weaned off today, however required PRN hydralazine a few hours later. Started on losartan for more consistent BP control, anticipate likely step down tomorrow once BP more consistently at goal.  10/17/2023 patient had episode of afib RVR last night which was confirmed on EKG. Patient converted back to NSR, metoprolol not given. Off cardene, requiring prn hydralazine/labetalol, dispo rehab  10/18/2023 neuro exam stable, pending stepdown.       STROKE DOCUMENTATION        NIH Scale:  1a. Level of Consciousness: 0-->Alert, keenly responsive  1b. LOC Questions: 2-->Answers neither question correctly  1c. LOC Commands: 0-->Performs both tasks correctly  2. Best Gaze: 0-->Normal  3. Visual: 2-->Complete hemianopia  4. Facial Palsy: 1-->Minor paralysis (flattened nasolabial fold, asymmetry on smiling)  5a. Motor Arm, Left: 0-->No drift, limb holds 90 (or 45) degrees for full 10 secs  5b. Motor Arm, Right: 0-->No drift, limb holds 90 (or 45) degrees for full 10 secs  6a. Motor Leg, Left: 0-->No drift, leg holds 30 degree position for full 5 secs  6b. Motor Leg, Right: 0-->No drift, leg holds 30 degree position for full 5 secs  7. Limb Ataxia: 0-->Absent  8. Sensory: 0-->Normal, no sensory loss  9. Best Language: 1-->Mild-to-moderate aphasia, some obvious loss of fluency or facility of comprehension, without significant limitation on ideas expressed or form of expression. Reduction of speech and/or comprehension, however, makes conversation. . . (see row details)  10. Dysarthria: 0-->Normal  11. Extinction and Inattention (formerly Neglect): 0-->No abnormality  Total (NIH Stroke Scale): 6       Modified Pen[place] Score: 3  [place] Coma Scale:    ABCD2 Score:    [id number] Score:   HAS -BLED Score:   ICH Score:2  Hunt & Reese Classification:      Hemorrhagic change of an Ischemic Stroke: Does this patient have an ischemic  stroke with hemorrhagic changes? Yes, Grading Scale: PH Type 2 (PH-2) = was defined as dense hematoma > 30% of the infarcted area with substantial space-occupying effect, any extension into the intraventricular space, or any hemorrhagic lesion outside the infarcted area.  Is this a symptomatic change?  No - Hemorrhage is not clinically significant     Neurologic Chief Complaint: L parieto-occipital ICH    Subjective:     Interval History: Patient is seen for follow-up neurological assessment and treatment recommendations:     Neuro exam stable. Required hydralazine PRN once this morning. Pending step down.     HPI, Past Medical, Family, and Social History remains the same as documented in the initial encounter.     Review of Systems   Constitutional:  Negative for fever.   Eyes:  Positive for visual disturbance.   Respiratory:  Negative for cough.    Neurological:  Positive for facial asymmetry and speech difficulty. Negative for weakness and numbness.     Scheduled Meds:   amLODIPine  10 mg Oral Daily    heparin (porcine)  5,000 Units Subcutaneous Q8H    hydrALAZINE  50 mg Oral Q8H    losartan  100 mg Oral Daily    memantine  5 mg Oral BID    polyethylene glycol  17 g Oral BID    QUEtiapine  12.5 mg Oral QHS    senna-docusate 8.6-50 mg  2 tablet Oral BID    silodosin  4 mg Oral Daily     Continuous Infusions:      PRN Meds:acetaminophen, dextrose 10%, dextrose 10%, hydrALAZINE, magnesium oxide, magnesium oxide, methocarbamoL, ondansetron, oxyCODONE, potassium bicarbonate, potassium bicarbonate, potassium bicarbonate, potassium, sodium phosphates, potassium, sodium phosphates, potassium, sodium phosphates    Objective:     Vital Signs (Most Recent):  Temp: 98.6 °F (37 °C) (10/18/23 0705)  Pulse: 71 (10/18/23 1100)  Resp: 16 (10/18/23 1100)  BP: (!) 166/83 (10/18/23 1100)  SpO2: 95 % (10/18/23 1100)  BP Location: Right arm    Vital Signs Range (Last 24H):  Temp:  [98.6 °F (37 °C)-100.5 °F (38.1 °C)]   Pulse:   [60-91]   Resp:  [16-27]   BP: (151-184)/(64-94)   SpO2:  [92 %-98 %]   BP Location: Right arm       Physical Exam  Vitals reviewed.   Constitutional:       General: He is not in acute distress.  HENT:      Head: Normocephalic and atraumatic.   Eyes:      General: Visual field deficit present.   Cardiovascular:      Rate and Rhythm: Normal rate.   Pulmonary:      Effort: Pulmonary effort is normal. No respiratory distress.   Musculoskeletal:      Cervical back: No rigidity.   Skin:     General: Skin is warm and dry.   Neurological:      Mental Status: He is alert.      Cranial Nerves: Facial asymmetry present.         Neurological Exam:   LOC: alert  Attention Span: Good   Language: Expressive aphasia  Articulation: No dysarthria  Orientation: Not oriented to place, Not oriented to time  Visual Fields: Hemianopsia right  EOM (CN III, IV, VI): Gaze preference  left  Facial Movement (CN VII): Lower facial weakness on the Right  Motor: Arm left  Normal 5/5  Leg left  Normal 5/5  Arm right  Normal 5/5  Leg right Normal 5/5  Sensation: Intact to light touch, temperature and vibration    Laboratory:  CMP:   Recent Labs   Lab 10/18/23  0021   CALCIUM 9.1   ALBUMIN 3.3*   PROT 6.8   *   K 4.0   CO2 23      BUN 19   CREATININE 0.8   ALKPHOS 95   ALT 55*   AST 63*   BILITOT 1.0       CBC:   Recent Labs   Lab 10/18/23  0021   WBC 12.01   RBC 4.27*   HGB 13.4*   HCT 39.9*      MCV 93   MCH 31.4*   MCHC 33.6       Lipid Panel:   Recent Labs   Lab 10/14/23  0404   CHOL 147   LDLCALC 87.8   HDL 38*   TRIG 106       Coagulation:   Recent Labs   Lab 10/14/23  0404   INR 1.0   APTT 22.8       Hgb A1C:   Recent Labs   Lab 10/14/23  0404   HGBA1C 5.7*       TSH:   Recent Labs   Lab 10/14/23  0404   TSH 2.782         Diagnostic Results     Brain Imaging   CTH without contrast 10/15/2023  Impression:  Stable intracranial hemorrhage (intraparenchymal intraventricular and subdural) and stable mild midline shift.  No  hydrocephalus.     MRI Brain W WO contrast 10/14/2023  Impression:  Allowing for a difference in technique, similar appearance of left temporal occipital intraparenchymal hemorrhage, left subdural hemorrhage, and intraventricular extension.  No overt hydrocephalus.   No underlying enhancing intracranial lesion.  The venous sinuses appear patent.  numerous small foci of chronic hemosiderin staining are identified within the parenchyma perhaps representing a combination of amyloid angiopathy and hypertensive chronic microhemorrhages.     CTH without contrast 10/13/2023  Impression:  1. Approximate 7 x 5 cm parenchymal hematoma centered within the left occipital lobe moderate local mass effect and extension into the occipital horn left lateral ventricle.  No hydrocephalus.  2. Left-sided subdural hematoma measuring 4 mm in thickness with mild local mass effect and 4 mm left to right midline shift.      Vessel Imaging   CTA head and neck 10/14/2023  Impression:  Redemonstration of a left parieto-occipital intra-axial hematoma, with new intraventricular extension.   Not fully detailed above, there also remains an approximately 4 mm thick subdural hematoma centered over the lateral aspect of the left cerebral convexity.   There is approximately 3 mm rightward midline shift, similar to prior.   CTA head and neck shows no evidence of high-grade stenosis, aneurysm, or large vessel occlusion in the cerebrovascular arterial circulation.   The balloon cuff of the endotracheal tube appears to distend the tracheal lumen.  Correlate clinically, consider adjustment.       Cardiac Imaging   TTE 10/14/2023    Left Ventricle: The left ventricle is normal in size. Ventricular mass is normal. Normal wall thickness. Normal wall motion. There is normal systolic function. Ejection fraction by visual approximation is 65%. There is normal diastolic function.    Left Atrium: Left atrium is mildly dilated.    Right Ventricle: Normal right  ventricular cavity size. Wall thickness is normal. Right ventricle wall motion  is normal. Systolic function is normal.    Aorta: Aortic root is mildly dilated measuring 4.44 cm. Ascending aorta is mildly dilated measuring 3.8 cm.    Pulmonary Artery: The estimated pulmonary artery systolic pressure is 28 mmHg.    IVC/SVC: Normal venous pressure at 3 mmHg.         Louisa Barba MD  [facility]  Department of Vascular Neurology   [facility] - Neuro Critical Care

## 2023-10-18 NOTE — PROVIDER TRANSFER
Step down approved by stroke team and conversation held with LUKE Sandoval. Transfer orders placed.

## 2023-10-18 NOTE — PROGRESS NOTES
[facility] - Neuro Critical Care  Neurocritical Care  Progress Note    Admit Date: 10/14/2023  Service Date: 10/18/2023  Length of Stay: 4    Subjective:     Chief Complaint: Nontraumatic cortical hemorrhage of left cerebral hemisphere    History of Present Illness: 74 y/o M with PMH of vascular dementia who presented to ED of [facility] with c/o confusion and difficulty seeing. Wife reports pt was in his usual state of health when he left the house at 3am to go cut grass. He returned home around 5pm, when his wife noticed he was having difficulty turning off his truck. He also reported visual changes and was unable to see any pictures on the wall. In the ED, he c/o generalized weakness and an intermittent L-sided headache x 1 week. Denied any falls or trauma to the area. He initially presented with GCS 15 and no focal deficits. CTH showed large posterior parieto-occipital intracerebral hemorrhage with 5mm shift. He was given Keppra and started on Cardene for BP control. GCS declined from 15 to 13 and pt began projectile vomiting. He was subsequently intubated. Transfer[place] [facility] [place] level [place] care and will be admitted to [facility] for further eval and management.       Hospital Course: 10/14/2023 MRI yesterday suggestive of CAA. EKG with bifasicular block. Pt's wife denies any known cardiac history. Spoke with Dr. Gillies in cardiology, low level of concern considering EKG is unchanged from prior. Extubated to room air.  10/15/2023 Pt with nausea/vomiting and restlessness overnight and this AM. SBP elevated around periods of n/v requiring low dose cardene. Holding off on starting oral antihypertensives as pt was not requiring cardene yesterday. CTH stable IPH and SDH, stable MLS. Started seroquel 25 qHS. Passed SLP, started diet.  10/16/2023 Pt lethargic from seroquel this AM, reduce dose to 12.5qHS. Off of cardene since 4am. Started losartan 25. Oliguric, given  bolus and started on maintenance NS @ 50. Start SQH  tonight. Plan for step down tomorrow if BP stable overnight.   10/17/2023 Episodes of bradycardia and A fib with RVR over night with concern for tachy la syndrome. D/c fluids.   10/18/2023 Hydralazine initiated yesterday and increased overnight. Cardene off with no prn use x 12 hours. Stable for stroke step down.         Interval History:  NAEON.     Review of Systems   Unable to perform ROS: Dementia   Constitutional:  Negative for fever.   HENT:  Negative for trouble swallowing.    Eyes:  Negative for visual disturbance.   Respiratory:  Negative for shortness of breath.    Cardiovascular:  Negative for chest pain, palpitations and leg swelling.   Gastrointestinal:  Negative for abdominal pain.   Genitourinary:  Negative for difficulty urinating.   Musculoskeletal:  Negative for gait problem.   Neurological:  Negative for weakness and headaches.   Psychiatric/Behavioral:  Negative for confusion and decreased concentration. The patient is not nervous/anxious.        Objective:     Vitals:  Temp: 98.6 °F (37 °C)  Pulse: 80  Rhythm: normal sinus rhythm  BP: (!) 155/82  MAP (mmHg): 112  Resp: (!) 27  SpO2: 95 %    Temp  Min: 98.6 °F (37 °C)  Max: 100.5 °F (38.1 °C)  Pulse  Min: 60  Max: 91  BP  Min: 151/86  Max: 184/85  MAP (mmHg)  Min: 92  Max: 128  Resp  Min: 16  Max: 27  SpO2  Min: 92 %  Max: 98 %    10/17 0701 - 10/18 0700  In: 1020 [P.O.:820; I.V.:200]  Out: 2840 [Urine:2840]   Unmeasured Output  Urine Occurrence: 1  Stool Occurrence: 1  Emesis Occurrence: 1  Pad Count: 1        Physical Exam  Vitals and nursing note reviewed.   Constitutional:       Appearance: He is normal weight. He is not toxic-appearing or diaphoretic.   HENT:      Head: Normocephalic and atraumatic.      Right Ear: External ear normal.      Left Ear: External ear normal.      Nose: Nose normal.      Mouth/Throat:      Mouth: Mucous membranes are moist.      Pharynx: Oropharynx is clear.   Eyes:      Pupils: Pupils are equal, round, and  reactive to light.   Cardiovascular:      Rate and Rhythm: Regular rhythm. Bradycardia present.      Heart sounds: Normal heart sounds. No murmur heard.  Pulmonary:      Effort: Pulmonary effort is normal. No respiratory distress.   Abdominal:      General: There is no distension.      Palpations: Abdomen is soft.      Tenderness: There is no abdominal tenderness. There is no guarding.   Musculoskeletal:      Cervical back: No rigidity.      Right lower leg: No edema.      Left lower leg: No edema.   Skin:     General: Skin is warm and dry.      Capillary Refill: Capillary refill takes less than 2 seconds.   Neurological:      Comments: E3 V4 M6  Drowsy. Oriented x self and place. Mild expressive aphasia. Following commands with repeated encouragement. Emotionally labile.   CN II-XII grossly intact  RUE 4+/5  RLE 4+/5  LUE 5/5  LLE 5/5  Sensation intact. Right sided neglect      Psychiatric:         Mood and Affect: Affect is labile.       \       Medications:  Continuous ScheduledamLODIPine, 10 mg, Daily  heparin (porcine), 5,000 Units, Q8H  hydrALAZINE, 50 mg, Q8H  losartan, 100 mg, Daily  memantine, 5 mg, BID  polyethylene glycol, 17 g, BID  QUEtiapine, 12.5 mg, QHS  senna-docusate 8.6-50 mg, 2 tablet, BID  silodosin, 4 mg, Daily    PRNacetaminophen, 650 mg, Q6H PRN  dextrose 10%, 12.5 g, PRN  dextrose 10%, 25 g, PRN  hydrALAZINE, 10 mg, Q4H PRN  magnesium oxide, 800 mg, PRN  magnesium oxide, 800 mg, PRN  methocarbamoL, 500 mg, Q6H PRN  ondansetron, 4 mg, Q8H PRN  oxyCODONE, 5 mg, Q6H PRN  potassium bicarbonate, 35 mEq, PRN  potassium bicarbonate, 50 mEq, PRN  potassium bicarbonate, 60 mEq, PRN  potassium, sodium phosphates, 2 packet, PRN  potassium, sodium phosphates, 2 packet, PRN  potassium, sodium phosphates, 2 packet, PRN      Today I personally reviewed pertinent medications, lines/drains/airways, imaging, cardiology results, laboratory results, microbiology results, notably:    Diet  Diet Adult Regular  (IDDSI Level 7) Thin  Diet Adult Regular (IDDSI Level 7) Thin          Assessment/Plan:     Neuro  * Nontraumatic cortical hemorrhage of left cerebral hemisphere  74 y/o M with PMH of vascular dementia presents with L ICH with IVH from OSH. Denies home anticoagulation use and recent falls/trauma. No seizure activity noted. Repeat CTA with no interval changes, no active bleeding and no evident vessel abnormalities.   ICH 2    -Admit to NCC  -VS/neuro checks q4h, sleep holiday w delirium precaution  - vitals, I&O q1h  -NSGY and stroke team following  -SBP goal < 160, cardene off  -MRI brain suggestive of CAA  -PT/OT/SLP    Stable for stroke step down    Midline shift of brain  -Neuro checks q4h     Vasogenic brain edema  -Neuro checks q4h  -NSGY consulted, signed off  -Accuchecks q6h w/ low dose SSI for glycemic control  -MRI brain w/ and w/o contrast similar appearance of left temporal occipital intraparenchymal hemorrhage, left subdural hemorrhage, and intraventricular extension. No underlying enhancing intracranial lesion. 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    Vascular dementia  -Continue home Namenda  -Seroquel 12.5 qHS, daily EKG    Cardiac/Vascular  Paroxysmal atrial fibrillation  A fib RVR overnight responding to labetalol   Avoid medications to treat HR in setting of likely tachy/la syndrome  Will need outpatient cards follow up, as appears asymptomatic without syncope, etc.    ? Stroke 2/2 A fib versus hemorrhagic conversion in setting of possible CAA  Ongoing discussion regarding AC          The patient is being Prophylaxed for:  Venous Thromboembolism with: Mechanical or Chemical  Stress Ulcer with: None  Ventilator Pneumonia with: none    Activity Orders          Diet Adult Regular (IDDSI Level 7) Thin: Regular starting at 10/15 0856    Progressive  Mobility Protocol (mobilize patient to their highest level of functioning at least twice daily) starting at 10/14 0800    Turn patient starting at 10/14 0400    Elevate HOB starting at 10/14 0206        Full Code     Level III    Carmel Emery PA-C  Neurocritical Care  [facility] - Neuro Critical Care

## 2023-10-18 NOTE — PLAN OF CARE
[facility] Care Plan    POC reviewed with Kobi Pittman and family at 1400. Pt verbalized understanding. Questions and concerns addressed. No acute events today. Pt progressing toward goals. Will continue to monitor. See below and flowsheets for full assessment and VS info.             Is this a stroke patient? yes- Stroke booklet reviewed with patient, risk factors identified for patient and stroke booklet remains at bedside for ongoing education.     Neuro:  [place] Coma Scale  Best Eye Response: 4-->(E4) spontaneous  Best Motor Response: 6-->(M6) obeys commands  Best Verbal Response: 4-->(V4) confused  [place] Coma Scale Score: 14  Assessment Qualifiers: patient not sedated/intubated  Pupil PERRLA: yes     24 hr Temp:  [98.6 °F (37 °C)-100.5 °F (38.1 °C)]     CV:   Rhythm: normal sinus rhythm  BP goals:   SBP < 160  MAP > 65    Resp:      Vent Mode: A/C  Set Rate: 12 BPM  Oxygen Concentration (%): 40  Vt Set: 450 mL  PEEP/CPAP: 5 cmH20    Plan: N/A    GI/:     Diet/Nutrition Received: regular  Last Bowel Movement: 10/17/23  Voiding Characteristics: frequency    Intake/Output Summary (Last 24 hours) at 10/18/2023 1614  Last data filed at 10/18/2023 1447  Gross per 24 hour   Intake 800 ml   Output 2640 ml   Net -1840 ml     Unmeasured Output  Urine Occurrence: 1  Stool Occurrence: 1  Emesis Occurrence: 1  Pad Count: 1    Labs/Accuchecks:  Recent Labs   Lab 10/18/23  0021   WBC 12.01   RBC 4.27*   HGB 13.4*   HCT 39.9*         Recent Labs   Lab 10/18/23  0021   *   K 4.0   CO2 23      BUN 19   CREATININE 0.8   ALKPHOS 95   ALT 55*   AST 63*   BILITOT 1.0      Recent Labs   Lab 10/14/23  0404   INR 1.0   APTT 22.8      Recent Labs   Lab 10/13/23  2210   CPK 92   TROPONINI <0.012       Electrolytes: N/A - electrolytes WDL  Accuchecks: none    Gtts:      LDA/Wounds:  Lines/Drains/Airways       Peripheral Intravenous Line  Duration                  Peripheral IV - Single Lumen 10/17/23 0009 18 G  Anterior;Left Forearm 1 day         Peripheral IV - Single Lumen 10/17/23 0009 20 G Anterior;Right Forearm 1 day                  Wounds: No  Wound care consulted: No

## 2023-10-18 NOTE — SUBJECTIVE & OBJECTIVE
Interval History:  NAEON.     Review of Systems   Unable to perform ROS: Dementia   Constitutional:  Negative for fever.   HENT:  Negative for trouble swallowing.    Eyes:  Negative for visual disturbance.   Respiratory:  Negative for shortness of breath.    Cardiovascular:  Negative for chest pain, palpitations and leg swelling.   Gastrointestinal:  Negative for abdominal pain.   Genitourinary:  Negative for difficulty urinating.   Musculoskeletal:  Negative for gait problem.   Neurological:  Negative for weakness and headaches.   Psychiatric/Behavioral:  Negative for confusion and decreased concentration. The patient is not nervous/anxious.        Objective:     Vitals:  Temp: 98.6 °F (37 °C)  Pulse: 80  Rhythm: normal sinus rhythm  BP: (!) 155/82  MAP (mmHg): 112  Resp: (!) 27  SpO2: 95 %    Temp  Min: 98.6 °F (37 °C)  Max: 100.5 °F (38.1 °C)  Pulse  Min: 60  Max: 91  BP  Min: 151/86  Max: 184/85  MAP (mmHg)  Min: 92  Max: 128  Resp  Min: 16  Max: 27  SpO2  Min: 92 %  Max: 98 %    10/17 0701 - 10/18 0700  In: 1020 [P.O.:820; I.V.:200]  Out: 2840 [Urine:2840]   Unmeasured Output  Urine Occurrence: 1  Stool Occurrence: 1  Emesis Occurrence: 1  Pad Count: 1        Physical Exam  Vitals and nursing note reviewed.   Constitutional:       Appearance: He is normal weight. He is not toxic-appearing or diaphoretic.   HENT:      Head: Normocephalic and atraumatic.      Right Ear: External ear normal.      Left Ear: External ear normal.      Nose: Nose normal.      Mouth/Throat:      Mouth: Mucous membranes are moist.      Pharynx: Oropharynx is clear.   Eyes:      Pupils: Pupils are equal, round, and reactive to light.   Cardiovascular:      Rate and Rhythm: Regular rhythm. Bradycardia present.      Heart sounds: Normal heart sounds. No murmur heard.  Pulmonary:      Effort: Pulmonary effort is normal. No respiratory distress.   Abdominal:      General: There is no distension.      Palpations: Abdomen is soft.       Tenderness: There is no abdominal tenderness. There is no guarding.   Musculoskeletal:      Cervical back: No rigidity.      Right lower leg: No edema.      Left lower leg: No edema.   Skin:     General: Skin is warm and dry.      Capillary Refill: Capillary refill takes less than 2 seconds.   Neurological:      Comments: E3 V4 M6  Drowsy. Oriented x self and place. Mild expressive aphasia. Following commands with repeated encouragement. Emotionally labile.   CN II-XII grossly intact  RUE 4+/5  RLE 4+/5  LUE 5/5  LLE 5/5  Sensation intact. Right sided neglect      Psychiatric:         Mood and Affect: Affect is labile.       \       Medications:  Continuous ScheduledamLODIPine, 10 mg, Daily  heparin (porcine), 5,000 Units, Q8H  hydrALAZINE, 50 mg, Q8H  losartan, 100 mg, Daily  memantine, 5 mg, BID  polyethylene glycol, 17 g, BID  QUEtiapine, 12.5 mg, QHS  senna-docusate 8.6-50 mg, 2 tablet, BID  silodosin, 4 mg, Daily    PRNacetaminophen, 650 mg, Q6H PRN  dextrose 10%, 12.5 g, PRN  dextrose 10%, 25 g, PRN  hydrALAZINE, 10 mg, Q4H PRN  magnesium oxide, 800 mg, PRN  magnesium oxide, 800 mg, PRN  methocarbamoL, 500 mg, Q6H PRN  ondansetron, 4 mg, Q8H PRN  oxyCODONE, 5 mg, Q6H PRN  potassium bicarbonate, 35 mEq, PRN  potassium bicarbonate, 50 mEq, PRN  potassium bicarbonate, 60 mEq, PRN  potassium, sodium phosphates, 2 packet, PRN  potassium, sodium phosphates, 2 packet, PRN  potassium, sodium phosphates, 2 packet, PRN      Today I personally reviewed pertinent medications, lines/drains/airways, imaging, cardiology results, laboratory results, microbiology results, notably:    Diet  Diet Adult Regular (IDDSI Level 7) Thin  Diet Adult Regular (IDDSI Level 7) Thin

## 2023-10-18 NOTE — PHYSICIAN QUERY
PT Name: Kobi Pittman  MR #: [id number]     DOCUMENTATION CLARIFICATION     CDS/: Adonis Villanueva     RN          Contact information:  [email or web address]  This form is a permanent document in the medical record.    Query Date: October 18, 2023    By submitting this query, we are merely seeking further clarification of documentation.  Please utilize your independent clinical judgment when addressing the question(s) below.    The Medical Record contains the following:  Indicators Supporting Clinical Findings Location in Medical Record   x Decreased LOC, neurological deficits Nontraumatic cortical hemorrhage of left cerebral hemisphere  73yoM with PMH of vascular dementia who was transferred from [facility] with confusion and difficulty seeing around 5a.m. on 10/13. CTH left parietooccipital hemorrhage and left subdural hematoma, 3mm R MLS. CTA no LVO. MRI appears to show L temporal/occipital infarct with hemorrhagic conversion. Echo with EF 65% and mild LAE.     Patient had episode of afib RVR confirmed on EKG. Etiology of stroke likely cardioembolic. Required 1 PRN for BP. Stable for step down. Dispo is rehab.      Recommendations:  - Antithrombotics: none, contraindicated due to ICH, will need to discuss start date for anticoagulation  - Statin: atorvastatin 40 mg daily  - Risk factors include: hypertension, afib  - PT/OT/SLP/PM&R: recommending rehab  - Mechanical SCD VTE prophylaxis, sq heparin  - SBP< 140      Midline shift of brain  Noted on imaging, stable on most recent CT 10/15  Continue frequent neuro checks.      Vasogenic brain edema  -Area of cerebral edema identified when reviewing brain imaging in the Left PCA territory, there is mass effect associated with it.   -Continue frequent q1hr neuro checks as any acute changes or worsening neuro status may signify expansion of the insult and/or area of the edema, which may require acute interventions to prevent loss of  function and/or death.  -Obtain stat CTH for any new or worsening neuro changes and notify primary team immediately  -stable on last CT on 10/15    10/16/2023 NAEO, neuro exam stable although has waxing/waning. Lethargic this morning during exam, although arouses with repeated stimuli. Cardene gtt weaned off today, however required PRN hydralazine a few hours later. Started on losartan for more consistent BP control, anticipate likely step down tomorrow once BP more consistently at goal.  10/17/2023 patient had episode of afib RVR last night which was confirmed on EKG. Patient converted back to NSR, metoprolol not given. Off cardene, requiring prn hydralazine/labetalol, dispo rehab  10/18/2023 neuro exam stable, pending stepdown.     Progress Note VN 10/18 (Sabrina-Dawson)    Ladora Coma Scale (GCS)      Traumatic Injury     x Acute/Chronic Conditions PMH of vascular dementia who was transferred from [facility] with confusion and difficulty seeing around 5a.m. on 10/13. CTH left parietooccipital hemorrhage and left subdural hematoma, 3mm R MLS. CTA no LVO. MRI appears to show L temporal/occipital infarct with hemorrhagic conversion. Echo with EF 65% and mild LAE. Progress Note VN 10/18 (Sabrina-Dawson)   x Radiology FINDINGS:  There is a stable appearance of the left temporal occipital intraparenchymal hemorrhage with intraventricular extension.  Stable 4 mm in width subdural hematoma along the left hemisphere.     Midline shift to the right measuring 4 mm is similar in appearance.  The basal cisterns are patent.     No acute territorial infarct or hydrocephalus.     Near opacification left maxillary sinus.  The mastoid air cells are clear.     Impression:     Stable intracranial hemorrhage (intraparenchymal intraventricular and subdural) and stable mild midline shift.  No hydrocephalus.    CT Head 10/15    Treatment (i.e. IV Steroids, Mannitol, Surgery)      Other         Provider, please  specify diagnosis or diagnoses associated with above clinical findings.   [  x ] Non-Traumatic Brain Compression    [   ] Other neurological diagnosis (please specify):________   [  ] Clinically Undetermined       Please document in your progress notes daily for the duration of treatment until resolved and include in your discharge summary.    Form No. [id number]

## 2023-10-18 NOTE — PLAN OF CARE
[facility] Care Plan    POC reviewed with Kobi Pittman and family at 0300. Pt verbalized understanding. Questions and concerns addressed. No acute events overnight. Pt progressing toward goals. Will continue to monitor. See below and flowsheets for full assessment and VS info.     -Hydralazine x1      Is this a stroke patient? yes- Stroke booklet reviewed with patient and family, risk factors identified for patient and stroke booklet remains at bedside for ongoing education.     Neuro:  Malick Coma Scale  Best Eye Response: 4-->(E4) spontaneous  Best Motor Response: 6-->(M6) obeys commands  Best Verbal Response: 4-->(V4) confused  Malick Coma Scale Score: 14  Assessment Qualifiers: patient not sedated/intubated  Pupil PERRLA: yes     24hr Temp:  [99 °F (37.2 °C)-100.5 °F (38.1 °C)]     CV:   Rhythm: normal sinus rhythm  BP goals:   SBP < 160  MAP > 65    Resp:      Vent Mode: A/C  Set Rate: 12 BPM  Oxygen Concentration (%): 40  Vt Set: 450 mL  PEEP/CPAP: 5 cmH20    Plan: N/A    GI/:     Diet/Nutrition Received: regular  Last Bowel Movement: 10/17/23  Voiding Characteristics: frequency    Intake/Output Summary (Last 24 hours) at 10/18/2023 0444  Last data filed at 10/18/2023 0254  Gross per 24 hour   Intake 1070 ml   Output 2490 ml   Net -1420 ml     Unmeasured Output  Urine Occurrence: 1  Stool Occurrence: 1  Emesis Occurrence: 1  Pad Count: 1    Labs/Accuchecks:  Recent Labs   Lab 10/18/23  0021   WBC 12.01   RBC 4.27*   HGB 13.4*   HCT 39.9*         Recent Labs   Lab 10/18/23  0021   *   K 4.0   CO2 23      BUN 19   CREATININE 0.8   ALKPHOS 95   ALT 55*   AST 63*   BILITOT 1.0      Recent Labs   Lab 10/14/23  0404   INR 1.0   APTT 22.8      Recent Labs   Lab 10/13/23  2210   CPK 92   TROPONINI <0.012       Electrolytes: N/A - electrolytes WDL  Accuchecks: none    Gtts:      LDA/Wounds:  Lines/Drains/Airways       Peripheral Intravenous Line  Duration                  Peripheral IV - Single Lumen  10/17/23 0009 18 G Anterior;Left Forearm 1 day         Peripheral IV - Single Lumen 10/17/23 0009 20 G Anterior;Right Forearm 1 day                  Wounds: No  Wound care consulted: No

## 2023-10-18 NOTE — PT/OT/SLP PROGRESS
"Speech Language Pathology Treatment    Patient Name:  Kobi Pittman   MRN:  [id number]  [id number] A    Admitting Diagnosis: Nontraumatic cortical hemorrhage of left cerebral hemisphere    Recommendations:                General Recommendations:  Dysphagia therapy, Speech/language therapy, and Cognitive-linguistic therapy  Diet recommendations:  Regular Diet - IDDSI Level 7, Thin liquids - IDDSI Level 0   Aspiration Precautions: 1 bite/sip at a time, alternate bites and sips, Feed only when awake/alert, HOB to 90 degrees, Small bites/sips, and Standard aspiration precautions   General Precautions: Standard, vision impaired, fall  Communication strategies:  provide increased time to answer and go to room if call light pushed    Assessment:     Kobi Pittman is a 73 y.o. male with an SLP diagnosis of Cognitive-Linguistic Impairment.  He presents with increased participation with ST this date. ST will continue to follow.    Subjective     Patient awake and cooperative. Wife at bedside.   Wife states, "He ate all of these peaches."  Patient states, "it's hard. I don't know. Like there's too much in my mouth."       Objective:     Has the patient been evaluated by SLP for swallowing?   Yes  Keep patient NPO? No   Current Respiratory Status:   nasal cannula    Patient seen for ongoing speech therapy. He presented with no overt s/s of aspiration or difficulties noted with bites of peaches and sips of juice/Boost via straw sips. Patient reporting occasional difficulties swallowing solids, however, unable to vocalize what difficulties are (difficulty chewing, pain, globus sensation, coughing/choking, etc.). After given options of possible difficulties, patient indicated occasional difficulties feeling like there is "too much in [his] mouth." SLP provided education on recommendations for small bites and sips and alternating bites and sips.  SLP provided education on available soft diets. Patient reports preference to remain on a " regular diet at this time. Patient oriented to self and wife. Her had significant difficulty recalling wife's . Wife reports patient used to  her prescriptions often with no difficulties recalling . Given mod/max cues, patient provided directions to city his home is located in. He had poor recall of functional information after a short delay (less than 5 minutes). SLP provided education on SLP role, goals, and the importance of participation in therapy, and ways to independently target cognitive-linguistic goals outside of therapy sessions. Patient verbalized understanding of all discussed and is in agreement with POC.     Goals:   Multidisciplinary Problems       SLP Goals          Problem: SLP    Goal Priority Disciplines Outcome   SLP Goal     SLP Ongoing, Progressing   Description: Speech Language Pathology Goals  Goals expected to be met by 10/29    1. Pt will tolerate a regular sold and thin liquids diet without any overt s/sx of airway compromise.   2. Pt will participate in ongoing assessment of reading, writing and visuospatial skills to determine additional therapeutic needs.   3. Pt will independently orient x4.   4. Pt will independently answer multiunit/complex Y/N questions with 80% acc.  5. Pt will independently  follow 2-step directions with 80% acc.  6. Pt will answer simple problem solving tasks (compare/contrast/ sequence, math/time etc) with 80% acc following min assist.  7. Pt will independently complete a variety of word finding tasks with 80% acc.                             Plan:     Patient to be seen:  4 x/week   Plan of Care expires:  23  Plan of Care reviewed with:  patient, spouse   SLP Follow-Up:  Yes       Discharge recommendations:  High Intensity Therapy   Barriers to Discharge:  None    Time Tracking:     SLP Treatment Date:   10/18/23  Speech Start Time:  852  Speech Stop Time:  914     Speech Total Time (min):  22 min    Billable Minutes: Eval 11  and Self  Care/Home Management Training 11    10/18/2023

## 2023-10-19 PROBLEM — E85.4 CEREBRAL AMYLOID ANGIOPATHY: Status: ACTIVE | Noted: 2023-10-19

## 2023-10-19 PROBLEM — I68.0 CEREBRAL AMYLOID ANGIOPATHY: Status: ACTIVE | Noted: 2023-10-19

## 2023-10-19 LAB
ALBUMIN SERPL BCP-MCNC: 3 G/DL (ref 3.5–5.2)
ALP SERPL-CCNC: 101 U/L (ref 55–135)
ALT SERPL W/O P-5'-P-CCNC: 47 U/L (ref 10–44)
ANION GAP SERPL CALC-SCNC: 11 MMOL/L (ref 8–16)
AST SERPL-CCNC: 45 U/L (ref 10–40)
BASOPHILS # BLD AUTO: 0.03 K/UL (ref 0–0.2)
BASOPHILS NFR BLD: 0.3 % (ref 0–1.9)
BILIRUB SERPL-MCNC: 0.7 MG/DL (ref 0.1–1)
BUN SERPL-MCNC: 21 MG/DL (ref 8–23)
CALCIUM SERPL-MCNC: 9 MG/DL (ref 8.7–10.5)
CHLORIDE SERPL-SCNC: 97 MMOL/L (ref 95–110)
CO2 SERPL-SCNC: 23 MMOL/L (ref 23–29)
CREAT SERPL-MCNC: 0.8 MG/DL (ref 0.5–1.4)
DIFFERENTIAL METHOD: ABNORMAL
EOSINOPHIL # BLD AUTO: 0.2 K/UL (ref 0–0.5)
EOSINOPHIL NFR BLD: 1.9 % (ref 0–8)
ERYTHROCYTE [DISTWIDTH] IN BLOOD BY AUTOMATED COUNT: 12.3 % (ref 11.5–14.5)
EST. GFR  (NO RACE VARIABLE): >60 ML/MIN/1.73 M^2
GLUCOSE SERPL-MCNC: 109 MG/DL (ref 70–110)
HCT VFR BLD AUTO: 41.2 % (ref 40–54)
HGB BLD-MCNC: 13.9 G/DL (ref 14–18)
IMM GRANULOCYTES # BLD AUTO: 0.06 K/UL (ref 0–0.04)
IMM GRANULOCYTES NFR BLD AUTO: 0.6 % (ref 0–0.5)
LYMPHOCYTES # BLD AUTO: 1.4 K/UL (ref 1–4.8)
LYMPHOCYTES NFR BLD: 14.9 % (ref 18–48)
MAGNESIUM SERPL-MCNC: 2 MG/DL (ref 1.6–2.6)
MCH RBC QN AUTO: 31.6 PG (ref 27–31)
MCHC RBC AUTO-ENTMCNC: 33.7 G/DL (ref 32–36)
MCV RBC AUTO: 94 FL (ref 82–98)
MONOCYTES # BLD AUTO: 1 K/UL (ref 0.3–1)
MONOCYTES NFR BLD: 10.9 % (ref 4–15)
NEUTROPHILS # BLD AUTO: 6.7 K/UL (ref 1.8–7.7)
NEUTROPHILS NFR BLD: 71.4 % (ref 38–73)
NRBC BLD-RTO: 0 /100 WBC
PHOSPHATE SERPL-MCNC: 3.2 MG/DL (ref 2.7–4.5)
PLATELET # BLD AUTO: 255 K/UL (ref 150–450)
PMV BLD AUTO: 10.5 FL (ref 9.2–12.9)
POTASSIUM SERPL-SCNC: 3.6 MMOL/L (ref 3.5–5.1)
PROT SERPL-MCNC: 6.5 G/DL (ref 6–8.4)
RBC # BLD AUTO: 4.4 M/UL (ref 4.6–6.2)
SARS-COV-2 RNA RESP QL NAA+PROBE: NOT DETECTED
SODIUM SERPL-SCNC: 131 MMOL/L (ref 136–145)
WBC # BLD AUTO: 9.39 K/UL (ref 3.9–12.7)

## 2023-10-19 PROCEDURE — 80053 COMPREHEN METABOLIC PANEL: CPT | Performed by: REGISTERED NURSE

## 2023-10-19 PROCEDURE — 97116 GAIT TRAINING THERAPY: CPT

## 2023-10-19 PROCEDURE — 84100 ASSAY OF PHOSPHORUS: CPT | Performed by: REGISTERED NURSE

## 2023-10-19 PROCEDURE — 63600175 PHARM REV CODE 636 W HCPCS

## 2023-10-19 PROCEDURE — 25000003 PHARM REV CODE 250

## 2023-10-19 PROCEDURE — 93005 ELECTROCARDIOGRAM TRACING: CPT

## 2023-10-19 PROCEDURE — 87635 SARS-COV-2 COVID-19 AMP PRB: CPT | Performed by: PSYCHIATRY & NEUROLOGY

## 2023-10-19 PROCEDURE — 11000001 HC ACUTE MED/SURG PRIVATE ROOM

## 2023-10-19 PROCEDURE — 93010 EKG 12-LEAD: ICD-10-PCS | Mod: ,,, | Performed by: INTERNAL MEDICINE

## 2023-10-19 PROCEDURE — 83735 ASSAY OF MAGNESIUM: CPT | Performed by: REGISTERED NURSE

## 2023-10-19 PROCEDURE — 93010 ELECTROCARDIOGRAM REPORT: CPT | Mod: ,,, | Performed by: INTERNAL MEDICINE

## 2023-10-19 PROCEDURE — 36415 COLL VENOUS BLD VENIPUNCTURE: CPT | Performed by: REGISTERED NURSE

## 2023-10-19 PROCEDURE — 97535 SELF CARE MNGMENT TRAINING: CPT

## 2023-10-19 PROCEDURE — 99233 SBSQ HOSP IP/OBS HIGH 50: CPT | Mod: ,,, | Performed by: PSYCHIATRY & NEUROLOGY

## 2023-10-19 PROCEDURE — 99233 PR SUBSEQUENT HOSPITAL CARE,LEVL III: ICD-10-PCS | Mod: ,,, | Performed by: PSYCHIATRY & NEUROLOGY

## 2023-10-19 PROCEDURE — 92507 TX SP LANG VOICE COMM INDIV: CPT

## 2023-10-19 PROCEDURE — 85025 COMPLETE CBC W/AUTO DIFF WBC: CPT | Performed by: REGISTERED NURSE

## 2023-10-19 RX ORDER — QUETIAPINE FUMARATE 25 MG/1
25 TABLET, FILM COATED ORAL NIGHTLY PRN
Qty: 30 TABLET | Refills: 11 | Status: ON HOLD | OUTPATIENT
Start: 2023-10-19 | End: 2023-11-02 | Stop reason: HOSPADM

## 2023-10-19 RX ORDER — SILODOSIN 4 MG/1
4 CAPSULE ORAL DAILY
Qty: 30 CAPSULE | Refills: 2 | Status: ON HOLD | OUTPATIENT
Start: 2023-10-20 | End: 2023-11-02 | Stop reason: HOSPADM

## 2023-10-19 RX ORDER — AMLODIPINE BESYLATE 10 MG/1
10 TABLET ORAL DAILY
Qty: 30 TABLET | Refills: 2 | Status: ON HOLD | OUTPATIENT
Start: 2023-10-20 | End: 2023-11-02 | Stop reason: HOSPADM

## 2023-10-19 RX ORDER — HYDRALAZINE HYDROCHLORIDE 50 MG/1
50 TABLET, FILM COATED ORAL EVERY 8 HOURS
Qty: 90 TABLET | Refills: 2 | Status: ON HOLD | OUTPATIENT
Start: 2023-10-19 | End: 2023-11-02 | Stop reason: HOSPADM

## 2023-10-19 RX ORDER — LOSARTAN POTASSIUM 100 MG/1
100 TABLET ORAL DAILY
Qty: 90 TABLET | Refills: 0 | Status: ON HOLD | OUTPATIENT
Start: 2023-10-20 | End: 2023-11-02 | Stop reason: HOSPADM

## 2023-10-19 RX ADMIN — MEMANTINE HYDROCHLORIDE 5 MG: 5 TABLET ORAL at 09:10

## 2023-10-19 RX ADMIN — HEPARIN SODIUM 5000 UNITS: 5000 INJECTION INTRAVENOUS; SUBCUTANEOUS at 05:10

## 2023-10-19 RX ADMIN — HYDRALAZINE HYDROCHLORIDE 50 MG: 50 TABLET ORAL at 05:10

## 2023-10-19 RX ADMIN — HEPARIN SODIUM 5000 UNITS: 5000 INJECTION INTRAVENOUS; SUBCUTANEOUS at 01:10

## 2023-10-19 RX ADMIN — LOSARTAN POTASSIUM 100 MG: 50 TABLET, FILM COATED ORAL at 08:10

## 2023-10-19 RX ADMIN — POLYETHYLENE GLYCOL 3350 17 G: 17 POWDER, FOR SOLUTION ORAL at 08:10

## 2023-10-19 RX ADMIN — HYDRALAZINE HYDROCHLORIDE 50 MG: 50 TABLET ORAL at 09:10

## 2023-10-19 RX ADMIN — MEMANTINE HYDROCHLORIDE 5 MG: 5 TABLET ORAL at 08:10

## 2023-10-19 RX ADMIN — SENNOSIDES AND DOCUSATE SODIUM 2 TABLET: 50; 8.6 TABLET ORAL at 08:10

## 2023-10-19 RX ADMIN — QUETIAPINE FUMARATE 12.5 MG: 25 TABLET ORAL at 09:10

## 2023-10-19 RX ADMIN — ACETAMINOPHEN 650 MG: 325 TABLET ORAL at 06:10

## 2023-10-19 RX ADMIN — SILODOSIN 4 MG: 4 CAPSULE ORAL at 08:10

## 2023-10-19 RX ADMIN — OXYCODONE HYDROCHLORIDE 5 MG: 5 TABLET ORAL at 09:10

## 2023-10-19 RX ADMIN — SENNOSIDES AND DOCUSATE SODIUM 2 TABLET: 50; 8.6 TABLET ORAL at 09:10

## 2023-10-19 RX ADMIN — POLYETHYLENE GLYCOL 3350 17 G: 17 POWDER, FOR SOLUTION ORAL at 09:10

## 2023-10-19 RX ADMIN — HEPARIN SODIUM 5000 UNITS: 5000 INJECTION INTRAVENOUS; SUBCUTANEOUS at 09:10

## 2023-10-19 RX ADMIN — HYDRALAZINE HYDROCHLORIDE 50 MG: 50 TABLET ORAL at 01:10

## 2023-10-19 RX ADMIN — ACETAMINOPHEN 650 MG: 325 TABLET ORAL at 12:10

## 2023-10-19 RX ADMIN — AMLODIPINE BESYLATE 10 MG: 10 TABLET ORAL at 08:10

## 2023-10-19 NOTE — ASSESSMENT & PLAN NOTE
73yoM with PMH of vascular dementia who was transferred from [facility] with confusion and difficulty seeing around 5a.m. on 10/13. CTH left parietooccipital hemorrhage and left subdural hematoma, 3mm R MLS. CTA no LVO. MRI appears to show L temporal/occipital infarct with hemorrhagic conversion. Echo with EF 65% and mild LAE.    Patient had episode of afib RVR confirmed on EKG. Etiology of stroke likely cardioembolic. Required 1 PRN for BP. Stable for step down. Dispo is rehab.     Recommendations:  - Antithrombotics: none, contraindicated due to ICH, will need to discuss start date for anticoagulation  - Statin: atorvastatin 40 mg daily  - Risk factors include: hypertension, afib  - PT/OT/SLP/PM&R: recommending rehab  - Mechanical SCD VTE prophylaxis, sq heparin  - SBP< 140  - Not a candidate for anticoagulation given evidence of cerebral amyloid angiopathy on MRI

## 2023-10-19 NOTE — NURSING
Pt transferred from [facility] at this time with spouse at this time. Pt AAOx person; unable to report time, place, or situation. AARON without any problems. Pupils equal. Partial vision change to right eye. Pt able to turn and reposition with four eyes done (see below). Wife states pt placed on oxygen in Critical Care due to oxygen sat being in low 90s. Pt above 95% oxygen sat on room air. Request oxygen on pt while sleeping (pt without hx of using cpap/bipap at home). One liter place on pt while sleeping. No distress at present; states mild headache with meds given prior to transport. Stroke education booklet provided to pt at this time with questions and concerns addressed to pt and spouse. SCDs applied, fall precautions in place, bed alarm on, and call light in reach.     .Nurses Note -- 4 Eyes      10/18/23   2200      Skin assessed during: Transfer      [x] No Altered Skin Integrity Present    [x]Prevention Measures Documented    Wound Care Consulted? No    Attending Nurse:  REGULO Galeana     Second RN/Staff Member:  SHERI Mota

## 2023-10-19 NOTE — PLAN OF CARE
Problem: Adult Inpatient Plan of Care  Goal: Plan of Care Review  Outcome: Ongoing, Progressing  Goal: Patient-Specific Goal (Individualized)  Description: Admit Date:     Admit Dx:    Past Medical History:  No date: Acid reflux  No date: Cataract  No date: Kidney stone    Past Surgical History:  No date: COLONOSCOPY    Individualization:   1. Calming techniques    Restraints: 10/14 0305, pulling lines         Outcome: Ongoing, Progressing  Goal: Absence of Hospital-Acquired Illness or Injury  Outcome: Ongoing, Progressing  Intervention: Identify and Manage Fall Risk  Flowsheets (Taken 10/19/2023 1412)  Safety Promotion/Fall Prevention:   assistive device/personal item within reach   nonskid shoes/socks when out of bed   instructed to call staff for mobility   Fall Risk reviewed with patient/family   Fall Risk signage in place  Intervention: Prevent Skin Injury  Flowsheets (Taken 10/19/2023 1412)  Body Position: position changed independently  Skin Protection: adhesive use limited  Goal: Optimal Comfort and Wellbeing  Outcome: Ongoing, Progressing  Goal: Readiness for Transition of Care  Outcome: Ongoing, Progressing     Problem: Skin Injury Risk Increased  Goal: Skin Health and Integrity  Outcome: Ongoing, Progressing  Intervention: Optimize Skin Protection  Flowsheets (Taken 10/19/2023 1416)  Pressure Reduction Techniques: frequent weight shift encouraged  Pressure Reduction Devices:   pressure-redistributing mattress utilized   positioning supports utilized  Skin Protection: adhesive use limited  Head of Bed (HOB) Positioning: HOB elevated     Problem: Adjustment to Illness (Stroke, Hemorrhagic)  Goal: Optimal Coping  Outcome: Ongoing, Progressing  Intervention: Support Psychosocial Response to Stroke  Flowsheets (Taken 10/19/2023 1416)  Supportive Measures: active listening utilized  Family/Support System Care:   caregiver stress acknowledged   involvement promoted   presence promoted     Problem: Bowel  Elimination Impaired (Stroke, Hemorrhagic)  Goal: Effective Bowel Elimination  Outcome: Ongoing, Progressing  Intervention: Promote Effective Bowel Elimination  Flowsheets (Taken 10/19/2023 1416)  Bowel Elimination Management:   sitting position facilitated   hygiene measures promoted  Bowel Program: maintenance program followed     Problem: Cerebral Tissue Perfusion (Stroke, Hemorrhagic)  Goal: Optimal Cerebral Tissue Perfusion  Outcome: Ongoing, Progressing  Intervention: Protect and Optimize Cerebral Perfusion  Flowsheets (Taken 10/19/2023 1416)  Sensory Stimulation Regulation:   television on   care clustered   visual stimulation provided  Cerebral Perfusion Promotion: blood pressure monitored     Problem: Cognitive Impairment (Stroke, Hemorrhagic)  Goal: Optimal Cognitive Function  Outcome: Ongoing, Progressing  Intervention: Optimize Cognitive Function  Flowsheets (Taken 10/19/2023 1416)  Environment Familiarity/Consistency:   daily routine followed   familiar objects from home provided  Reorientation Measures:   clock in view   familiar social contact encouraged  Sensory Stimulation Regulation:   television on   care clustered   visual stimulation provided     Problem: Communication Impairment (Stroke, Hemorrhagic)  Goal: Effective Communication Skills  Outcome: Ongoing, Progressing  Intervention: Optimize Communication Skills  Flowsheets (Taken 10/19/2023 1416)  Communication Enhancement Strategies: call light answered in person     Problem: Functional Ability Impaired (Stroke, Hemorrhagic)  Goal: Optimal Functional Ability  Outcome: Ongoing, Progressing  Intervention: Optimize Functional Ability  Flowsheets (Taken 10/19/2023 1416)  Self-Care Promotion: independence encouraged  Activity Management: Rolling - L1     Problem: Pain (Stroke, Hemorrhagic)  Goal: Acceptable Pain Control  Outcome: Ongoing, Progressing  Intervention: Monitor and Manage Pain  Flowsheets (Taken 10/19/2023 1416)  Pain Management  Interventions:   care clustered   pillow support provided   position adjusted     Problem: Respiratory Compromise (Stroke, Hemorrhagic)  Goal: Effective Oxygenation and Ventilation  Outcome: Ongoing, Progressing  Intervention: Optimize Oxygenation and Ventilation  Flowsheets (Taken 10/19/2023 1416)  Head of Bed (HOB) Positioning: HOB elevated     Problem: Sensorimotor Impairment (Stroke, Hemorrhagic)  Goal: Improved Sensorimotor Function  Outcome: Ongoing, Progressing  Intervention: Optimize Range of Motion, Motor Control and Function  Flowsheets (Taken 10/19/2023 1416)  Positioning/Transfer Devices: pillows  Range of Motion: active ROM (range of motion) encouraged  Intervention: Optimize Sensory and Perceptual Ability  Flowsheets (Taken 10/19/2023 1416)  Pressure Reduction Techniques: frequent weight shift encouraged  Pressure Reduction Devices:   pressure-redistributing mattress utilized   positioning supports utilized     Problem: Swallowing Impairment (Stroke, Hemorrhagic)  Goal: Optimal Eating and Swallowing Without Aspiration  Outcome: Ongoing, Progressing  Intervention: Optimize Eating and Swallowing  Flowsheets (Taken 10/19/2023 1416)  Recommended Feeding/Eating Techniques (Swallow Eval):   patient is independent, no specific recommendations   maintain upright sitting position for eating   maintain upright posture during/after eating for 30 minutes   minimize distractions during oral intake     Problem: Fall Injury Risk  Goal: Absence of Fall and Fall-Related Injury  Outcome: Ongoing, Progressing  Intervention: Identify and Manage Contributors  Flowsheets (Taken 10/19/2023 1416)  Self-Care Promotion: independence encouraged  Medication Review/Management: medications reviewed  Intervention: Promote Injury-Free Environment  Flowsheets (Taken 10/19/2023 1416)  Safety Promotion/Fall Prevention:   assistive device/personal item within reach   bed alarm set   Fall Risk reviewed with patient/family   Fall Risk  signage in place   lighting adjusted   medications reviewed   instructed to call staff for mobility   nonskid shoes/socks when out of bed   side rails raised x 3     Patients VSS. Patient seems sleepy but is able to wake up and communicate with RN. Patient does not have much of an appetite. Family at bedside to help with meals and toileting.

## 2023-10-19 NOTE — PLAN OF CARE
Goals reviewed and revised as appropriate. Continue POC.    Problem: Physical Therapy  Goal: Physical Therapy Goal  Description: Goals to be met by: 2023     Patient will increase functional independence with mobility by performin. Supine to sit with Stand-by Assistance - met 10/19/2023  Updated: modified independence  2. Sit to supine with Stand-by Assistance - met 10/19/2023  Updated: modified independence  3. Sit to stand transfer with Stand-by Assistance  4. Bed to chair transfer with Stand-by Assistance using LRAD  5. Gait  x 100 feet with Contact Guard Assistance using LRAD.   6. Lower extremity exercise program x15 reps per handout, with independence    Outcome: Ongoing, Progressing     10/19/2023

## 2023-10-19 NOTE — PROGRESS NOTES
Sebastian Moe - Neurosurgery ([facility])  Vascular Neurology  Comprehensive Stroke Center  Progress Note    Assessment/Plan:     * Nontraumatic cortical hemorrhage of left cerebral hemisphere  73yoM with PMH of vascular dementia who was transferred from [facility] with confusion and difficulty seeing around 5a.m. on 10/13. CTH left parietooccipital hemorrhage and left subdural hematoma, 3mm R MLS. CTA no LVO. MRI appears to show L temporal/occipital infarct with hemorrhagic conversion. Echo with EF 65% and mild LAE.    Patient had episode of afib RVR confirmed on EKG. Etiology of stroke likely cardioembolic. Required 1 PRN for BP. Stable for step down. Dispo is rehab.     Recommendations:  - Antithrombotics: none, contraindicated due to ICH, will need to discuss start date for anticoagulation  - Statin: atorvastatin 40 mg daily  - Risk factors include: hypertension, afib  - PT/OT/SLP/PM&R: recommending rehab  - Mechanical SCD VTE prophylaxis, sq heparin  - SBP< 140  - Not a candidate for anticoagulation given evidence of cerebral amyloid angiopathy on MRI     Cerebral amyloid angiopathy  Evidence of cerebral amyloid angiopathy seen on MRI. Likely contributing factor of stroke. Patient will likely not be a candidate for anticoagulation and will be referred to EP for possible watchman placement given A fib and likely cardioembolic etiology of his stroke.     Plan:  -Refer to outpatient EP upon discharge for possible watchman placement    Paroxysmal atrial fibrillation  Stroke risk factor  Confirmed on EKG  Will need to discuss when to start anticoagulation in setting of ICH    Endotracheally intubated  Remains extubated since 10/14    Midline shift of brain  Noted on imaging, stable on most recent CT 10/15  Continue frequent neuro checks.     Vasogenic brain edema  -Area of cerebral edema identified when reviewing brain imaging in the Left PCA territory, there is mass effect associated with it.    -Continue frequent q1hr neuro checks as any acute changes or worsening neuro status may signify expansion of the insult and/or area of the edema, which may require acute interventions to prevent loss of function and/or death.  -Obtain stat CTH for any new or worsening neuro changes and notify primary team immediately  -stable on last CT on 10/15    Vascular dementia  Continue home namenda  Currently on seroquel nightly         10/16/2023 NAEO, neuro exam stable although has waxing/waning. Lethargic this morning during exam, although arouses with repeated stimuli. Cardene gtt weaned off today, however required PRN hydralazine a few hours later. Started on losartan for more consistent BP control, anticipate likely step down tomorrow once BP more consistently at goal.  10/17/2023 patient had episode of afib RVR last night which was confirmed on EKG. Patient converted back to NSR, metoprolol not given. Off cardene, requiring prn hydralazine/labetalol, dispo rehab    Patient with left parietooccipital hemorrhage and left subdural hematoma who orginally presented to [facility] with confusion and blurry/double vision stepped down to NPU. CTA revealed no LVO. MRI revealed temporal/occipital hemorrhagic conversion within an MCA distribution infarct. Patient had episode of A fib that resolved without intervention and echo remarkable for mild left atrial enlargement. Etiology thought to be cardioembolic. He will not be a candidate for anticoagulation given evidence of cerebral amyloid angiopathy seen on MRI.       STROKE DOCUMENTATION        NIH Scale:  1a. Level of Consciousness: 0-->Alert, keenly responsive  1b. LOC Questions: 2-->Answers neither question correctly  1c. LOC Commands: 0-->Performs both tasks correctly  2. Best Gaze: 0-->Normal  3. Visual: 1-->Partial hemianopia  4. Facial Palsy: 0-->Normal symmetrical movements  5a. Motor Arm, Left: 0-->No drift, limb holds 90 (or 45) degrees for full 10  secs  5b. Motor Arm, Right: 0-->No drift, limb holds 90 (or 45) degrees for full 10 secs  6a. Motor Leg, Left: 0-->No drift, leg holds 30 degree position for full 5 secs  6b. Motor Leg, Right: 0-->No drift, leg holds 30 degree position for full 5 secs  7. Limb Ataxia: 0-->Absent  8. Sensory: 0-->Normal, no sensory loss  9. Best Language: 1-->Mild-to-moderate aphasia, some obvious loss of fluency or facility of comprehension, without significant limitation on ideas expressed or form of expression. Reduction of speech and/or comprehension, however, makes conversation. . . (see row details)  10. Dysarthria: 0-->Normal  11. Extinction and Inattention (formerly Neglect): 0-->No abnormality  Total (NIH Stroke Scale): 4       Modified [place] Score: 3  Malick Coma Scale:    ABCD2 Score:    [id number] Score:   HAS -BLED Score:   ICH Score:2  Hunt & Reese Classification:      Hemorrhagic change of an Ischemic Stroke: Does this patient have an ischemic stroke with hemorrhagic changes? No     Neurologic Chief Complaint: L parieto-occipital ICH    Subjective:     Interval History: Patient is seen for follow-up neurological assessment and treatment recommendations:     Neuro exam stable. Patient still with confusion and aphasia but with no other complaints.     HPI, Past Medical, Family, and Social History remains the same as documented in the initial encounter.     Review of Systems   Constitutional:  Negative for fever.   Eyes:  Positive for visual disturbance.   Respiratory:  Negative for cough.    Neurological:  Positive for facial asymmetry and speech difficulty. Negative for weakness and numbness.     Scheduled Meds:   amLODIPine  10 mg Oral Daily    heparin (porcine)  5,000 Units Subcutaneous Q8H    hydrALAZINE  50 mg Oral Q8H    losartan  100 mg Oral Daily    memantine  5 mg Oral BID    polyethylene glycol  17 g Oral BID    QUEtiapine  12.5 mg Oral QHS    senna-docusate 8.6-50 mg  2 tablet Oral BID    silodosin   4 mg Oral Daily     Continuous Infusions:      PRN Meds:acetaminophen, dextrose 10%, dextrose 10%, methocarbamoL, ondansetron, oxyCODONE    Objective:     Vital Signs (Most Recent):  Temp: 98.5 °F (36.9 °C) (10/19/23 1244)  Pulse: 66 (10/19/23 1435)  Resp: 18 (10/19/23 1244)  BP: 134/72 (10/19/23 1244)  SpO2: 95 % (10/19/23 1244)  BP Location: Right arm    Vital Signs Range (Last 24H):  Temp:  [97.8 °F (36.6 °C)-98.6 °F (37 °C)]   Pulse:  [60-87]   Resp:  [15-38]   BP: (111-167)/(61-87)   SpO2:  [93 %-97 %]   BP Location: Right arm       Physical Exam  Vitals reviewed.   Constitutional:       General: He is not in acute distress.  HENT:      Head: Normocephalic and atraumatic.   Eyes:      General: Visual field deficit present.   Cardiovascular:      Rate and Rhythm: Normal rate.   Pulmonary:      Effort: Pulmonary effort is normal. No respiratory distress.   Musculoskeletal:      Cervical back: No rigidity.   Skin:     General: Skin is warm and dry.   Neurological:      Mental Status: He is alert.      Cranial Nerves: Facial asymmetry present.         Neurological Exam:   LOC: alert  Attention Span: Good   Language: Expressive aphasia  Articulation: No dysarthria  Orientation: Not oriented to place, Not oriented to time  Visual Fields: Hemianopsia right  EOM (CN III, IV, VI): Gaze preference  left  Facial Movement (CN VII): Lower facial weakness on the Right  Motor: Arm left  Normal 5/5  Leg left  Normal 5/5  Arm right  Normal 5/5  Leg right Normal 5/5  Sensation: Intact to light touch, temperature and vibration    Laboratory:  CMP:   Recent Labs   Lab 10/19/23  0302   CALCIUM 9.0   ALBUMIN 3.0*   PROT 6.5   *   K 3.6   CO2 23   CL 97   BUN 21   CREATININE 0.8   ALKPHOS 101   ALT 47*   AST 45*   BILITOT 0.7       CBC:   Recent Labs   Lab 10/19/23  0302   WBC 9.39   RBC 4.40*   HGB 13.9*   HCT 41.2      MCV 94   MCH 31.6*   MCHC 33.7       Lipid Panel:   Recent Labs   Lab 10/14/23  0404   CHOL 147    LDLCALC 87.8   HDL 38*   TRIG 106       Coagulation:   Recent Labs   Lab 10/14/23  0404   INR 1.0   APTT 22.8       Hgb A1C:   Recent Labs   Lab 10/14/23  0404   HGBA1C 5.7*       TSH:   Recent Labs   Lab 10/14/23  0404   TSH 2.782         Diagnostic Results     Brain Imaging   CTH without contrast 10/15/2023  Impression:  Stable intracranial hemorrhage (intraparenchymal intraventricular and subdural) and stable mild midline shift.  No hydrocephalus.     MRI Brain W WO contrast 10/14/2023  Impression:  Allowing for a difference in technique, similar appearance of left temporal occipital intraparenchymal hemorrhage, left subdural hemorrhage, and intraventricular extension.  No overt hydrocephalus.   No underlying enhancing intracranial lesion.  The venous sinuses appear patent.  numerous small foci of chronic hemosiderin staining are identified within the parenchyma perhaps representing a combination of amyloid angiopathy and hypertensive chronic microhemorrhages.     CTH without contrast 10/13/2023  Impression:  1. Approximate 7 x 5 cm parenchymal hematoma centered within the left occipital lobe moderate local mass effect and extension into the occipital horn left lateral ventricle.  No hydrocephalus.  2. Left-sided subdural hematoma measuring 4 mm in thickness with mild local mass effect and 4 mm left to right midline shift.      Vessel Imaging   CTA head and neck 10/14/2023  Impression:  Redemonstration of a left parieto-occipital intra-axial hematoma, with new intraventricular extension.   Not fully detailed above, there also remains an approximately 4 mm thick subdural hematoma centered over the lateral aspect of the left cerebral convexity.   There is approximately 3 mm rightward midline shift, similar to prior.   CTA head and neck shows no evidence of high-grade stenosis, aneurysm, or large vessel occlusion in the cerebrovascular arterial circulation.   The balloon cuff of the endotracheal tube appears to  distend the tracheal lumen.  Correlate clinically, consider adjustment.       Cardiac Imaging   TTE 10/14/2023    Left Ventricle: The left ventricle is normal in size. Ventricular mass is normal. Normal wall thickness. Normal wall motion. There is normal systolic function. Ejection fraction by visual approximation is 65%. There is normal diastolic function.    Left Atrium: Left atrium is mildly dilated.    Right Ventricle: Normal right ventricular cavity size. Wall thickness is normal. Right ventricle wall motion  is normal. Systolic function is normal.    Aorta: Aortic root is mildly dilated measuring 4.44 cm. Ascending aorta is mildly dilated measuring 3.8 cm.    Pulmonary Artery: The estimated pulmonary artery systolic pressure is 28 mmHg.    IVC/SVC: Normal venous pressure at 3 mmHg.         Amilcar Eldridge DO  [facility]  Department of Vascular Neurology   [facility] Neurosurgery [facility])

## 2023-10-19 NOTE — PT/OT/SLP PROGRESS
Speech Language Pathology Treatment    Patient Name:  Kobi Pittman   MRN:  [id number]  910/910 A  Admitting Diagnosis: Nontraumatic cortical hemorrhage of left cerebral hemisphere    Recommendations:                General Recommendations:  Dysphagia therapy, Speech/language therapy, and Cognitive-linguistic therapy  Diet recommendations:  Regular Diet - IDDSI Level 7, Thin liquids - IDDSI Level 0   Aspiration Precautions: 1 bite/sip at a time, alternate bites and sips, Feed only when awake/alert, HOB to 90 degrees, Small bites/sips, and Standard aspiration precautions   General Precautions: Standard, vision impaired, fall  Communication strategies:  provide increased time to answer and go to room if call light pushed    Assessment:     Kobi Pittman is a 73 y.o. male with an SLP diagnosis of Cognitive-Linguistic Impairment. SLP will continue to follow.    Subjective     Pt found resting in bed upon SLP entry into room.     Patient goals: none stated    Objective:     Has the patient been evaluated by SLP for swallowing?   Yes  Keep patient NPO? No   Current Respiratory Status:   nasal cannula    Pt seen for ongoing cognitive linguistic therapy with emphasis on assessment of reading, writing, and visuospatial tasks. Pt able to track clinician bilaterally though required MOD-MAX verbal cues to locate SLP on right side of room vs MIN verbal cueing to locate SLP on left side. Spouse noted pt with baseline and new vision changes though no history of neglect/inattention. Provided list of 5 sentences (each on their own individual line), pt immediately attempting to read 3rd sentence despite SLP referencing to 1st sentence; he required MOD verbal and visual cueing to identify target sentence. He began reading sentence though unable to finish; attempted to cue for initial word though pt unable to state. Task complexity decreased to single letters though pt again with poor ability to locate words on paper, reading letters only  in the middle of the page, and unable to expressively or receptive identify letters Q and G. Attempted to engage pt in clock drawing task though pt unable to  pen with dominant right hand and reaching out with left hand to grab SLP's wrist. SLP provided education regarding overall impressions, continued assessment of reading/visuospatial tasks in the future, and ongoing SLP POC. Pt and spouse verbalized understanding but would continue to benefit from ongoing education.     Goals:   Multidisciplinary Problems       SLP Goals          Problem: SLP    Goal Priority Disciplines Outcome   SLP Goal     SLP Ongoing, Progressing   Description: Speech Language Pathology Goals  Goals expected to be met by 10/29    1. Pt will tolerate a regular sold and thin liquids diet without any overt s/sx of airway compromise.   2. Pt will participate in ongoing assessment of reading, writing and visuospatial skills to determine additional therapeutic needs.   3. Pt will independently orient x4.   4. Pt will independently answer multiunit/complex Y/N questions with 80% acc.  5. Pt will independently  follow 2-step directions with 80% acc.  6. Pt will answer simple problem solving tasks (compare/contrast/ sequence, math/time etc) with 80% acc following min assist.  7. Pt will independently complete a variety of word finding tasks with 80% acc.                             Plan:     Patient to be seen:  4 x/week   Plan of Care expires:  11/14/23  Plan of Care reviewed with:  patient, spouse, daughter   SLP Follow-Up:  Yes       Discharge recommendations:  High Intensity Therapy   Barriers to Discharge:  None    Time Tracking:     SLP Treatment Date:   10/19/23  Speech Start Time:  1121  Speech Stop Time:  1138     Speech Total Time (min):  17 min    Billable Minutes: Treatment Swallowing Dysfunction 9 and Self Care/Home Management Training 8      10/19/2023

## 2023-10-19 NOTE — ASSESSMENT & PLAN NOTE
Evidence of cerebral amyloid angiopathy seen on MRI. Likely contributing factor of stroke. Patient will likely not be a candidate for anticoagulation and will be referred to EP for possible watchman placement given A fib and likely cardioembolic etiology of his stroke.     Plan:  -Refer to outpatient EP upon discharge for possible watchman placement

## 2023-10-19 NOTE — PLAN OF CARE
10/19/23 1042   Post-Acute Status   Post-Acute Authorization Placement   Post-Acute Placement Status Set-up Complete/Auth obtained   Coverage Medicare   Discharge Plan   Discharge Plan A Rehab     Patient has been accepted at [facility].  They will take the patient tomorrow morning.  Covid pending and MICHAEL will notify patient and family.  SW will have MD put in orders today and set up transport for early tomorrow morning.        2:20 PM  Spoke with family at bedside.  Orders sent to [facility] and transport set up for 8am tomorrow.      Carol Zaman LMSW  [facility]  [phone]

## 2023-10-19 NOTE — PLAN OF CARE
Problem: Adult Inpatient Plan of Care  Goal: Plan of Care Review  Outcome: Ongoing, Progressing     Problem: Skin Injury Risk Increased  Goal: Skin Health and Integrity  Outcome: Ongoing, Progressing     Problem: Adjustment to Illness (Stroke, Hemorrhagic)  Goal: Optimal Coping  Outcome: Ongoing, Progressing     Problem: Fall Injury Risk  Goal: Absence of Fall and Fall-Related Injury  Outcome: Ongoing, Progressing

## 2023-10-19 NOTE — SUBJECTIVE & OBJECTIVE
Neurologic Chief Complaint: L parieto-occipital ICH    Subjective:     Interval History: Patient is seen for follow-up neurological assessment and treatment recommendations:     Neuro exam stable. Patient still with confusion and aphasia but with no other complaints.     HPI, Past Medical, Family, and Social History remains the same as documented in the initial encounter.     Review of Systems   Constitutional:  Negative for fever.   Eyes:  Positive for visual disturbance.   Respiratory:  Negative for cough.    Neurological:  Positive for facial asymmetry and speech difficulty. Negative for weakness and numbness.     Scheduled Meds:   amLODIPine  10 mg Oral Daily    heparin (porcine)  5,000 Units Subcutaneous Q8H    hydrALAZINE  50 mg Oral Q8H    losartan  100 mg Oral Daily    memantine  5 mg Oral BID    polyethylene glycol  17 g Oral BID    QUEtiapine  12.5 mg Oral QHS    senna-docusate 8.6-50 mg  2 tablet Oral BID    silodosin  4 mg Oral Daily     Continuous Infusions:      PRN Meds:acetaminophen, dextrose 10%, dextrose 10%, methocarbamoL, ondansetron, oxyCODONE    Objective:     Vital Signs (Most Recent):  Temp: 98.5 °F (36.9 °C) (10/19/23 1244)  Pulse: 66 (10/19/23 1435)  Resp: 18 (10/19/23 1244)  BP: 134/72 (10/19/23 1244)  SpO2: 95 % (10/19/23 1244)  BP Location: Right arm    Vital Signs Range (Last 24H):  Temp:  [97.8 °F (36.6 °C)-98.6 °F (37 °C)]   Pulse:  [60-87]   Resp:  [15-38]   BP: (111-167)/(61-87)   SpO2:  [93 %-97 %]   BP Location: Right arm       Physical Exam  Vitals reviewed.   Constitutional:       General: He is not in acute distress.  HENT:      Head: Normocephalic and atraumatic.   Eyes:      General: Visual field deficit present.   Cardiovascular:      Rate and Rhythm: Normal rate.   Pulmonary:      Effort: Pulmonary effort is normal. No respiratory distress.   Musculoskeletal:      Cervical back: No rigidity.   Skin:     General: Skin is warm and dry.   Neurological:      Mental Status: He  is alert.      Cranial Nerves: Facial asymmetry present.         Neurological Exam:   LOC: alert  Attention Span: Good   Language: Expressive aphasia  Articulation: No dysarthria  Orientation: Not oriented to place, Not oriented to time  Visual Fields: Hemianopsia right  EOM (CN III, IV, VI): Gaze preference  left  Facial Movement (CN VII): Lower facial weakness on the Right  Motor: Arm left  Normal 5/5  Leg left  Normal 5/5  Arm right  Normal 5/5  Leg right Normal 5/5  Sensation: Intact to light touch, temperature and vibration    Laboratory:  CMP:   Recent Labs   Lab 10/19/23  0302   CALCIUM 9.0   ALBUMIN 3.0*   PROT 6.5   *   K 3.6   CO2 23   CL 97   BUN 21   CREATININE 0.8   ALKPHOS 101   ALT 47*   AST 45*   BILITOT 0.7       CBC:   Recent Labs   Lab 10/19/23  0302   WBC 9.39   RBC 4.40*   HGB 13.9*   HCT 41.2      MCV 94   MCH 31.6*   MCHC 33.7       Lipid Panel:   Recent Labs   Lab 10/14/23  0404   CHOL 147   LDLCALC 87.8   HDL 38*   TRIG 106       Coagulation:   Recent Labs   Lab 10/14/23  0404   INR 1.0   APTT 22.8       Hgb A1C:   Recent Labs   Lab 10/14/23  0404   HGBA1C 5.7*       TSH:   Recent Labs   Lab 10/14/23  0404   TSH 2.782         Diagnostic Results     Brain Imaging   CTH without contrast 10/15/2023  Impression:  Stable intracranial hemorrhage (intraparenchymal intraventricular and subdural) and stable mild midline shift.  No hydrocephalus.     MRI Brain W WO contrast 10/14/2023  Impression:  Allowing for a difference in technique, similar appearance of left temporal occipital intraparenchymal hemorrhage, left subdural hemorrhage, and intraventricular extension.  No overt hydrocephalus.   No underlying enhancing intracranial lesion.  The venous sinuses appear patent.  numerous small foci of chronic hemosiderin staining are identified within the parenchyma perhaps representing a combination of amyloid angiopathy and hypertensive chronic microhemorrhages.     CTH without contrast  10/13/2023  Impression:  1. Approximate 7 x 5 cm parenchymal hematoma centered within the left occipital lobe moderate local mass effect and extension into the occipital horn left lateral ventricle.  No hydrocephalus.  2. Left-sided subdural hematoma measuring 4 mm in thickness with mild local mass effect and 4 mm left to right midline shift.      Vessel Imaging   CTA head and neck 10/14/2023  Impression:  Redemonstration of a left parieto-occipital intra-axial hematoma, with new intraventricular extension.   Not fully detailed above, there also remains an approximately 4 mm thick subdural hematoma centered over the lateral aspect of the left cerebral convexity.   There is approximately 3 mm rightward midline shift, similar to prior.   CTA head and neck shows no evidence of high-grade stenosis, aneurysm, or large vessel occlusion in the cerebrovascular arterial circulation.   The balloon cuff of the endotracheal tube appears to distend the tracheal lumen.  Correlate clinically, consider adjustment.       Cardiac Imaging   TTE 10/14/2023    Left Ventricle: The left ventricle is normal in size. Ventricular mass is normal. Normal wall thickness. Normal wall motion. There is normal systolic function. Ejection fraction by visual approximation is 65%. There is normal diastolic function.    Left Atrium: Left atrium is mildly dilated.    Right Ventricle: Normal right ventricular cavity size. Wall thickness is normal. Right ventricle wall motion  is normal. Systolic function is normal.    Aorta: Aortic root is mildly dilated measuring 4.44 cm. Ascending aorta is mildly dilated measuring 3.8 cm.    Pulmonary Artery: The estimated pulmonary artery systolic pressure is 28 mmHg.    IVC/SVC: Normal venous pressure at 3 mmHg.

## 2023-10-19 NOTE — PLAN OF CARE
[facility]    REHAB FACILITY TRANSFER ORDERS      Patient Name: Kobi Pittman  YOB: 1950    PCP: Jabari Hollis MD   PCP Address: [address] [place] [address] [place] [id number]  PCP Phone Number: [phone]  PCP Fax: [phone]    Encounter Date: 10/19/2023    Admit to: Rehab    Vital Signs:  Routine    Diagnoses:   Active Hospital Problems    Diagnosis  POA    *Nontraumatic cortical hemorrhage of left cerebral hemisphere [I61.1]  Yes    Paroxysmal atrial fibrillation [I48.0]  Unknown    Vascular dementia [F01.50]  Yes     Chronic    Vasogenic brain edema [G93.6]  Yes    Midline shift of brain [R90.89]  Yes    Endotracheally intubated [Z97.8]  Yes      Resolved Hospital Problems   No resolved problems to display.       Allergies:Review of patient's allergies indicates:  No Known Allergies    Diet: regular diet    Activities: Activity as tolerated and Up with assistance    Goals of Care Treatment Preferences:  Code Status: Full Code      Nursing:      Labs: CBC and BMP Once     CONSULTS:    Physical Therapy to evaluate and treat. , Occupational Therapy to evaluate and treat., and Speech Therapy to evaluate and treat for Swallowing.    MISCELLANEOUS CARE:      WOUND CARE ORDERS  None    Medications: Review discharge medications with patient and family and provide education.      Current Discharge Medication List        START taking these medications    Details   amLODIPine (NORVASC) 10 MG tablet Take 1 tablet (10 mg total) by mouth once daily.  Qty: 30 tablet, Refills: 2    Comments: .      hydrALAZINE (APRESOLINE) 50 MG tablet Take 1 tablet (50 mg total) by mouth every 8 (eight) hours.  Qty: 90 tablet, Refills: 2    Comments: .      losartan (COZAAR) 100 MG tablet Take 1 tablet (100 mg total) by mouth once daily.  Qty: 90 tablet, Refills: 0    Comments: .      QUEtiapine (SEROQUEL) 25 MG Tab Take 1 tablet (25 mg total) by mouth nightly as needed (For agitation).  Qty: 30 tablet, Refills: 11       silodosin (RAPAFLO) 4 mg Cap capsule Take 1 capsule (4 mg total) by mouth once daily.  Qty: 30 capsule, Refills: 2           CONTINUE these medications which have NOT CHANGED    Details   hydrocodone-acetaminophen 7.5-325mg (NORCO) 7.5-325 mg per tablet Take 1-2 tablets by mouth every 4 (four) hours as needed for Pain.       memantine (NAMENDA) 10 MG Tab Take 5 mg by mouth 2 (two) times daily.      MULTIVITAMIN/IRON/FOLIC ACID (CENTRUM COMPLETE ORAL) Take 1 tablet by mouth once daily.                Immunizations Administered as of 10/19/2023       Name Date Dose VIS Date Route Exp Date    COVID-19, MRNA, LN-S, PF (Pfizer) (Purple Cap) 9/16/2021 11:50 AM 0.3 mL 12/12/2020 Intramuscular 10/31/2021    Site: Left deltoid     Given By: Carlie Swann, RN     : [employer]     Lot: [id number]     COVID-19, MRNA, LN-S, PF (Pfizer) (Purple Cap) 8/10/2021  9:46 AM 0.3 mL 12/12/2020 Intramuscular 10/31/2021    Site: Left deltoid     Given By: Rianna Mcdaniel     : [employer]     Lot: [id number]             This patient has had both covid vaccinations    Some patients may experience side effects after vaccination.  These may include fever, headache, muscle or joint aches.  Most symptoms resolve with 24-48 hours and do not require urgent medical evaluation unless they persist for more than 72 hours or symptoms are concerning for an unrelated medical condition.          _________________________________  Amilcar Eldridge DO  10/19/2023

## 2023-10-19 NOTE — PT/OT/SLP PROGRESS
Physical Therapy Treatment    Patient Name: Kobi Pittman   MRN: [id number]    Recommendations:     Discharge Recommendations:    Discharge Equipment Recommendations: bedside commode, bath bench  Barriers to discharge: Increased level of assist and Decreased caregiver support at current functional level    Assessment:     Kobi Pittman is a 73 y.o. male admitted with a medical diagnosis of Nontraumatic cortical hemorrhage of left cerebral hemisphere. He presents with the following impairments/functional limitations: weakness, impaired endurance, impaired self care skills, impaired functional mobility, gait instability, impaired balance, visual deficits, impaired cognition, decreased lower extremity function, decreased safety awareness, impaired coordination, impaired fine motor, impaired cardiopulmonary response to activity. Pt with good tolerance to therapy session on this date. Pt with intermittent confusion throughout treatment, but was redirectable once oriented. Pt demonstrating increased difficulty with short term memory today. Pt requiring limited assist with functional mobility and ambulation; however, pt at increased risk of falls 2/2 impaired dynamic postural stability, visual impairments, and AMS. Pt to benefit from high intensity therapy following discharge once medically stable in order to reduce caregiver burden, reduce fall risk, improve quality of life, and improve functional independence. Pt would continue to benefit from skilled acute PT in order to address current deficits and progress functional mobility.     Rehab Prognosis: Good; patient continues to benefit from acute skilled PT services to address these deficits and reach maximum level of function.  Recent Surgery: * No surgery found *      Plan:     During this hospitalization, patient to be seen 4 x/week to address the identified rehab impairments via gait training, therapeutic activities, therapeutic exercises, neuromuscular re-education and  "progress toward the following goals:    Plan of Care Expires:  11/16/23    Subjective     Chief Complaint: None verbalized  Patient/Family Comments/Goals: "My vision is blurry and sometimes I see double."  Pain/Comfort:  Pain Rating 1: 0/10    Objective:     Communicated with RN prior to session. Patient found HOB elevated with bed alarm, telemetry, oxygen upon PT entry to room.     General Precautions: Standard, fall, vision impaired  Orthopedic Precautions: N/A  Braces: N/A    Functional Mobility:  Bed Mobility:    Rolling Right: stand by assistance  Supine to Sit: stand by assistance  Sit to Supine: stand by assistance  Transfers:    Sit to Stand: contact guard assistance with no AD with cues for hand placement and foot placement  Gait: Patient ambulated 72' x 2 with standing rest break between trials with hand-held assist and  CGA-min . Pt requiring minimal assistance with head turns during ambulation and when distracted from task.  Patient demonstrates unsteady gait, increased lateral sway, increased kika, decreased step length, wide base of support, decreased foot clearance, flexed posture, decreased arm swing, inconsistent bilateral foot placement, and shuffle gait.   Patient required cues for upright posture, gluteal activation, sequencing, obstacle navigation, decreased kika, increased step size, foot placement, and increased foot clearance.  Focus on dual task ambulation this day to encourage increased visual scanning, navigation, and number sequencing by having patient find hospital room on nursing unit.   All lines remained intact throughout ambulation trial, gait belt utilized.    AM-PAC 6 CLICK MOBILITY  Turning over in bed (including adjusting bedclothes, sheets and blankets)?: 3  Sitting down on and standing up from a chair with arms (e.g., wheelchair, bedside commode, etc.): 3  Moving from lying on back to sitting on the side of the bed?: 3  Moving to and from a bed to a chair (including a " wheelchair)?: 3  Need to walk in hospital room?: 3  Climbing 3-5 steps with a railing?: 2  Basic Mobility Total Score: 17     Therapeutic Activities and Exercises:  Patient educated on role of acute care PT and PT POC, safety while in hospital including calling nurse for mobility, and call light usage  Pt educated on the effects of bed rest and the importance of OOB activity. Pt encouraged to sit UIC majority of day as tolerated and continue daily ambulation with nursing assist. Pt verbalized understanding.  Answered all questions within PT scope of practice and addressed functional mobility concerns.  PT oriented pt to place, time, and situation.   Extended time spent in room 2/2 MD coming to bedside after session began.    Patient left HOB elevated with all lines intact, call button in reach, RN notified, bed alarm on, and spouse present.    GOALS:   Multidisciplinary Problems       Physical Therapy Goals          Problem: Physical Therapy    Goal Priority Disciplines Outcome Goal Variances Interventions   Physical Therapy Goal     PT, PT/OT Ongoing, Progressing     Description: Goals to be met by: 2023     Patient will increase functional independence with mobility by performin. Supine to sit with Stand-by Assistance - met 10/19/2023  Updated: modified independence  2. Sit to supine with Stand-by Assistance - met 10/19/2023  Updated: modified independence  3. Sit to stand transfer with Stand-by Assistance  4. Bed to chair transfer with Stand-by Assistance using LRAD  5. Gait  x 100 feet with Contact Guard Assistance using LRAD.   6. Lower extremity exercise program x15 reps per handout, with independence                         Time Tracking:     PT Received On: 10/19/23  PT Start Time: 852     PT Stop Time: 911  PT Total Time (min): 19 min     Billable Minutes: Gait Training 10      Treatment Type: Treatment  PT/PTA: PT     Number of PTA visits since last PT visit: 0     10/19/2023

## 2023-10-20 VITALS
DIASTOLIC BLOOD PRESSURE: 71 MMHG | RESPIRATION RATE: 16 BRPM | TEMPERATURE: 99 F | HEART RATE: 83 BPM | SYSTOLIC BLOOD PRESSURE: 132 MMHG | WEIGHT: 141 LBS | BODY MASS INDEX: 23.49 KG/M2 | OXYGEN SATURATION: 96 % | HEIGHT: 65 IN

## 2023-10-20 PROBLEM — E63.9 INADEQUATE DIETARY ENERGY INTAKE: Status: ACTIVE | Noted: 2023-10-20

## 2023-10-20 LAB
ALBUMIN SERPL BCP-MCNC: 3.1 G/DL (ref 3.5–5.2)
ALP SERPL-CCNC: 115 U/L (ref 55–135)
ALT SERPL W/O P-5'-P-CCNC: 50 U/L (ref 10–44)
ANION GAP SERPL CALC-SCNC: 12 MMOL/L (ref 8–16)
AST SERPL-CCNC: 50 U/L (ref 10–40)
BASOPHILS # BLD AUTO: 0.05 K/UL (ref 0–0.2)
BASOPHILS NFR BLD: 0.5 % (ref 0–1.9)
BILIRUB SERPL-MCNC: 0.8 MG/DL (ref 0.1–1)
BUN SERPL-MCNC: 23 MG/DL (ref 8–23)
CALCIUM SERPL-MCNC: 9 MG/DL (ref 8.7–10.5)
CHLORIDE SERPL-SCNC: 96 MMOL/L (ref 95–110)
CO2 SERPL-SCNC: 22 MMOL/L (ref 23–29)
CREAT SERPL-MCNC: 0.8 MG/DL (ref 0.5–1.4)
DIFFERENTIAL METHOD: ABNORMAL
EOSINOPHIL # BLD AUTO: 0.2 K/UL (ref 0–0.5)
EOSINOPHIL NFR BLD: 2.4 % (ref 0–8)
ERYTHROCYTE [DISTWIDTH] IN BLOOD BY AUTOMATED COUNT: 12.5 % (ref 11.5–14.5)
EST. GFR  (NO RACE VARIABLE): >60 ML/MIN/1.73 M^2
GLUCOSE SERPL-MCNC: 106 MG/DL (ref 70–110)
HCT VFR BLD AUTO: 43.7 % (ref 40–54)
HGB BLD-MCNC: 15 G/DL (ref 14–18)
IMM GRANULOCYTES # BLD AUTO: 0.08 K/UL (ref 0–0.04)
IMM GRANULOCYTES NFR BLD AUTO: 0.9 % (ref 0–0.5)
LYMPHOCYTES # BLD AUTO: 1.9 K/UL (ref 1–4.8)
LYMPHOCYTES NFR BLD: 20.9 % (ref 18–48)
MAGNESIUM SERPL-MCNC: 2 MG/DL (ref 1.6–2.6)
MCH RBC QN AUTO: 31.4 PG (ref 27–31)
MCHC RBC AUTO-ENTMCNC: 34.3 G/DL (ref 32–36)
MCV RBC AUTO: 92 FL (ref 82–98)
MONOCYTES # BLD AUTO: 0.8 K/UL (ref 0.3–1)
MONOCYTES NFR BLD: 8.8 % (ref 4–15)
NEUTROPHILS # BLD AUTO: 6.1 K/UL (ref 1.8–7.7)
NEUTROPHILS NFR BLD: 66.5 % (ref 38–73)
NRBC BLD-RTO: 0 /100 WBC
PHOSPHATE SERPL-MCNC: 3.5 MG/DL (ref 2.7–4.5)
PLATELET # BLD AUTO: 270 K/UL (ref 150–450)
PMV BLD AUTO: 10.5 FL (ref 9.2–12.9)
POTASSIUM SERPL-SCNC: 3.7 MMOL/L (ref 3.5–5.1)
PROT SERPL-MCNC: 6.7 G/DL (ref 6–8.4)
RBC # BLD AUTO: 4.77 M/UL (ref 4.6–6.2)
SODIUM SERPL-SCNC: 130 MMOL/L (ref 136–145)
WBC # BLD AUTO: 9.11 K/UL (ref 3.9–12.7)

## 2023-10-20 PROCEDURE — 80053 COMPREHEN METABOLIC PANEL: CPT

## 2023-10-20 PROCEDURE — 63600175 PHARM REV CODE 636 W HCPCS

## 2023-10-20 PROCEDURE — 99238 PR HOSPITAL DISCHARGE DAY,<30 MIN: ICD-10-PCS | Mod: GC,,, | Performed by: PSYCHIATRY & NEUROLOGY

## 2023-10-20 PROCEDURE — 84100 ASSAY OF PHOSPHORUS: CPT

## 2023-10-20 PROCEDURE — 93010 EKG 12-LEAD: ICD-10-PCS | Mod: ,,, | Performed by: INTERNAL MEDICINE

## 2023-10-20 PROCEDURE — 85025 COMPLETE CBC W/AUTO DIFF WBC: CPT

## 2023-10-20 PROCEDURE — 36415 COLL VENOUS BLD VENIPUNCTURE: CPT

## 2023-10-20 PROCEDURE — 83735 ASSAY OF MAGNESIUM: CPT

## 2023-10-20 PROCEDURE — 93010 ELECTROCARDIOGRAM REPORT: CPT | Mod: ,,, | Performed by: INTERNAL MEDICINE

## 2023-10-20 PROCEDURE — 25000003 PHARM REV CODE 250

## 2023-10-20 PROCEDURE — 99238 HOSP IP/OBS DSCHRG MGMT 30/<: CPT | Mod: GC,,, | Performed by: PSYCHIATRY & NEUROLOGY

## 2023-10-20 PROCEDURE — 93005 ELECTROCARDIOGRAM TRACING: CPT

## 2023-10-20 RX ADMIN — HYDRALAZINE HYDROCHLORIDE 50 MG: 50 TABLET ORAL at 05:10

## 2023-10-20 RX ADMIN — HEPARIN SODIUM 5000 UNITS: 5000 INJECTION INTRAVENOUS; SUBCUTANEOUS at 05:10

## 2023-10-20 NOTE — PLAN OF CARE
[facility]  Discharge Final Note    Primary Care Provider: Jabari Hollis MD    Expected Discharge Date: 10/20/2023    Final Discharge Note (most recent)       Final Note - 10/20/23 0756          Final Note    Assessment Type Final Discharge Note (P)      Anticipated Discharge Disposition Rehab Facility (P)         Post-Acute Status    Post-Acute Authorization Placement (P)      Post-Acute Placement Status Set-up Complete/Auth obtained (P)      Coverage Medicare (P)                      Important Message from Medicare:  N/A         No future appointments.    [facility]  SW provided RN with number for report and ambo transport set up for 8am.  Family at bedside and notified.      Carol Zaman LMSW  [facility]  [phone]

## 2023-10-20 NOTE — PROGRESS NOTES
Pt discharged to Rehab via ambulance transportation at this time with all documented belongings. AVS provided to family and reviewed. Opportunities for questions provided. Pts family verbalized understanding. PIVs removed, sites WNL.

## 2023-10-20 NOTE — DISCHARGE SUMMARY
[facility] ([facility])  Vascular Neurology  Comprehensive Stroke Center  Discharge Summary     Summary:     Admit Date: 10/14/2023 12:51 AM    Discharge Date and Time:  10/20/2023 10:29 AM    Attending Physician: No att. providers found     Discharge Provider: Amilcar Eldridge DO    History of Present Illness: 73-year-old male coming to the ER at [facility] with a chief complaint of confusion and difficulty seeing.  The patient's family member stated that he was in his usual state of health this morning he left the house around 3:00 a.m. on 10-13-23 to go cut some grass when he returned around 5 the patient's wife noted that he was having difficulty turning off his truck he was having difficulty seeing you could not see any pictures on the wall he had no numbness complains of some generalized weakness and a headache on the left side it has been off and on for about a week.  He denies any trauma    Patient was intubated prior to transfer as decrease in GCS and patient vomited. He was placed on Cardene drip and sedation.    Patient was direct transfer to NICU.    Received call about consult at [phone].      Hospital Course (synopsis of major diagnoses, care, treatment, and services provided during the course of the hospital stay): 10/16/2023 NAEO, neuro exam stable although has waxing/waning. Lethargic this morning during exam, although arouses with repeated stimuli. Cardene gtt weaned off today, however required PRN hydralazine a few hours later. Started on losartan for more consistent BP control, anticipate likely step down tomorrow once BP more consistently at goal.  10/17/2023 patient had episode of afib RVR last night which was confirmed on EKG. Patient converted back to NSR, metoprolol not given. Off cardene, requiring prn hydralazine/labetalol, dispo rehab    Patient with left parietooccipital hemorrhage and left subdural hematoma who orginally presented to [facility]  Center with confusion and blurry/double vision stepped down to NPU. CTA revealed no LVO. MRI revealed temporal/occipital hemorrhagic conversion within an MCA distribution infarct. Patient had episode of A fib that resolved without intervention and echo remarkable for mild left atrial enlargement. Etiology thought to be cardioembolic but cerebral amyloid angiopathy seen on MRI. He is not a candidate for anticoagulation right now given evidence of cerebral amyloid angiopathy even though he has a history of atrial fibrillation. Patient will follow up with vascular neurology in 4-6 weeks who can make a definitive decision on anticoagulation.      Goals of Care Treatment Preferences:  Code Status: Full Code      Stroke Etiology: Hemorrhage ICH Lobar Amyloid Angiopathy    STROKE DOCUMENTATION         NIH Scale:  1a. Level of Consciousness: 0-->Alert, keenly responsive  1b. LOC Questions: 2-->Answers neither question correctly  1c. LOC Commands: 0-->Performs both tasks correctly  2. Best Gaze: 0-->Normal  3. Visual: 0-->No visual loss  4. Facial Palsy: 0-->Normal symmetrical movements  5a. Motor Arm, Left: 0-->No drift, limb holds 90 (or 45) degrees for full 10 secs  5b. Motor Arm, Right: 0-->No drift, limb holds 90 (or 45) degrees for full 10 secs  6a. Motor Leg, Left: 0-->No drift, leg holds 30 degree position for full 5 secs  6b. Motor Leg, Right: 0-->No drift, leg holds 30 degree position for full 5 secs  7. Limb Ataxia: 0-->Absent  8. Sensory: 0-->Normal, no sensory loss  9. Best Language: 1-->Mild-to-moderate aphasia, some obvious loss of fluency or facility of comprehension, without significant limitation on ideas expressed or form of expression. Reduction of speech and/or comprehension, however, makes conversation. . . (see row details)  10. Dysarthria: 0-->Normal  11. Extinction and Inattention (formerly Neglect): 0-->No abnormality  Total (NIH Stroke Scale): 3        Modified Rachel Score: 3  Malick Coma Scale:     ABCD2 Score:    [id number] Score:   HAS -BLED Score:   ICH Score:2  Hunt & Reese Classification:       Assessment/Plan:     Diagnostic Results:      Brain Imaging   CTH without contrast 10/15/2023  Impression:  Stable intracranial hemorrhage (intraparenchymal intraventricular and subdural) and stable mild midline shift.  No hydrocephalus.      MRI Brain W WO contrast 10/14/2023  Impression:  Allowing for a difference in technique, similar appearance of left temporal occipital intraparenchymal hemorrhage, left subdural hemorrhage, and intraventricular extension.  No overt hydrocephalus.   No underlying enhancing intracranial lesion.  The venous sinuses appear patent.  numerous small foci of chronic hemosiderin staining are identified within the parenchyma perhaps representing a combination of amyloid angiopathy and hypertensive chronic microhemorrhages.      CTH without contrast 10/13/2023  Impression:  1. Approximate 7 x 5 cm parenchymal hematoma centered within the left occipital lobe moderate local mass effect and extension into the occipital horn left lateral ventricle.  No hydrocephalus.  2. Left-sided subdural hematoma measuring 4 mm in thickness with mild local mass effect and 4 mm left to right midline shift.        Vessel Imaging   CTA head and neck 10/14/2023  Impression:  Redemonstration of a left parieto-occipital intra-axial hematoma, with new intraventricular extension.   Not fully detailed above, there also remains an approximately 4 mm thick subdural hematoma centered over the lateral aspect of the left cerebral convexity.   There is approximately 3 mm rightward midline shift, similar to prior.   CTA head and neck shows no evidence of high-grade stenosis, aneurysm, or large vessel occlusion in the cerebrovascular arterial circulation.   The balloon cuff of the endotracheal tube appears to distend the tracheal lumen.  Correlate clinically, consider adjustment.         Cardiac Imaging   TTE  10/14/2023    Left Ventricle: The left ventricle is normal in size. Ventricular mass is normal. Normal wall thickness. Normal wall motion. There is normal systolic function. Ejection fraction by visual approximation is 65%. There is normal diastolic function.    Left Atrium: Left atrium is mildly dilated.    Right Ventricle: Normal right ventricular cavity size. Wall thickness is normal. Right ventricle wall motion  is normal. Systolic function is normal.    Aorta: Aortic root is mildly dilated measuring 4.44 cm. Ascending aorta is mildly dilated measuring 3.8 cm.    Pulmonary Artery: The estimated pulmonary artery systolic pressure is 28 mmHg.    IVC/SVC: Normal venous pressure at 3 mmHg.       Interventions: None    Complications: None    Disposition: Rehab Facility    Final Active Diagnoses:    Diagnosis Date Noted POA    PRINCIPAL PROBLEM:  Nontraumatic cortical hemorrhage of left cerebral hemisphere [I61.1] 10/14/2023 Yes    Cerebral amyloid angiopathy [E85.4, I68.0] 10/19/2023 Yes    Paroxysmal atrial fibrillation [I48.0] 10/17/2023 Yes    Vascular dementia [F01.50] 10/14/2023 Yes     Chronic    Vasogenic brain edema [G93.6] 10/14/2023 Yes    Midline shift of brain [R90.89] 10/14/2023 Yes    Endotracheally intubated [Z97.8] 10/14/2023 Yes      Problems Resolved During this Admission:     No new Assessment & Plan notes have been filed under this hospital service since the last note was generated.  Service: Vascular Neurology      Recommendations:     Post-discharge complication risks: None    Stroke Education given to: patient and family    Follow-up in Stroke Clinic in 4-6 weeks.     Discharge Plan:  Aggresive risk factor modification:  Hypertension  Atrial Fibrillation    Follow Up:      Patient Instructions:   No discharge procedures on file.    Medications:  Reconciled Home Medications:      Medication List      START taking these medications    amLODIPine 10 MG tablet  Commonly known as:  NORVASC  Take 1 tablet (10 mg total) by mouth once daily.     hydrALAZINE 50 MG tablet  Commonly known as: APRESOLINE  Take 1 tablet (50 mg total) by mouth every 8 (eight) hours.     losartan 100 MG tablet  Commonly known as: COZAAR  Take 1 tablet (100 mg total) by mouth once daily.     QUEtiapine 25 MG Tab  Commonly known as: SEROQUEL  Take 1 tablet (25 mg total) by mouth nightly as needed (For agitation).     silodosin 4 mg Cap capsule  Commonly known as: RAPAFLO  Take 1 capsule (4 mg total) by mouth once daily.        CONTINUE taking these medications    CENTRUM COMPLETE ORAL  Take 1 tablet by mouth once daily.     HYDROcodone-acetaminophen 7.5-325 mg per tablet  Commonly known as: NORCO  Take 1-2 tablets by mouth every 4 (four) hours as needed for Pain.     memantine 10 MG Tab  Commonly known as: NAMENDA  Take 5 mg by mouth 2 (two) times daily.            Amilcar Eldridge DO  [facility]  Department of Vascular Neurology   [facility])

## 2023-10-26 PROBLEM — I10 PRIMARY HYPERTENSION: Status: ACTIVE | Noted: 2023-10-26

## 2023-10-31 PROBLEM — E63.9 INADEQUATE DIETARY ENERGY INTAKE: Status: RESOLVED | Noted: 2023-10-20 | Resolved: 2023-10-31

## 2024-02-12 ENCOUNTER — DOCUMENTATION ONLY (OUTPATIENT)
Dept: NEUROLOGY | Facility: HOSPITAL | Age: 74
End: 2024-02-12
Payer: MEDICARE

## 2024-04-11 PROBLEM — Z95.818 PRESENCE OF WATCHMAN LEFT ATRIAL APPENDAGE CLOSURE DEVICE: Status: ACTIVE | Noted: 2024-04-11

## 2024-06-14 NOTE — PT/OT/SLP EVAL
Occupational Therapy  Co- Evaluation W/ PT     Name: Kobi Pittman  MRN: [id number]  Admitting Diagnosis: Nontraumatic cortical hemorrhage of left cerebral hemisphere  Recent Surgery: * No surgery found *      Recommendations:     Discharge Recommendations: High Intensity Therapy  Discharge Equipment Recommendations:  to be determined by next level of care  Barriers to discharge:       Assessment:     Kobi Pittman is a 73 y.o. male with a medical diagnosis of Nontraumatic cortical hemorrhage of left cerebral hemisphere.  He presents with decrease functional status secondary to medical diagnosis. Performance deficits affecting function: weakness, impaired functional mobility, impaired cognition, decreased safety awareness, impaired coordination, impaired endurance, gait instability, decreased coordination, impaired fine motor, decreased upper extremity function, impaired balance, impaired self care skills, decreased lower extremity function, decreased ROM.  Patient with difficulty following commands during evaluation due to cognitive limitations/pleasant confusion. Patient with good BUE functional status but needing increased time to complete motor movements. Patient with significant visual impairment noted unable to track to right; presenting with right sided field cut. Due to patient cognition, physical attributes, and visual deficits patient should continue with a high intensity therapy regimen upon d/c from facility. Patient will continue to participate in acute OT services while admitted to [facility] to increase functional status.     Rehab Prognosis: Good; patient would benefit from acute skilled OT services to address these deficits and reach maximum level of function.       Plan:     Patient to be seen 4 x/week to address the above listed problems via self-care/home management, neuromuscular re-education, therapeutic activities, therapeutic exercises  Plan of Care Expires: 11/16/23  Plan of Care Reviewed with:  This encounter was created for [facility] orders only .    patient, spouse    Subjective     Chief Complaint: Patient with nausea this date   Patient/Family Comments/goals: Increase functional status     Occupational Profile:  Living Environment: Patient lives [place] [facility] with sister and wife and no MARY. Patient has a tub shower with no DME.   Previous level of function: Independent   Roles and Routines: ,    Equipment Used at Home: none  Assistance upon Discharge: Wife, Family     Pain/Comfort:  Location 1: head    Patients cultural, spiritual, [employer]ism conflicts given the current situation: no    Objective:     Communicated with: RN prior to session.  Patient found supine with peripheral IV upon OT entry to room.    General Precautions: Standard,    Orthopedic Precautions: N/A  Braces: N/A  Respiratory Status: Room air    Occupational Performance:    Bed Mobility:    Patient completed Rolling/Turning to Left with standby assistance  Patient completed Scooting/Bridging with standby assistance  Patient completed Supine to Sit with minimum assistance  Patient completed Sit to Supine with minimum assistance for LE management     Functional Mobility/Transfers:  Patient completed Sit <> Stand Transfer with minimum assistance  with  no assistive device; patient needing cueing for upright posture and positioning.   Functional Mobility: Patient able to ambulate ~6 steps forward with HH/min assist. Patient needing cueing due to unsteady gate and difficulty weight shifting during ambulation.     Activities of Daily Living:  Grooming: minimum assistance sitting EOB with increased cueing due to cognitive decline   Upper Body Dressing: moderate assistance to don gown as robe   Lower Body Dressing: contact guard assistance with increased time     Cognitive/Visual Perceptual:  Cognitive/Psychosocial Skills:     -       Oriented to: Person and Place   -       Follows Commands/attention:Patient attempting to follow commands but demonstrating difficulty this date due to slight  confusion.   -       Communication: clear/fluent  -       Safety awareness/insight to disability: intact     Physical Exam:  Upper Extremity Range of Motion:     -       Right Upper Extremity: WFL  -       Left Upper Extremity: WFL  Upper Extremity Strength:    -       Right Upper Extremity: WFL  -       Left Upper Extremity: WFL   Strength:    -       Right Upper Extremity: WFL  -       Left Upper Extremity: WFL  Fine Motor Coordination:    -       Intact    AMPAC 6 Click ADL:  AMPAC Total Score: 16    Treatment & Education:  Co-evaluation completed due to patient medical instability and to ensure patient safety.  Provided education regarding role of OT, POC, & discharge recommendations.  Pt with no further questions/concerns at this time.  OT provided education on home recommendations and fall prevention in preparation for D/C.      Patient left supine with all lines intact, call button in reach, and RN present    GOALS:   Multidisciplinary Problems       Occupational Therapy Goals          Problem: Occupational Therapy    Goal Priority Disciplines Outcome Interventions   Occupational Therapy Goal     OT, PT/OT     Description: Goals to be met by: 11/16/23     Patient will increase functional independence with ADLs by performing:    UE Dressing with Minimal Assistance.  LE Dressing with Minimal Assistance.  Grooming while standing with [place].  Toileting from toilet with Minimal Assistance for hygiene and clothing management.   Bathing from  shower chair/bench with Minimal Assistance.                         History:     Past Medical History:   Diagnosis Date    Acid reflux     Cataract     Kidney stone          Past Surgical History:   Procedure Laterality Date    COLONOSCOPY         Time Tracking:     OT Date of Treatment: 10/16/23  OT Start Time: 1056  OT Stop Time: 1123  OT Total Time (min): 27 min    Billable Minutes:Evaluation 10 minutes   Neuromuscular Re-education 17 minutes     10/16/2023